# Patient Record
Sex: FEMALE | Race: WHITE | NOT HISPANIC OR LATINO | Employment: OTHER | URBAN - METROPOLITAN AREA
[De-identification: names, ages, dates, MRNs, and addresses within clinical notes are randomized per-mention and may not be internally consistent; named-entity substitution may affect disease eponyms.]

---

## 2020-07-10 ENCOUNTER — HOSPITAL ENCOUNTER (INPATIENT)
Facility: HOSPITAL | Age: 70
LOS: 4 days | Discharge: HOME | End: 2020-07-14
Attending: EMERGENCY MEDICINE | Admitting: INTERNAL MEDICINE

## 2020-07-10 ENCOUNTER — APPOINTMENT (OUTPATIENT)
Dept: CT IMAGING | Facility: HOSPITAL | Age: 70
End: 2020-07-10

## 2020-07-10 DIAGNOSIS — J18.9 PNEUMONIA OF RIGHT UPPER LOBE DUE TO INFECTIOUS ORGANISM: Primary | ICD-10-CM

## 2020-07-10 DIAGNOSIS — I50.9 ACUTE CONGESTIVE HEART FAILURE, UNSPECIFIED HEART FAILURE TYPE (HCC): ICD-10-CM

## 2020-07-10 DIAGNOSIS — I50.21 ACUTE SYSTOLIC HEART FAILURE (HCC): ICD-10-CM

## 2020-07-10 PROBLEM — I51.7 CARDIOMEGALY: Status: ACTIVE | Noted: 2020-07-10

## 2020-07-10 PROBLEM — D64.9 ANEMIA, UNSPECIFIED: Status: ACTIVE | Noted: 2020-07-10

## 2020-07-10 PROBLEM — I44.7 LBBB (LEFT BUNDLE BRANCH BLOCK): Status: ACTIVE | Noted: 2020-07-10

## 2020-07-10 PROBLEM — R79.89 ABNORMAL TSH: Status: ACTIVE | Noted: 2020-07-10

## 2020-07-10 PROBLEM — J96.01 ACUTE RESPIRATORY FAILURE WITH HYPOXIA (HCC): Status: ACTIVE | Noted: 2020-07-10

## 2020-07-10 LAB
ALBUMIN SERPL-MCNC: 4.6 G/DL (ref 3.5–5.2)
ALBUMIN/GLOB SERPL: 1.5 G/DL
ALP SERPL-CCNC: 127 U/L (ref 39–117)
ALT SERPL W P-5'-P-CCNC: 20 U/L (ref 1–33)
ANION GAP SERPL CALCULATED.3IONS-SCNC: 14 MMOL/L (ref 5–15)
AST SERPL-CCNC: 29 U/L (ref 1–32)
BASOPHILS # BLD AUTO: 0.05 10*3/MM3 (ref 0–0.2)
BASOPHILS NFR BLD AUTO: 0.5 % (ref 0–1.5)
BILIRUB SERPL-MCNC: 0.7 MG/DL (ref 0–1.2)
BUN SERPL-MCNC: 17 MG/DL (ref 8–23)
BUN/CREAT SERPL: 14.9 (ref 7–25)
CALCIUM SPEC-SCNC: 9.8 MG/DL (ref 8.6–10.5)
CHLORIDE SERPL-SCNC: 101 MMOL/L (ref 98–107)
CO2 SERPL-SCNC: 25 MMOL/L (ref 22–29)
CREAT SERPL-MCNC: 1.14 MG/DL (ref 0.57–1)
CRP SERPL-MCNC: 1.43 MG/DL (ref 0–0.5)
D DIMER PPP FEU-MCNC: 1.72 MCGFEU/ML (ref 0–0.56)
D-LACTATE SERPL-SCNC: 1.2 MMOL/L (ref 0.5–2)
DEPRECATED RDW RBC AUTO: 45.9 FL (ref 37–54)
EOSINOPHIL # BLD AUTO: 0.05 10*3/MM3 (ref 0–0.4)
EOSINOPHIL NFR BLD AUTO: 0.5 % (ref 0.3–6.2)
ERYTHROCYTE [DISTWIDTH] IN BLOOD BY AUTOMATED COUNT: 14.9 % (ref 12.3–15.4)
GFR SERPL CREATININE-BSD FRML MDRD: 48 ML/MIN/1.73
GLOBULIN UR ELPH-MCNC: 3.1 GM/DL
GLUCOSE SERPL-MCNC: 145 MG/DL (ref 65–99)
HCT VFR BLD AUTO: 34.8 % (ref 34–46.6)
HGB BLD-MCNC: 10.9 G/DL (ref 12–15.9)
HOLD SPECIMEN: NORMAL
HOLD SPECIMEN: NORMAL
IMM GRANULOCYTES # BLD AUTO: 0.04 10*3/MM3 (ref 0–0.05)
IMM GRANULOCYTES NFR BLD AUTO: 0.4 % (ref 0–0.5)
LDH SERPL-CCNC: 251 U/L (ref 135–214)
LYMPHOCYTES # BLD AUTO: 1.54 10*3/MM3 (ref 0.7–3.1)
LYMPHOCYTES NFR BLD AUTO: 15.1 % (ref 19.6–45.3)
MCH RBC QN AUTO: 26.8 PG (ref 26.6–33)
MCHC RBC AUTO-ENTMCNC: 31.3 G/DL (ref 31.5–35.7)
MCV RBC AUTO: 85.5 FL (ref 79–97)
MONOCYTES # BLD AUTO: 0.79 10*3/MM3 (ref 0.1–0.9)
MONOCYTES NFR BLD AUTO: 7.7 % (ref 5–12)
NEUTROPHILS NFR BLD AUTO: 7.73 10*3/MM3 (ref 1.7–7)
NEUTROPHILS NFR BLD AUTO: 75.8 % (ref 42.7–76)
NRBC BLD AUTO-RTO: 0 /100 WBC (ref 0–0.2)
NT-PROBNP SERPL-MCNC: ABNORMAL PG/ML (ref 0–900)
PLATELET # BLD AUTO: 461 10*3/MM3 (ref 140–450)
PMV BLD AUTO: 10.4 FL (ref 6–12)
POTASSIUM SERPL-SCNC: 3.8 MMOL/L (ref 3.5–5.2)
PROCALCITONIN SERPL-MCNC: 0.1 NG/ML (ref 0–0.25)
PROT SERPL-MCNC: 7.7 G/DL (ref 6–8.5)
RBC # BLD AUTO: 4.07 10*6/MM3 (ref 3.77–5.28)
SODIUM SERPL-SCNC: 140 MMOL/L (ref 136–145)
TROPONIN T SERPL-MCNC: <0.01 NG/ML (ref 0–0.03)
TSH SERPL DL<=0.05 MIU/L-ACNC: 7.73 UIU/ML (ref 0.27–4.2)
WBC # BLD AUTO: 10.2 10*3/MM3 (ref 3.4–10.8)
WHOLE BLOOD HOLD SPECIMEN: NORMAL
WHOLE BLOOD HOLD SPECIMEN: NORMAL

## 2020-07-10 PROCEDURE — 80053 COMPREHEN METABOLIC PANEL: CPT | Performed by: EMERGENCY MEDICINE

## 2020-07-10 PROCEDURE — 85379 FIBRIN DEGRADATION QUANT: CPT | Performed by: INTERNAL MEDICINE

## 2020-07-10 PROCEDURE — 71275 CT ANGIOGRAPHY CHEST: CPT

## 2020-07-10 PROCEDURE — 25010000002 HEPARIN (PORCINE) PER 1000 UNITS: Performed by: INTERNAL MEDICINE

## 2020-07-10 PROCEDURE — U0002 COVID-19 LAB TEST NON-CDC: HCPCS | Performed by: INTERNAL MEDICINE

## 2020-07-10 PROCEDURE — 25010000002 FUROSEMIDE PER 20 MG: Performed by: EMERGENCY MEDICINE

## 2020-07-10 PROCEDURE — 99284 EMERGENCY DEPT VISIT MOD MDM: CPT

## 2020-07-10 PROCEDURE — C9803 HOPD COVID-19 SPEC COLLECT: HCPCS | Performed by: INTERNAL MEDICINE

## 2020-07-10 PROCEDURE — 87040 BLOOD CULTURE FOR BACTERIA: CPT | Performed by: EMERGENCY MEDICINE

## 2020-07-10 PROCEDURE — 84484 ASSAY OF TROPONIN QUANT: CPT | Performed by: EMERGENCY MEDICINE

## 2020-07-10 PROCEDURE — 86140 C-REACTIVE PROTEIN: CPT | Performed by: INTERNAL MEDICINE

## 2020-07-10 PROCEDURE — 93005 ELECTROCARDIOGRAM TRACING: CPT | Performed by: EMERGENCY MEDICINE

## 2020-07-10 PROCEDURE — 99223 1ST HOSP IP/OBS HIGH 75: CPT | Performed by: INTERNAL MEDICINE

## 2020-07-10 PROCEDURE — 84145 PROCALCITONIN (PCT): CPT | Performed by: INTERNAL MEDICINE

## 2020-07-10 PROCEDURE — 25010000002 CEFTRIAXONE PER 250 MG: Performed by: EMERGENCY MEDICINE

## 2020-07-10 PROCEDURE — 84443 ASSAY THYROID STIM HORMONE: CPT | Performed by: EMERGENCY MEDICINE

## 2020-07-10 PROCEDURE — 85025 COMPLETE CBC W/AUTO DIFF WBC: CPT | Performed by: EMERGENCY MEDICINE

## 2020-07-10 PROCEDURE — 0 IOPAMIDOL PER 1 ML: Performed by: EMERGENCY MEDICINE

## 2020-07-10 PROCEDURE — 83615 LACTATE (LD) (LDH) ENZYME: CPT | Performed by: INTERNAL MEDICINE

## 2020-07-10 PROCEDURE — 83605 ASSAY OF LACTIC ACID: CPT | Performed by: EMERGENCY MEDICINE

## 2020-07-10 PROCEDURE — 83880 ASSAY OF NATRIURETIC PEPTIDE: CPT | Performed by: EMERGENCY MEDICINE

## 2020-07-10 RX ORDER — MONTELUKAST SODIUM 10 MG/1
10 TABLET ORAL NIGHTLY
COMMUNITY
End: 2021-04-07

## 2020-07-10 RX ORDER — ZOLPIDEM TARTRATE 10 MG/1
10 TABLET ORAL NIGHTLY PRN
COMMUNITY
End: 2020-09-30

## 2020-07-10 RX ORDER — AZITHROMYCIN 250 MG/1
500 TABLET, FILM COATED ORAL ONCE
Status: DISCONTINUED | OUTPATIENT
Start: 2020-07-10 | End: 2020-07-10

## 2020-07-10 RX ORDER — DILTIAZEM HYDROCHLORIDE 120 MG/1
120 CAPSULE, COATED, EXTENDED RELEASE ORAL DAILY
COMMUNITY
End: 2020-07-14 | Stop reason: HOSPADM

## 2020-07-10 RX ORDER — CETIRIZINE HYDROCHLORIDE 10 MG/1
10 TABLET ORAL DAILY PRN
COMMUNITY

## 2020-07-10 RX ORDER — FUROSEMIDE 10 MG/ML
40 INJECTION INTRAMUSCULAR; INTRAVENOUS ONCE
Status: COMPLETED | OUTPATIENT
Start: 2020-07-10 | End: 2020-07-10

## 2020-07-10 RX ORDER — HEPARIN SODIUM 5000 [USP'U]/ML
5000 INJECTION, SOLUTION INTRAVENOUS; SUBCUTANEOUS EVERY 12 HOURS SCHEDULED
Status: DISCONTINUED | OUTPATIENT
Start: 2020-07-10 | End: 2020-07-14 | Stop reason: HOSPADM

## 2020-07-10 RX ORDER — MELOXICAM 15 MG/1
15 TABLET ORAL DAILY
COMMUNITY
End: 2020-07-14 | Stop reason: HOSPADM

## 2020-07-10 RX ORDER — VENLAFAXINE 75 MG/1
75 TABLET ORAL DAILY
COMMUNITY
End: 2020-10-21

## 2020-07-10 RX ORDER — DOXYCYCLINE 100 MG/1
100 CAPSULE ORAL EVERY 12 HOURS SCHEDULED
Status: DISCONTINUED | OUTPATIENT
Start: 2020-07-10 | End: 2020-07-12

## 2020-07-10 RX ORDER — SODIUM CHLORIDE 0.9 % (FLUSH) 0.9 %
10 SYRINGE (ML) INJECTION AS NEEDED
Status: DISCONTINUED | OUTPATIENT
Start: 2020-07-10 | End: 2020-07-14 | Stop reason: HOSPADM

## 2020-07-10 RX ORDER — DILTIAZEM HYDROCHLORIDE 120 MG/1
120 CAPSULE, EXTENDED RELEASE ORAL DAILY
COMMUNITY
End: 2020-07-10

## 2020-07-10 RX ADMIN — SODIUM CHLORIDE, PRESERVATIVE FREE 10 ML: 5 INJECTION INTRAVENOUS at 21:28

## 2020-07-10 RX ADMIN — HEPARIN SODIUM 5000 UNITS: 5000 INJECTION, SOLUTION INTRAVENOUS; SUBCUTANEOUS at 21:28

## 2020-07-10 RX ADMIN — DOXYCYCLINE 100 MG: 100 CAPSULE ORAL at 17:13

## 2020-07-10 RX ADMIN — CEFTRIAXONE SODIUM 1 G: 1 INJECTION, POWDER, FOR SOLUTION INTRAMUSCULAR; INTRAVENOUS at 17:13

## 2020-07-10 RX ADMIN — FUROSEMIDE 40 MG: 10 INJECTION, SOLUTION INTRAMUSCULAR; INTRAVENOUS at 17:13

## 2020-07-10 RX ADMIN — IOPAMIDOL 60 ML: 755 INJECTION, SOLUTION INTRAVENOUS at 15:28

## 2020-07-10 NOTE — ED PROVIDER NOTES
Subjective   Mrs. Bobby presents with a complaint of shortness of breath.  She tells me she is noticed that over the last several months but the last 2 nights in particular is been particularly bad.  She wakes up short of breath at night.  She denies swelling anywhere.  She denies fevers or chills.  She does have a chronic cough which she attributes to allergies.  She tells me her cough seems a little different over the last few days however.  She does note a pleuritic pain in her upper back bilaterally beginning this morning.  She tells me that gets worse when she breathes hard.  She saw her primary care provider in her hometown and had a chest x-ray done which was read as congestive heart failure.  She has history of hypertension and has had SVT in the past which was treated with ablation.  She takes Cardizem daily.      History provided by:  Patient  Shortness of Breath   Severity:  Severe  Onset quality:  Gradual  Timing:  Constant  Progression:  Worsening  Chronicity:  New  Relieved by:  Nothing  Exacerbated by: Supine position.  Ineffective treatments:  None tried  Associated symptoms: chest pain and cough    Associated symptoms: no abdominal pain, no fever, no sputum production and no vomiting        Review of Systems   Constitutional: Negative for chills and fever.   HENT: Positive for congestion and postnasal drip.    Respiratory: Positive for cough and shortness of breath. Negative for sputum production.    Cardiovascular: Positive for chest pain.   Gastrointestinal: Negative for abdominal pain, nausea and vomiting.   Musculoskeletal: Positive for back pain.   Neurological: Positive for weakness.   Psychiatric/Behavioral: The patient is nervous/anxious.        Past Medical History:   Diagnosis Date   • Arthritis    • CHF (congestive heart failure) (CMS/[facility])    • Insomnia    • Seasonal allergies    • Tachyarrhythmia        Allergies   Allergen Reactions   • Codeine Nausea And Vomiting       Past Surgical  History:   Procedure Laterality Date   • BREAST BIOPSY Left    • CARDIAC ABLATION     • HYSTERECTOMY  2013   • OOPHORECTOMY  2013       Family History   Problem Relation Age of Onset   • Ovarian cancer Neg Hx    • Breast cancer Neg Hx        Social History     Socioeconomic History   • Marital status:      Spouse name: Not on file   • Number of children: Not on file   • Years of education: Not on file   • Highest education level: Not on file   Tobacco Use   • Smoking status: Never Smoker   • Smokeless tobacco: Never Used   Substance and Sexual Activity   • Alcohol use: Never     Frequency: Never   • Drug use: Never   • Sexual activity: Defer           Objective   Physical Exam   Constitutional: She is oriented to person, place, and time. She appears well-developed and well-nourished.   She appears anxious   HENT:   Head: Normocephalic and atraumatic.   Neck: Normal range of motion. Neck supple. No JVD present.   Cardiovascular: Regular rhythm. Exam reveals no friction rub.   No murmur heard.  She is tachycardic   Pulmonary/Chest: She has no decreased breath sounds. She has no wheezes. She has no rales.   She is tachypneic.  Breath sounds are equal and lungs are clear.   Abdominal: Soft. Bowel sounds are normal. There is no tenderness. There is no guarding.   Musculoskeletal:        Right lower leg: Normal. She exhibits no tenderness and no edema.        Left lower leg: Normal. She exhibits no tenderness and no edema.   Neurological: She is alert and oriented to person, place, and time.   Skin: Skin is warm and dry. Capillary refill takes less than 2 seconds. She is not diaphoretic. No cyanosis. No pallor.   Psychiatric: Her behavior is normal. Her mood appears anxious.   Nursing note and vitals reviewed.      Procedures           ED Course  ED Course as of Jul 11 0612   Fri Jul 10, 2020   1341 EKG shows tachycardia with a left bundle branch block pattern which is new from 2014.  Wells score is 4.5.  Suspicion  is high for pulmonary embolism, will obtain CT angiogram of her chest.  Oxygen saturations are 100% on room air during my exam    [DT]   1614 CTA shows no pulmonary embolism but does show pulmonary edema versus pneumonia versus COVID infection.  Will give IV antibiotics, IV Lasix, and admit to the hospital for further evaluation.    [DT]   1621 I discussed with Dr. David who will admit.  I will order echocardiogram.  I will also discussed with Dr. Peralta who is on-call for cardiology    [DT]   1634 I spoke with Mrs. Bobby about findings and plan for admission.    [DT]   1643 I spoke with Dr. Peralta about her case.    [DT]      ED Course User Index  [DT] Orlando Quiroz MD                                           MDM  Number of Diagnoses or Management Options  Acute congestive heart failure, unspecified heart failure type (CMS/HCC): new and requires workup  Pneumonia of right upper lobe due to infectious organism: new and requires workup     Amount and/or Complexity of Data Reviewed  Clinical lab tests: reviewed and ordered  Tests in the radiology section of CPT®: ordered and reviewed  Discuss the patient with other providers: yes  Independent visualization of images, tracings, or specimens: yes    Patient Progress  Patient progress: improved      Final diagnoses:   Pneumonia of right upper lobe due to infectious organism   Acute congestive heart failure, unspecified heart failure type (CMS/HCC)            Orlando Quiroz MD  07/11/20 0612

## 2020-07-10 NOTE — H&P
[facility] Medicine Services  HISTORY AND PHYSICAL    Patient Name: Valeria Bobby  : 1950  MRN: [id number]  Primary Care Physician: Lor Barreto MD    Subjective   Subjective     Chief Complaint: orthopnea      HPI:  Valeria Bobby is a 69 y.o. female with HO of OA and tachycardia with an ablation by Dr. Biswas many years ago.  She has been stable with her cardiac issues and not been seen for several years.  Who was sent to the ED today by her PCP for a CXR that showed that she had heart failure.  Patient reports that she has not felt well since her   of cancer 5 months ago.  Immediately she was anorexic and depressed.  Since then she has had significant fatigue and shortness of breath.  The symptoms have worsened over the last little bit.  She denies any chest pain or tightness.  She does report a fullness in her subxiphoid region.  She has had worsening orthopnea over the last several nights.  She has no history of heart failure or coronary artery disease.  Her son-in-law did have COVID-19, but her daughter never contracted it.  Patient has not been exposed.  She was tested and negative several weeks ago.  She has no current symptoms consistent with COVID-19    Review of Systems   Patient denies weakness numbness tingling, change in appetite.  Otherwise complete 10-system ROS is negative except as mentioned in the HPI.    Personal History     Past Medical History:   Diagnosis Date   • Arthritis    • CHF (congestive heart failure) (CMS/HCC)    • Insomnia    • Seasonal allergies    • Tachyarrhythmia        Past Surgical History:   Procedure Laterality Date   • BREAST BIOPSY Left    • CARDIAC ABLATION     • HYSTERECTOMY     • OOPHORECTOMY         Family History: She has no heart disease in her family.  Her mother  of complications from rheumatoid arthritis and lung disease.      Social History:  reports that she has never smoked. She has never  used smokeless tobacco. She reports that she does not drink alcohol or use drugs. She is recently , only lives alone    Medications:  Prior to Admission medications    Medication Sig Start Date End Date Taking? Authorizing Provider   cetirizine (zyrTEC) 10 MG tablet Take 10 mg by mouth Daily.   Yes Morgan Nielsen MD   dilTIAZem SR (CARDIZEM SR) 120 MG 12 hr capsule Take 120 mg by mouth Daily.   Yes Morgan Nielsen MD   meloxicam (MOBIC) 15 MG tablet Take 15 mg by mouth Daily.   Yes Morgan Nielsen MD   montelukast (SINGULAIR) 10 MG tablet Take 10 mg by mouth Every Night.   Yes Morgan Nielsen MD   zolpidem (AMBIEN) 10 MG tablet Take 10 mg by mouth At Night As Needed for Sleep.   Yes ProviderMorgan MD       Allergies   Allergen Reactions   • Codeine Nausea And Vomiting       Objective   Objective     Vital Signs:   Temp:  [97.6 °F (36.4 °C)] 97.6 °F (36.4 °C)  Heart Rate:  [] 88  Resp:  [18] 18  BP: (119-142)/(49-82) 123/71   Physical Exam   Patient is alert and talkative in no distress at rest, her feet are in constant movement.  She is slightly restless/anxious  Neck is without mass or JVD  Heart is Reg wo murmur  Lungs are decreased in the bases, tachypneic she is short of breath when talking  Abd is soft without HSM or mass, not tender or distended  MAEW, no edema  Skin is without rash  Neurologic exam is nonfocal   Mood is appropriate    Results Reviewed:  I have personally reviewed current lab, radiology, and data and agree.    Results from last 7 days   Lab Units 07/10/20  1353   WBC 10*3/mm3 10.20   HEMOGLOBIN g/dL 10.9*   HEMATOCRIT % 34.8   PLATELETS 10*3/mm3 461*     Results from last 7 days   Lab Units 07/10/20  1353   SODIUM mmol/L 140   POTASSIUM mmol/L 3.8   CHLORIDE mmol/L 101   CO2 mmol/L 25.0   BUN mg/dL 17   CREATININE mg/dL 1.14*   GLUCOSE mg/dL 145*   CALCIUM mg/dL 9.8   ALT (SGPT) U/L 20   AST (SGOT) U/L 29       Imaging Results (Last 24 Hours)      Procedure Component Value Units Date/Time    CT Angiogram Chest [[id number]] Collected:  07/10/20 1600     Updated:  07/10/20 1604    Narrative:       EXAMINATION: CT ANGIOGRAM CHEST-      INDICATION: Pleuritic chest pain tachycardia and shortness of breath      TECHNIQUE: CT angiogram chest with intravenous contrast following PE  protocol. 2-D reconstructions performed.     The radiation dose reduction device was turned on for each scan per the  ALARA (As Low as Reasonably Achievable) protocol.     COMPARISON: NONE     FINDINGS:      Thyroid is homogeneous in attenuation. Enlarged mediastinal and  subcarinal lymph nodes as well as right hilar adenopathy with a right  paratracheal 1.7 cm node in a subcarinal 3 similar node with central  airways remain grossly patent. Esophagus in normal course and caliber.  Atherosclerotic nonaneurysmal thoracic aorta. Satisfactory opacification  of the pulmonary arterial tree on contrast bolus timing without filling  defect to suggest pulmonary embolus. Cardiac size enlarged with  four-chamber cardiomegaly however no pericardial effusion. Scattered  opacifications within the right upper lobe posteriorly and bilateral  lower lungs somewhat indeterminate but likely represent atypical  airspace disease without dominant nodule or mass. Small bilateral  pleural effusions. Degenerative changes of the thoracic spine without  aggressive osseous lesion. No soft tissue body wall findings of concern  specifically no bulky axillary adenopathy. Visualized portions of the  upper abdomen have noted reflux of contrast into the hepatic veins and  intrahepatic IVC.             Impression:       1. No PE.  2. Four-chamber cardiomegaly with smooth intralobular septal thickening  of interstitial edema pattern with small bilateral pleural effusions.  3. Scattered opacifications greatest in the right upper lobe posteriorly  and right lower lungs somewhat ill-defined may represent areas of mild  asymmetric  edema pattern versus airspace disease with atypical etiology  not excluded. Indeterminate association for covid-19.                    COVID-19 RISK SCREEN     1. Has the patient had close contact without PPE with a lab confirmed COVID-19 (+) person or a person under investigation (PUI) for COVID-19 infection?  -- Yes not recent and subsequent test negative    2. Has the patient had respiratory symptoms, worsened/new cough and/or SOA, unexplained fever, or sudden loss of smell and/or taste in the past 7 days? --  No    3. Does the patient have baseline higher exposure risk such as working in healthcare field, currently residing in healthcare facility, or ongoing hemodialysis?  --  No         Assessment/Plan   Assessment / Plan     Active Hospital Problems    Diagnosis   • Pneumonia of right upper lobe due to infectious organism   • Acute heart failure (CMS/HCC)   • LBBB (left bundle branch block)   • Abnormal TSH   • Anemia, unspecified   • Acute respiratory failure with hypoxia (CMS/HCC)     70 yo with history of tachycardia status post AV ablation now presents with acute heart failure.  Which could be possible Takotsubo-or cardiomyopathy related to tachyarrhythmia.      New Heart failure of unknown etiology  She reports she is not had any previous trouble with tachycardia, is compliant with taking her beta-blocker.  -Continue IV diuretics-use oxygen for comfort  -Echocardiogram as soon as possible  -I do not think she is at risk for having COVID 19 therefore does not need to be ruled out.  Was recently tested negative and has not had any further exposure.  --I reviewed her CT scan and do not think that this is infectious pneumonia or consistent with COVID-19.  Also discussed with Dr. Peralta who will see her tomorrow in consultation.  -EKG shows left bundle branch block without acute ischemia      Anxiety, depression, grief  - all seems appropriate    Abnormal TSH  - in light of her heart failure will not  automatically treat an elevated TSH, but consider outpatient follow-up when she is more stable.      DVT prophylaxis: Lovenox    CODE STATUS: Full full code    Admission Status: INPATIENT status due to hypoxia, new heart failure and respiratory distress.  I feel patient’s risk for adverse outcomes and need for care warrant INPATIENT evaluation and I predict the patient’s care encounter to likely last beyond 2 midnights.    Electronically signed by Daisy David MD 07/10/20 16:29

## 2020-07-11 ENCOUNTER — APPOINTMENT (OUTPATIENT)
Dept: CARDIOLOGY | Facility: HOSPITAL | Age: 70
End: 2020-07-11

## 2020-07-11 LAB
ANION GAP SERPL CALCULATED.3IONS-SCNC: 15 MMOL/L (ref 5–15)
BUN SERPL-MCNC: 19 MG/DL (ref 8–23)
BUN/CREAT SERPL: 16 (ref 7–25)
CALCIUM SPEC-SCNC: 9.9 MG/DL (ref 8.6–10.5)
CHLORIDE SERPL-SCNC: 100 MMOL/L (ref 98–107)
CO2 SERPL-SCNC: 23 MMOL/L (ref 22–29)
CREAT SERPL-MCNC: 1.19 MG/DL (ref 0.57–1)
FERRITIN SERPL-MCNC: 53.81 NG/ML (ref 13–150)
FOLATE SERPL-MCNC: 15.3 NG/ML (ref 4.78–24.2)
GFR SERPL CREATININE-BSD FRML MDRD: 45 ML/MIN/1.73
GLUCOSE SERPL-MCNC: 108 MG/DL (ref 65–99)
IRON 24H UR-MRATE: 38 MCG/DL (ref 37–145)
LDH SERPL-CCNC: 217 U/L (ref 135–214)
POTASSIUM SERPL-SCNC: 3.8 MMOL/L (ref 3.5–5.2)
REF LAB TEST METHOD: NORMAL
RETICS # AUTO: 0.08 10*6/MM3 (ref 0.02–0.13)
RETICS/RBC NFR AUTO: 1.95 % (ref 0.7–1.9)
SARS-COV-2 RNA RESP QL NAA+PROBE: NOT DETECTED
SODIUM SERPL-SCNC: 138 MMOL/L (ref 136–145)
T4 FREE SERPL-MCNC: 1.11 NG/DL (ref 0.93–1.7)
VIT B12 BLD-MCNC: 286 PG/ML (ref 211–946)

## 2020-07-11 PROCEDURE — 25010000002 HEPARIN (PORCINE) PER 1000 UNITS: Performed by: INTERNAL MEDICINE

## 2020-07-11 PROCEDURE — 82746 ASSAY OF FOLIC ACID SERUM: CPT | Performed by: INTERNAL MEDICINE

## 2020-07-11 PROCEDURE — 93308 TTE F-UP OR LMTD: CPT | Performed by: INTERNAL MEDICINE

## 2020-07-11 PROCEDURE — 93308 TTE F-UP OR LMTD: CPT

## 2020-07-11 PROCEDURE — 93321 DOPPLER ECHO F-UP/LMTD STD: CPT

## 2020-07-11 PROCEDURE — 93325 DOPPLER ECHO COLOR FLOW MAPG: CPT | Performed by: INTERNAL MEDICINE

## 2020-07-11 PROCEDURE — 93321 DOPPLER ECHO F-UP/LMTD STD: CPT | Performed by: INTERNAL MEDICINE

## 2020-07-11 PROCEDURE — 82607 VITAMIN B-12: CPT | Performed by: INTERNAL MEDICINE

## 2020-07-11 PROCEDURE — 99222 1ST HOSP IP/OBS MODERATE 55: CPT | Performed by: INTERNAL MEDICINE

## 2020-07-11 PROCEDURE — 99232 SBSQ HOSP IP/OBS MODERATE 35: CPT | Performed by: INTERNAL MEDICINE

## 2020-07-11 PROCEDURE — 83540 ASSAY OF IRON: CPT | Performed by: INTERNAL MEDICINE

## 2020-07-11 PROCEDURE — 83615 LACTATE (LD) (LDH) ENZYME: CPT | Performed by: INTERNAL MEDICINE

## 2020-07-11 PROCEDURE — 93325 DOPPLER ECHO COLOR FLOW MAPG: CPT

## 2020-07-11 PROCEDURE — 85045 AUTOMATED RETICULOCYTE COUNT: CPT | Performed by: INTERNAL MEDICINE

## 2020-07-11 PROCEDURE — 82728 ASSAY OF FERRITIN: CPT | Performed by: INTERNAL MEDICINE

## 2020-07-11 PROCEDURE — 84439 ASSAY OF FREE THYROXINE: CPT | Performed by: INTERNAL MEDICINE

## 2020-07-11 PROCEDURE — 80048 BASIC METABOLIC PNL TOTAL CA: CPT | Performed by: INTERNAL MEDICINE

## 2020-07-11 RX ORDER — CETIRIZINE HYDROCHLORIDE 10 MG/1
5 TABLET ORAL DAILY
Status: DISCONTINUED | OUTPATIENT
Start: 2020-07-11 | End: 2020-07-14 | Stop reason: HOSPADM

## 2020-07-11 RX ORDER — FUROSEMIDE 40 MG/1
40 TABLET ORAL DAILY
Status: DISCONTINUED | OUTPATIENT
Start: 2020-07-11 | End: 2020-07-12

## 2020-07-11 RX ORDER — VENLAFAXINE 37.5 MG/1
75 TABLET ORAL DAILY
Status: DISCONTINUED | OUTPATIENT
Start: 2020-07-11 | End: 2020-07-14 | Stop reason: HOSPADM

## 2020-07-11 RX ORDER — DILTIAZEM HYDROCHLORIDE 120 MG/1
120 CAPSULE, COATED, EXTENDED RELEASE ORAL DAILY
Status: DISCONTINUED | OUTPATIENT
Start: 2020-07-11 | End: 2020-07-11

## 2020-07-11 RX ORDER — MONTELUKAST SODIUM 10 MG/1
10 TABLET ORAL NIGHTLY
Status: DISCONTINUED | OUTPATIENT
Start: 2020-07-11 | End: 2020-07-14 | Stop reason: HOSPADM

## 2020-07-11 RX ORDER — CARVEDILOL 6.25 MG/1
6.25 TABLET ORAL 2 TIMES DAILY WITH MEALS
Status: DISCONTINUED | OUTPATIENT
Start: 2020-07-11 | End: 2020-07-14 | Stop reason: HOSPADM

## 2020-07-11 RX ADMIN — DOXYCYCLINE 100 MG: 100 CAPSULE ORAL at 21:38

## 2020-07-11 RX ADMIN — VENLAFAXINE 75 MG: 37.5 TABLET ORAL at 08:37

## 2020-07-11 RX ADMIN — CARVEDILOL 6.25 MG: 6.25 TABLET, FILM COATED ORAL at 21:38

## 2020-07-11 RX ADMIN — MONTELUKAST SODIUM 10 MG: 10 TABLET, COATED ORAL at 21:38

## 2020-07-11 RX ADMIN — SODIUM CHLORIDE, PRESERVATIVE FREE 10 ML: 5 INJECTION INTRAVENOUS at 21:38

## 2020-07-11 RX ADMIN — DILTIAZEM HYDROCHLORIDE 120 MG: 120 CAPSULE, COATED, EXTENDED RELEASE ORAL at 08:37

## 2020-07-11 RX ADMIN — CETIRIZINE HYDROCHLORIDE 5 MG: 10 TABLET, FILM COATED ORAL at 08:37

## 2020-07-11 RX ADMIN — HEPARIN SODIUM 5000 UNITS: 5000 INJECTION, SOLUTION INTRAVENOUS; SUBCUTANEOUS at 08:38

## 2020-07-11 RX ADMIN — FUROSEMIDE 40 MG: 40 TABLET ORAL at 12:59

## 2020-07-11 RX ADMIN — DOXYCYCLINE 100 MG: 100 CAPSULE ORAL at 08:37

## 2020-07-11 RX ADMIN — HEPARIN SODIUM 5000 UNITS: 5000 INJECTION, SOLUTION INTRAVENOUS; SUBCUTANEOUS at 21:38

## 2020-07-11 NOTE — CONSULTS
[facility] at [facility]  Consultation H&P    Patient: Valeria Bobby  1950    There is no work phone number on file.      PCP:  Lor Barreto MD   Treatment Team:   Attending Provider: Danay Scott DO  Consulting Physician: Bobby Peralta III, MD  Admitting Provider: Danay Sctot DO   7/10/2020      DATE OF CONSULTATION: 7/11/2020 10:37     REASON FOR CONSULTATION: Dyspnea on exertion      ASSESSMENT/:  Acute congestive heart failure, suspect systolic, EF assessment pending  Left bundle branch block, new  Anemia, normocytic    PLAN:  Gradual diuresis, switching to daily p.o. Lasix while trending renal function and electrolytes  Discontinuing diltiazem, starting carvedilol and titrating as blood pressure tolerates  Echo pending -further recommendations to follow depending on assessment of underlying myocardial structure and function    History of Present Illness   69-year-old female presents with 4 months of gradually progressive dyspnea on exertion.  Her  passed away in February of this year.  She had spent much of the previous year going to and from his doctors visits.  She began to notice shortness of breath with exertion and fatigue starting in March of this year.  It gradually progressed to the point where she was getting noticeably shortness of breath walking more than 50 feet on level ground.  Denied associated chest discomfort or palpitations.  Symptoms progressed significantly over the previous 4 to 5 days, she was having progressive orthopnea for the past 2 nights.  Also reporting abdominal bloating and distention.  Evaluation in the emergency room revealed new onset left bundle branch block compared to her most recent EKG obtained in March 2014.  Her most recent cardiac evaluation was in March 2014 in which an echo revealed preserved LV systolic function and no significant valvular heart disease and a normal exercise myocardial perfusion imaging stress test.   Laboratory evaluation was remarkable for a proBNP of approximately 12,000, negative troponin, creatinine of 1.1 which is up from her previous baseline of 0.6 in 2014, mildly elevated TSH, hemoglobin of 10.9, normocytic.  Slightly elevated d-dimer with follow-up CTA which is negative for PE but did reveal small bilateral pleural effusions and diffuse groundglass opacities consistent with underlying congestive heart failure.  She reports a significant improvement in her breathing after diuresis overnight.  Still appears to have some conversational dyspnea.    -Remote history of PSVT status post ablation with Dr. Biswas    OBJECTIVE:  Vitals:    07/10/20 2330 07/10/20 2346 07/11/20 0743 07/11/20 0837   BP:   117/62 117/62   BP Location:   Right arm    Patient Position:   Lying    Pulse: 92 87 85 83   Resp:   20    Temp:   98.1 °F (36.7 °C)    TempSrc:   Oral    SpO2:   94%    Weight:       Height:         No intake/output data recorded.  No intake/output data recorded.  Intake & Output (last 3 days)       07/08 0701 - 07/09 0700 07/09 0701 - 07/10 0700 07/10 0701 - 07/11 0700 07/11 0701 - 07/12 0700            Urine Unmeasured Occurrence   7 x            PHYSICAL EXAMINATION:    General Appearance:    Alert, cooperative, no distress, appears stated age   Head:    Normocephalic, without obvious abnormality, atraumatic   Eyes:    PERRL, conjunctivae/corneas clear, EOMs intact, fundi     benign, both eyes   Ears:    Normal TMs and external ear canals, both ears   Nose:   Nares normal, septum midline, mucosa normal, no drainage    or sinus tenderness   Throat:   Lips, mucosa, and tongue normal; teeth and gums normal   Neck:   Supple, symmetrical, trachea midline, no adenopathy;     thyroid:  no enlargement/tenderness/nodules; no carotid    bruit or JVD   Back:     Symmetric, no curvature, ROM normal, no CVA tenderness   Lungs:     Clear to auscultation bilaterally, respirations unlabored   Chest Wall:    No tenderness  or deformity    Heart:    Regular rate and rhythm, S1 and S2 normal, no murmur, rub   or gallop, normal carotid impulse bilaterally without bruit.   Abdomen:     Soft, non-tender, bowel sounds active all four quadrants,     no masses, no organomegaly   Extremities:   Extremities normal, atraumatic, no cyanosis or edema   Pulses:   2+ and symmetric all extremities   Skin:   Skin color, texture, turgor normal, no rashes or lesions   Lymph nodes:   Cervical, supraclavicular, and axillary nodes normal   Neurologic:   CNII-XII intact, normal strength, sensation and reflexes     throughout     PROBLEM LIST:    Pneumonia of right upper lobe due to infectious organism    Acute heart failure (CMS/HCC)    LBBB (left bundle branch block)    Abnormal TSH    Anemia, unspecified    Acute respiratory failure with hypoxia (CMS/HCC)      Past Medical History:   Diagnosis Date   • Arthritis    • CHF (congestive heart failure) (CMS/HCC)    • Insomnia    • Seasonal allergies    • Tachyarrhythmia      Past Surgical History:   Procedure Laterality Date   • BREAST BIOPSY Left    • CARDIAC ABLATION     • HYSTERECTOMY  2013   • OOPHORECTOMY  2013       Allergies  Allergies   Allergen Reactions   • Codeine Nausea And Vomiting       Current Medications    Current Facility-Administered Medications:   •  carvedilol (COREG) tablet 6.25 mg, 6.25 mg, Oral, BID With Meals, Bobby Peralta III, MD  •  cetirizine (zyrTEC) tablet 5 mg, 5 mg, Oral, Daily, Freida Breen PA-C, 5 mg at 07/11/20 0837  •  doxycycline (MONODOX) capsule 100 mg, 100 mg, Oral, Q12H, Daisy David MD, 100 mg at 07/11/20 0837  •  furosemide (LASIX) tablet 40 mg, 40 mg, Oral, Daily, Bobby Peralta III, MD  •  heparin (porcine) 5000 UNIT/ML injection 5,000 Units, 5,000 Units, Subcutaneous, Q12H, Daisy David MD, 5,000 Units at 07/11/20 0838  •  montelukast (SINGULAIR) tablet 10 mg, 10 mg, Oral, Nightly, Freida Breen PA-C  •  sodium chloride 0.9 % flush 10 mL, 10  mL, Intravenous, PRN, Orlando Quiroz MD, 10 mL at 07/10/20 2128  •  venlafaxine (EFFEXOR) tablet 75 mg, 75 mg, Oral, Daily, Freida Breen PA-C, 75 mg at 07/11/20 0837           ROS  All systems were reviewed and negative except for:  Constitution:  positive for fatigue  Respiratory: positive for  shortness of air  Cardiovascular: positive for  orthopnea  Gastrointestinal: positive for  bloating / distention      SOCIAL HX  Social History     Socioeconomic History   • Marital status:      Spouse name: Not on file   • Number of children: Not on file   • Years of education: Not on file   • Highest education level: Not on file   Tobacco Use   • Smoking status: Never Smoker   • Smokeless tobacco: Never Used   Substance and Sexual Activity   • Alcohol use: Never     Frequency: Never   • Drug use: Never   • Sexual activity: Defer       FAMILY HX  Family History   Problem Relation Age of Onset   • Ovarian cancer Neg Hx    • Breast cancer Neg Hx          Diagnostic Data:  Lab Results (last 24 hours)     Procedure Component Value Units Date/Time    T4, Free [[id number]] Collected:  07/11/20 0443    Specimen:  Blood Updated:  07/11/20 1037    Iron [[id number]]  (Normal) Collected:  07/11/20 0443    Specimen:  Blood Updated:  07/11/20 0707     Iron 38 mcg/dL     Lactate Dehydrogenase [[id number]]  (Abnormal) Collected:  07/11/20 0443    Specimen:  Blood Updated:  07/11/20 0707      U/L     Ferritin [[id number]]  (Normal) Collected:  07/11/20 0443    Specimen:  Blood Updated:  07/11/20 0645     Ferritin 53.81 ng/mL     Narrative:       Results may be falsely decreased if patient taking Biotin.      Vitamin B12 [[id number]] Collected:  07/11/20 0443    Specimen:  Blood Updated:  07/11/20 0602    Folate [[id number]] Collected:  07/11/20 0443    Specimen:  Blood Updated:  07/11/20 0602    Reticulocytes [[id number]]  (Abnormal) Collected:  07/11/20 0443    Specimen:  Blood Updated:  07/11/20 0544     Reticulocyte %  1.95 %      Reticulocyte Absolute 0.0782 10*6/mm3     COVID PRE-OP / PRE-PROCEDURE SCREENING ORDER (NO ISOLATION) - Swab, Nasopharynx [[id number]] Collected:  07/10/20 1819    Specimen:  Swab from Nasopharynx Updated:  07/10/20 1901    Narrative:       The following orders were created for panel order COVID PRE-OP / PRE-PROCEDURE SCREENING ORDER (NO ISOLATION) - Swab, Nasopharynx.  Procedure                               Abnormality         Status                     ---------                               -----------         ------                     COVID-19,[facility], NP ...[[id number]]                      In process                   Please view results for these tests on the individual orders.    COVID-19,[facility], NP SWAB IN LEXAR SALINE MEDIA 24-30 HR TAT - Swab, Nasopharynx [[id number]] Collected:  07/10/20 1819    Specimen:  Swab from Nasopharynx Updated:  07/10/20 1901    D-dimer, Quantitative [[id number]]  (Abnormal) Collected:  07/10/20 1353    Specimen:  Blood Updated:  07/10/20 1717     D-Dimer, Quantitative 1.72 MCGFEU/mL     Narrative:       The D-Dimer assay test is to be used in conjunction with a clinical pretest probability (PTP) assessment model, and has been approved by the FDA to exclude pulmonary embolism (PE) and deep venous thrombosis (DVT) in outpatients suspected of PE or DVT, with a cutoff of 0.5 MCGFEU/mL.    Lactic Acid, Plasma [[id number]]  (Normal) Collected:  07/10/20 1635    Specimen:  Blood Updated:  07/10/20 1704     Lactate 1.2 mmol/L      Comment: Falsely depressed results may occur on samples drawn from patients receiving N-Acetylcysteine (NAC) or Metamizole.       Procalcitonin [[id number]]  (Normal) Collected:  07/10/20 1353    Specimen:  Blood Updated:  07/10/20 1656     Procalcitonin 0.10 ng/mL     Narrative:       As a Marker for Sepsis (Non-Neonates):   1. <0.5 ng/mL represents a low risk of severe sepsis and/or septic shock.  1. >2 ng/mL represents a high risk of severe  "sepsis and/or septic shock.    As a Marker for Lower Respiratory Tract Infections that require antibiotic therapy:  PCT on Admission     Antibiotic Therapy             6-12 Hrs later  > 0.5                Strongly Recommended            >0.25 - <0.5         Recommended  0.1 - 0.25           Discouraged                   Remeasure/reassess PCT  <0.1                 Strongly Discouraged          Remeasure/reassess PCT      As 28 day mortality risk marker: \"Change in Procalcitonin Result\" (> 80 % or <=80 %) if Day 0 (or Day 1) and Day 4 values are available. Refer to http://[email or web address]-pct-[email or web address]/   Change in PCT <=80 %   A decrease of PCT levels below or equal to 80 % defines a positive change in PCT test result representing a higher risk for 28-day all-cause mortality of patients diagnosed with severe sepsis or septic shock.  Change in PCT > 80 %   A decrease of PCT levels of more than 80 % defines a negative change in PCT result representing a lower risk for 28-day all-cause mortality of patients diagnosed with severe sepsis or septic shock.                Results may be falsely decreased if patient taking Biotin.     Lactate Dehydrogenase [[id number]]  (Abnormal) Collected:  07/10/20 1353    Specimen:  Blood Updated:  07/10/20 1647      U/L     C-reactive Protein [[id number]]  (Abnormal) Collected:  07/10/20 1353    Specimen:  Blood Updated:  07/10/20 1646     C-Reactive Protein 1.43 mg/dL     Blood Culture - Blood, Arm, Left [[id number]] Collected:  07/10/20 1636    Specimen:  Blood from Arm, Left Updated:  07/10/20 1643    Blood Culture - Blood, Arm, Right [[id number]] Collected:  07/10/20 1636    Specimen:  Blood from Arm, Right Updated:  07/10/20 1643    BNP [[id number]]  (Abnormal) Collected:  07/10/20 1353    Specimen:  Blood Updated:  07/10/20 1500     proBNP 11,999.0 pg/mL     Narrative:       Among patients with dyspnea, NT-proBNP is highly sensitive for the detection of acute congestive heart " failure. In addition NT-proBNP of <300 pg/ml effectively rules out acute congestive heart failure with 99% negative predictive value.    Results may be falsely decreased if patient taking Biotin.      Troponin [[id number]]  (Normal) Collected:  07/10/20 1353    Specimen:  Blood Updated:  07/10/20 1500     Troponin T <0.010 ng/mL     Narrative:       Troponin T Reference Range:  <= 0.03 ng/mL-   Negative for AMI  >0.03 ng/mL-     Abnormal for myocardial necrosis.  Clinicians would have to utilize clinical acumen, EKG, Troponin and serial changes to determine if it is an Acute Myocardial Infarction or myocardial injury due to an underlying chronic condition.       Results may be falsely decreased if patient taking Biotin.      TSH [[id number]]  (Abnormal) Collected:  07/10/20 1353    Specimen:  Blood Updated:  07/10/20 1500     TSH 7.730 uIU/mL     Narrative:       Due to abnormal TSH results, suggest ordering Free T4.    [place] Draw [[id number]] Collected:  07/10/20 1353    Specimen:  Blood Updated:  07/10/20 1500    Narrative:       The following orders were created for panel order [place] Draw.  Procedure                               Abnormality         Status                     ---------                               -----------         ------                     Light Blue Top[[id number]]                                    Final result               Green Top (Gel)[[id number]]                                   Final result               Lavender Top[[id number]]                                      Final result               Gold Top - SST[[id number]]                                    Final result                 Please view results for these tests on the individual orders.    Light Blue Top [[id number]] Collected:  07/10/20 1353    Specimen:  Blood Updated:  07/10/20 1500     Extra Tube hold for add-on     Comment: Auto resulted       Green Top (Gel) [[id number]] Collected:  07/10/20 1353    Specimen:  Blood Updated:  07/10/20  1500     Extra Tube Hold for add-ons.     Comment: Auto resulted.       [facility] Top - SST [[id number]] Collected:  07/10/20 1353    Specimen:  Blood Updated:  07/10/20 1500     Extra Tube Hold for add-ons.     Comment: Auto resulted.       Comprehensive Metabolic Panel [[id number]]  (Abnormal) Collected:  07/10/20 1353    Specimen:  Blood Updated:  07/10/20 1456     Glucose 145 mg/dL      BUN 17 mg/dL      Creatinine 1.14 mg/dL      Sodium 140 mmol/L      Potassium 3.8 mmol/L      Comment: Specimen hemolyzed.  Results may be affected.        Chloride 101 mmol/L      CO2 25.0 mmol/L      Calcium 9.8 mg/dL      Total Protein 7.7 g/dL      Albumin 4.60 g/dL      ALT (SGPT) 20 U/L      AST (SGOT) 29 U/L      Alkaline Phosphatase 127 U/L      Total Bilirubin 0.7 mg/dL      eGFR Non African Amer 48 mL/min/1.73      Globulin 3.1 gm/dL      A/G Ratio 1.5 g/dL      BUN/Creatinine Ratio 14.9     Anion Gap 14.0 mmol/L     Narrative:       GFR Normal >60  Chronic Kidney Disease <60  Kidney Failure <15      [facility][id number]] Collected:  07/10/20 1353    Specimen:  Blood Updated:  07/10/20 1403     Extra Tube --    CBC & Differential [[id number]] Collected:  07/10/20 1353    Specimen:  Blood Updated:  07/10/20 1402    Narrative:       The following orders were created for panel order CBC & Differential.  Procedure                               Abnormality         Status                     ---------                               -----------         ------                     CBC Auto Differential[[id number]]         Abnormal            Final result                 Please view results for these tests on the individual orders.    CBC Auto Differential [[id number]]  (Abnormal) Collected:  07/10/20 1353    Specimen:  Blood Updated:  07/10/20 1402     WBC 10.20 10*3/mm3      RBC 4.07 10*6/mm3      Hemoglobin 10.9 g/dL      Hematocrit 34.8 %      MCV 85.5 fL      MCH 26.8 pg      MCHC 31.3 g/dL      RDW 14.9 %      RDW-SD 45.9 fl      MPV  10.4 fL      Platelets 461 10*3/mm3      Neutrophil % 75.8 %      Lymphocyte % 15.1 %      Monocyte % 7.7 %      Eosinophil % 0.5 %      Basophil % 0.5 %      Immature Grans % 0.4 %      Neutrophils, Absolute 7.73 10*3/mm3      Lymphocytes, Absolute 1.54 10*3/mm3      Monocytes, Absolute 0.79 10*3/mm3      Eosinophils, Absolute 0.05 10*3/mm3      Basophils, Absolute 0.05 10*3/mm3      Immature Grans, Absolute 0.04 10*3/mm3      nRBC 0.0 /100 WBC         ECG/EMG Results (last 24 hours)     Procedure Component Value Units Date/Time    ECG 12 Lead [[id number]] Collected:  07/10/20 1322     Updated:  07/10/20 1452    Narrative:       Test Reason : SOA Protocol  Blood Pressure : **/** mmHG  Vent. Rate : 103 BPM     Atrial Rate : 103 BPM     P-R Int : 142 ms          QRS Dur : 148 ms      QT Int : 386 ms       P-R-T Axes : 017 -04 171 degrees     QTc Int : 505 ms    Sinus tachycardia with fusion complexes  Left bundle branch block  Abnormal ECG  When compared with ECG of 05-MAR-2014 02:18,  fusion complexes are now present  premature ventricular complexes are no longer present  Left bundle branch block is now present  Confirmed by LETTY SALGADO MD ([id number]) on 7/10/2020 2:51:48 PM    Referred By:  EDMD           Confirmed By:LETTY SALGADO MD               Pneumonia of right upper lobe due to infectious organism    Acute heart failure (CMS/HCC)    LBBB (left bundle branch block)    Abnormal TSH    Anemia, unspecified    Acute respiratory failure with hypoxia (CMS/HCC)          Bobby Peralta III, MD   10:37 7/11/2020

## 2020-07-11 NOTE — PROGRESS NOTES
[facility] Medicine Services  PROGRESS NOTE    Patient Name: Valeria Bobby  : 1950  MRN: [id number]    Date of Admission: 7/10/2020  Primary Care Physician: Lor Barreto MD    Subjective   Subjective     CC:  SOA     HPI:  No acute events overnight.  States that she overall feels much better.  Breathing is improved and is anxious to find out what is going on.    Review of Systems  Gen- No fevers, chills  CV- No chest pain, palpitations  Resp- No cough, dyspnea  GI- No N/V/D, abd pain     Objective   Objective     Vital Signs:   Temp:  [97.4 °F (36.3 °C)-98.8 °F (37.1 °C)] 97.4 °F (36.3 °C)  Heart Rate:  [] 83  Resp:  [18-20] 18  BP: (107-126)/(49-92) 122/68        Physical Exam:  Constitutional: No acute distress, awake, alert  HENT: NCAT, mucous membranes moist  Respiratory: Clear to auscultation bilaterally, respiratory effort normal   Cardiovascular: RRR, no murmurs, rubs, or gallops, palpable pedal pulses bilaterally  Gastrointestinal: Positive bowel sounds, soft, nontender, nondistended  Musculoskeletal: No bilateral ankle edema  Psychiatric: Appropriate affect, cooperative  Neurologic: Oriented x 3, strength symmetric in all extremities, Cranial Nerves grossly intact to confrontation, speech clear  Skin: No rashes    Results Reviewed:  Results from last 7 days   Lab Units 07/10/20  1353   WBC 10*3/mm3 10.20   HEMOGLOBIN g/dL 10.9*   HEMATOCRIT % 34.8   PLATELETS 10*3/mm3 461*   PROCALCITONIN ng/mL 0.10     Results from last 7 days   Lab Units 20  1151 07/10/20  1353   SODIUM mmol/L 138 140   POTASSIUM mmol/L 3.8 3.8   CHLORIDE mmol/L 100 101   CO2 mmol/L 23.0 25.0   BUN mg/dL 19 17   CREATININE mg/dL 1.19* 1.14*   GLUCOSE mg/dL 108* 145*   CALCIUM mg/dL 9.9 9.8   ALT (SGPT) U/L  --  20   AST (SGOT) U/L  --  29   TROPONIN T ng/mL  --  <0.010   PROBNP pg/mL  --  11,999.0*     Estimated Creatinine Clearance: 39 mL/min (A) (by C-G formula based on SCr of  1.19 mg/dL (H)).    Microbiology Results Abnormal     None          Imaging Results (Last 24 Hours)     Procedure Component Value Units Date/Time    CT Angiogram Chest [[id number]] Collected:  07/10/20 1600     Updated:  07/10/20 2030    Narrative:       EXAMINATION: CT ANGIOGRAM CHEST - 07/10/2020     INDICATION: Pleuritic chest pain, tachycardia and shortness of breath.      TECHNIQUE: CT angiogram chest with intravenous contrast following PE  protocol. 2D reconstructions performed.     The radiation dose reduction device was turned on for each scan per the  ALARA (As Low as Reasonably Achievable) protocol.     COMPARISON: NONE     FINDINGS: Thyroid is homogeneous in attenuation. Enlarged mediastinal  and subcarinal lymph nodes as well as right hilar adenopathy with a  right paratracheal 1.7 cm node in a subcarinal 3 cm node with central  airways remaining grossly patent. Esophagus in normal course and  caliber. Atherosclerotic nonaneurysmal thoracic aorta. Satisfactory  opacification of the pulmonary arterial tree on contrast bolus timing  without filling defect to suggest pulmonary embolus. Cardiac size  enlarged with four-chamber cardiomegaly however no pericardial effusion.  Scattered opacifications within the right upper lobe posteriorly and  bilateral lower lungs somewhat indeterminate but likely represent  atypical airspace disease without dominant nodule or mass. Small  bilateral pleural effusions. Degenerative changes of the thoracic spine  without aggressive osseous lesion. No soft tissue body wall findings of  concern, specifically no bulky axillary adenopathy. Visualized portions  of the upper abdomen have noted reflux of contrast into the hepatic  veins and intrahepatic IVC.       Impression:       1. No PE.  2. Four-chamber cardiomegaly with smooth intralobular septal thickening  of interstitial edema pattern with small bilateral pleural effusions.  3. Scattered opacifications greatest in the right  upper lobe posteriorly  and right lower lungs somewhat ill-defined may represent areas of mild  asymmetric edema pattern versus airspace disease with atypical etiology  not excluded. Indeterminate association for Covid 19.      DICTATED:   07/10/2020  EDITED/ls :   07/10/2020                      I have reviewed the medications:  Scheduled Meds:  carvedilol 6.25 mg Oral BID With Meals   cetirizine 5 mg Oral Daily   doxycycline 100 mg Oral Q12H   furosemide 40 mg Oral Daily   heparin (porcine) 5,000 Units Subcutaneous Q12H   montelukast 10 mg Oral Nightly   venlafaxine 75 mg Oral Daily     Continuous Infusions:   PRN Meds:.sodium chloride    Assessment/Plan   Assessment & Plan     Active Hospital Problems    Diagnosis  POA   • Pneumonia of right upper lobe due to infectious organism [J18.9]  Yes   • Acute heart failure (CMS/HCC) [I50.9]  Yes   • LBBB (left bundle branch block) [I44.7]  Yes   • Abnormal TSH [R79.89]  Yes   • Anemia, unspecified [D64.9]  Yes   • Acute respiratory failure with hypoxia (CMS/HCC) [J96.01]  Yes      Resolved Hospital Problems   No resolved problems to display.        Brief Hospital Course to date:  68 yo with history of tachycardia status post AV ablation now presents with acute heart failure.  Which could be possible Takotsubo-or cardiomyopathy related to tachyarrhythmia.        New Heart failure of unknown etiology  New LBBB   - Cardiology following  - trop negative; EKG reviewed  - ECHO pending   - Received IV diuresis on admission; change to lasix PO 40 mg daily   - Coreg 6.25 mg BID   - pre-procedure COVID pending     Possible PNA   - Noted on CT chest   - no fever, no leukocytosis   - BCx pending   - Continue rocephin/doxy; consider de-escalating quickly      Anxiety, depression, grief  - all seems appropriate     Abnormal TSH  - in light of her heart failure will not automatically treat an elevated TSH, but consider outpatient follow-up when she is more stable.   -T4 within limits;  needs repeat with PCP         DVT prophylaxis: Lovenox     CODE STATUS: Full full code      CODE STATUS:   Code Status and Medical Interventions:   Ordered at: 07/10/20 1758     Code Status:    CPR     Medical Interventions (Level of Support Prior to Arrest):    Full         Electronically signed by Danay Soctt DO, 07/11/20, 13:51.

## 2020-07-12 LAB
ANION GAP SERPL CALCULATED.3IONS-SCNC: 14 MMOL/L (ref 5–15)
BH CV ECHO MEAS - AO ROOT AREA (BSA CORRECTED): 1.8
BH CV ECHO MEAS - AO ROOT AREA: 6.8 CM^2
BH CV ECHO MEAS - AO ROOT DIAM: 2.9 CM
BH CV ECHO MEAS - ASC AORTA: 2.9 CM
BH CV ECHO MEAS - BSA(HAYCOCK): 1.7 M^2
BH CV ECHO MEAS - BSA: 1.6 M^2
BH CV ECHO MEAS - BZI_BMI: 26.3 KILOGRAMS/M^2
BH CV ECHO MEAS - BZI_METRIC_HEIGHT: 154.9 CM
BH CV ECHO MEAS - BZI_METRIC_WEIGHT: 63.1 KG
BH CV ECHO MEAS - EDV(CUBED): 246 ML
BH CV ECHO MEAS - EDV(MOD-SP2): 123 ML
BH CV ECHO MEAS - EDV(MOD-SP4): 145 ML
BH CV ECHO MEAS - EDV(TEICH): 198.7 ML
BH CV ECHO MEAS - EF(CUBED): 24.8 %
BH CV ECHO MEAS - EF(MOD-BP): 20 %
BH CV ECHO MEAS - EF(MOD-SP2): 13 %
BH CV ECHO MEAS - EF(MOD-SP4): 20.7 %
BH CV ECHO MEAS - EF(TEICH): 19.5 %
BH CV ECHO MEAS - EF{MOD-BP}: 18 %
BH CV ECHO MEAS - ESV(CUBED): 185 ML
BH CV ECHO MEAS - ESV(MOD-SP2): 107 ML
BH CV ECHO MEAS - ESV(MOD-SP4): 115 ML
BH CV ECHO MEAS - ESV(TEICH): 159.9 ML
BH CV ECHO MEAS - FS: 9.1 %
BH CV ECHO MEAS - IVS/LVPW: 0.9
BH CV ECHO MEAS - IVSD: 0.72 CM
BH CV ECHO MEAS - LA DIMENSION: 4.5 CM
BH CV ECHO MEAS - LA/AO: 1.5
BH CV ECHO MEAS - LAD MAJOR: 5.8 CM
BH CV ECHO MEAS - LV DIASTOLIC VOL/BSA (35-75): 89.6 ML/M^2
BH CV ECHO MEAS - LV MASS(C)D: 188.3 GRAMS
BH CV ECHO MEAS - LV MASS(C)DI: 116.4 GRAMS/M^2
BH CV ECHO MEAS - LV SYSTOLIC VOL/BSA (12-30): 71.1 ML/M^2
BH CV ECHO MEAS - LVIDD: 6.3 CM
BH CV ECHO MEAS - LVIDS: 5.7 CM
BH CV ECHO MEAS - LVLD AP2: 7.2 CM
BH CV ECHO MEAS - LVLD AP4: 7 CM
BH CV ECHO MEAS - LVLS AP2: 7 CM
BH CV ECHO MEAS - LVLS AP4: 6.8 CM
BH CV ECHO MEAS - LVOT AREA (M): 2.5 CM^2
BH CV ECHO MEAS - LVOT AREA: 2.5 CM^2
BH CV ECHO MEAS - LVOT DIAM: 1.8 CM
BH CV ECHO MEAS - LVPWD: 0.8 CM
BH CV ECHO MEAS - SI(CUBED): 37.7 ML/M^2
BH CV ECHO MEAS - SI(MOD-SP2): 9.9 ML/M^2
BH CV ECHO MEAS - SI(MOD-SP4): 18.5 ML/M^2
BH CV ECHO MEAS - SI(TEICH): 24 ML/M^2
BH CV ECHO MEAS - SV(CUBED): 61 ML
BH CV ECHO MEAS - SV(MOD-SP2): 16 ML
BH CV ECHO MEAS - SV(MOD-SP4): 30 ML
BH CV ECHO MEAS - SV(TEICH): 38.8 ML
BUN SERPL-MCNC: 35 MG/DL (ref 8–23)
BUN/CREAT SERPL: 25 (ref 7–25)
CALCIUM SPEC-SCNC: 9.4 MG/DL (ref 8.6–10.5)
CHLORIDE SERPL-SCNC: 105 MMOL/L (ref 98–107)
CO2 SERPL-SCNC: 23 MMOL/L (ref 22–29)
CREAT SERPL-MCNC: 1.4 MG/DL (ref 0.57–1)
GFR SERPL CREATININE-BSD FRML MDRD: 38 ML/MIN/1.73
GLUCOSE SERPL-MCNC: 109 MG/DL (ref 65–99)
POTASSIUM SERPL-SCNC: 3.8 MMOL/L (ref 3.5–5.2)
SODIUM SERPL-SCNC: 142 MMOL/L (ref 136–145)

## 2020-07-12 PROCEDURE — 25010000002 HEPARIN (PORCINE) PER 1000 UNITS: Performed by: INTERNAL MEDICINE

## 2020-07-12 PROCEDURE — 80048 BASIC METABOLIC PNL TOTAL CA: CPT | Performed by: INTERNAL MEDICINE

## 2020-07-12 PROCEDURE — 99232 SBSQ HOSP IP/OBS MODERATE 35: CPT | Performed by: INTERNAL MEDICINE

## 2020-07-12 RX ORDER — ACETAMINOPHEN 325 MG/1
650 TABLET ORAL EVERY 6 HOURS PRN
Status: DISCONTINUED | OUTPATIENT
Start: 2020-07-12 | End: 2020-07-13 | Stop reason: SDUPTHER

## 2020-07-12 RX ADMIN — CARVEDILOL 6.25 MG: 6.25 TABLET, FILM COATED ORAL at 08:34

## 2020-07-12 RX ADMIN — VENLAFAXINE 75 MG: 37.5 TABLET ORAL at 08:34

## 2020-07-12 RX ADMIN — HEPARIN SODIUM 5000 UNITS: 5000 INJECTION, SOLUTION INTRAVENOUS; SUBCUTANEOUS at 08:33

## 2020-07-12 RX ADMIN — CETIRIZINE HYDROCHLORIDE 5 MG: 10 TABLET, FILM COATED ORAL at 08:34

## 2020-07-12 RX ADMIN — DOXYCYCLINE 100 MG: 100 CAPSULE ORAL at 08:34

## 2020-07-12 RX ADMIN — MONTELUKAST SODIUM 10 MG: 10 TABLET, COATED ORAL at 21:40

## 2020-07-12 RX ADMIN — SODIUM CHLORIDE, PRESERVATIVE FREE 10 ML: 5 INJECTION INTRAVENOUS at 21:40

## 2020-07-12 RX ADMIN — HEPARIN SODIUM 5000 UNITS: 5000 INJECTION, SOLUTION INTRAVENOUS; SUBCUTANEOUS at 21:40

## 2020-07-12 RX ADMIN — CARVEDILOL 6.25 MG: 6.25 TABLET, FILM COATED ORAL at 17:58

## 2020-07-12 NOTE — PROGRESS NOTES
"[facility] Progress Note     LOS: 2 days   Patient Care Team:  Lor Barreto MD as PCP - General (Obstetrics and Gynecology)  PCP:  Lor Barreto MD    Chief Complaint: Dyspnea    SUBJECTIVE: Shortness of breath has improved.  No chest discomfort overnight, slept well.  Telemetry reveals sinus rhythm with occasional PVCs.      Review of Systems:   All systems have been reviewed and are negative with the exception of those mentioned above.      OBJECTIVE:    Vital Sign Min/Max for last 24 hours  Temp  Min: 97.4 °F (36.3 °C)  Max: 98.4 °F (36.9 °C)   BP  Min: 106/58  Max: 123/72   Pulse  Min: 76  Max: 108   Resp  Min: 16  Max: 18   SpO2  Min: 91 %  Max: 98 %   No data recorded   Weight  Min: 63 kg (139 lb)  Max: 63.5 kg (140 lb 1.6 oz)     Flowsheet Rows      First Filed Value   Admission Height  154.9 cm (61\") Documented at 07/10/2020 1314   Admission Weight  63.5 kg (140 lb) Documented at 07/10/2020 1314              Intake/Output Summary (Last 24 hours) at 7/12/2020 0853  Last data filed at 7/12/2020 0800  Gross per 24 hour   Intake --   Output 550 ml   Net -550 ml     Intake & Output (last 3 days)       07/09 0701 - 07/10 0700 07/10 0701 - 07/11 0700 07/11 0701 - 07/12 0700 07/12 0701 - 07/13 0700    Urine (mL/kg/hr)   350 (0.2) 200 (1.7)    Total Output   350 200    Net   -350 -200            Urine Unmeasured Occurrence  7 x             Physical Exam:    General Appearance:    Alert, cooperative, no distress, appears stated age   Neck:   Supple, symmetrical, trachea midline.   Lungs:     Clear to auscultation bilaterally, respirations unlabored   Chest Wall:    No tenderness or deformity    Heart:    Regular rate and rhythm, S1 and S2 normal, no murmur, rub   or gallop, normal carotid impulse bilaterally without bruit.   Extremities:   Extremities normal, atraumatic, no cyanosis or edema   Pulses:   2+ and symmetric all extremities   Skin:   Skin color, texture, " turgor normal, no rashes or lesions      LABS/DIAGNOSTIC DATA:  Results from last 7 days   Lab Units 07/10/20  1353   WBC 10*3/mm3 10.20   HEMOGLOBIN g/dL 10.9*   HEMATOCRIT % 34.8   PLATELETS 10*3/mm3 461*     Lab Results   Lab Value Date/Time    TROPONINT <0.010 07/10/2020 1353         Results from last 7 days   Lab Units 07/12/20  0337 07/11/20  1151 07/10/20  1353   SODIUM mmol/L 142 138 140   POTASSIUM mmol/L 3.8 3.8 3.8   CHLORIDE mmol/L 105 100 101   CO2 mmol/L 23.0 23.0 25.0   BUN mg/dL 35* 19 17   CREATININE mg/dL 1.40* 1.19* 1.14*   CALCIUM mg/dL 9.4 9.9 9.8   BILIRUBIN mg/dL  --   --  0.7   ALK PHOS U/L  --   --  127*   ALT (SGPT) U/L  --   --  20   AST (SGOT) U/L  --   --  29   GLUCOSE mg/dL 109* 108* 145*             Results from last 7 days   Lab Units 07/11/20  0443 07/10/20  1353   TSH uIU/mL  --  7.730*   FREE T4 ng/dL 1.11  --            Medication Review:     carvedilol 6.25 mg Oral BID With Meals   cetirizine 5 mg Oral Daily   doxycycline 100 mg Oral Q12H   heparin (porcine) 5,000 Units Subcutaneous Q12H   montelukast 10 mg Oral Nightly   venlafaxine 75 mg Oral Daily            ASSESSMENT/PLAN:    Pneumonia of right upper lobe due to infectious organism    Acute heart failure (CMS/HCC)    LBBB (left bundle branch block)    Abnormal TSH    Anemia, unspecified    Acute respiratory failure with hypoxia (CMS/HCC)        Dilated cardiomyopathy with acute heart failure with reduced ejection fraction: Developing ANA with ongoing diuresis, holding Lasix today.  Tolerating carvedilol.  Recommending definitive evaluation of coronary anatomy with cardiac catheterization tomorrow.  We will add ACE/arb/Arni and Aldactone as blood pressure and renal function tolerates.  LifeVest at discharge.          Bobby Peralta III, MD   07/12/20  08:53

## 2020-07-12 NOTE — PROGRESS NOTES
[facility] Medicine Services  PROGRESS NOTE    Patient Name: Valeria Bobby  : 1950  MRN: [id number]    Date of Admission: 7/10/2020  Primary Care Physician: Lor Barreto MD    Subjective   Subjective     CC:  SOA     HPI:  No acute events.  States that she is overall feeling fine today.  She is able to sleep laying flat with improved shortness of breath.  Having left heart cath tomorrow and is anxious to know what her limitations will be and the plan moving forward.    Review of Systems  Gen- No fevers, chills  CV- No chest pain, palpitations  Resp- No cough, dyspnea  GI- No N/V/D, abd pain     Objective   Objective     Vital Signs:   Temp:  [97.9 °F (36.6 °C)-98.4 °F (36.9 °C)] 97.9 °F (36.6 °C)  Heart Rate:  [] 71  Resp:  [16-18] 18  BP: (105-123)/(58-75) 105/63        Physical Exam:  Constitutional: No acute distress, awake, alert  HENT: NCAT, mucous membranes moist  Respiratory: Clear to auscultation bilaterally, respiratory effort normal   Cardiovascular: RRR, no murmurs, rubs, or gallops, palpable pedal pulses bilaterally  Gastrointestinal: Positive bowel sounds, soft, nontender, nondistended  Musculoskeletal: No bilateral ankle edema  Psychiatric: Appropriate affect, cooperative  Neurologic: Oriented x 3, strength symmetric in all extremities, Cranial Nerves grossly intact to confrontation, speech clear  Skin: No rashes    Results Reviewed:  Results from last 7 days   Lab Units 07/10/20  1353   WBC 10*3/mm3 10.20   HEMOGLOBIN g/dL 10.9*   HEMATOCRIT % 34.8   PLATELETS 10*3/mm3 461*   PROCALCITONIN ng/mL 0.10     Results from last 7 days   Lab Units 20  0337 20  1151 07/10/20  1353   SODIUM mmol/L 142 138 140   POTASSIUM mmol/L 3.8 3.8 3.8   CHLORIDE mmol/L 105 100 101   CO2 mmol/L 23.0 23.0 25.0   BUN mg/dL 35* 19 17   CREATININE mg/dL 1.40* 1.19* 1.14*   GLUCOSE mg/dL 109* 108* 145*   CALCIUM mg/dL 9.4 9.9 9.8   ALT (SGPT) U/L  --   --  20   AST  (SGOT) U/L  --   --  29   TROPONIN T ng/mL  --   --  <0.010   PROBNP pg/mL  --   --  11,999.0*     Estimated Creatinine Clearance: 33.3 mL/min (A) (by C-G formula based on SCr of 1.4 mg/dL (H)).    Microbiology Results Abnormal     Procedure Component Value - Date/Time    COVID PRE-OP / PRE-PROCEDURE SCREENING ORDER (NO ISOLATION) - Swab, Nasopharynx [[id number]] Collected:  07/10/20 1819    Lab Status:  Final result Specimen:  Swab from Nasopharynx Updated:  07/11/20 1647    Narrative:       The following orders were created for panel order COVID PRE-OP / PRE-PROCEDURE SCREENING ORDER (NO ISOLATION) - Swab, Nasopharynx.  Procedure                               Abnormality         Status                     ---------                               -----------         ------                     COVID-19,[facility], NP ...[[id number]]                      Final result                 Please view results for these tests on the individual orders.    COVID-19,[facility], NP SWAB IN LEXAR SALINE MEDIA 24-30 HR TAT - Swab, Nasopharynx [[id number]] Collected:  07/10/20 1819    Lab Status:  Final result Specimen:  Swab from Nasopharynx Updated:  07/11/20 1647     Reference Lab Report --     Comment: See scanned report          COVID19 Not Detected    Blood Culture - Blood, Arm, Right [[id number]] Collected:  07/10/20 1636    Lab Status:  Preliminary result Specimen:  Blood from Arm, Right Updated:  07/11/20 1645     Blood Culture No growth at 24 hours    Blood Culture - Blood, Arm, Left [[id number]] Collected:  07/10/20 1636    Lab Status:  Preliminary result Specimen:  Blood from Arm, Left Updated:  07/11/20 1645     Blood Culture No growth at 24 hours          Imaging Results (Last 24 Hours)     Procedure Component Value Units Date/Time    CT Angiogram Chest [[id number]] Collected:  07/10/20 1600     Updated:  07/11/20 1636    Narrative:       EXAMINATION: CT ANGIOGRAM CHEST - 07/10/2020     INDICATION: Pleuritic chest pain,  tachycardia and shortness of breath.      TECHNIQUE: CT angiogram chest with intravenous contrast following PE  protocol. 2D reconstructions performed.     The radiation dose reduction device was turned on for each scan per the  ALARA (As Low as Reasonably Achievable) protocol.     COMPARISON: NONE     FINDINGS: Thyroid is homogeneous in attenuation. Enlarged mediastinal  and subcarinal lymph nodes as well as right hilar adenopathy with a  right paratracheal 1.7 cm node in a subcarinal 3 cm node with central  airways remaining grossly patent. Esophagus in normal course and  caliber. Atherosclerotic nonaneurysmal thoracic aorta. Satisfactory  opacification of the pulmonary arterial tree on contrast bolus timing  without filling defect to suggest pulmonary embolus. Cardiac size  enlarged with four-chamber cardiomegaly however no pericardial effusion.  Scattered opacifications within the right upper lobe posteriorly and  bilateral lower lungs somewhat indeterminate but likely represent  atypical airspace disease without dominant nodule or mass. Small  bilateral pleural effusions. Degenerative changes of the thoracic spine  without aggressive osseous lesion. No soft tissue body wall findings of  concern, specifically no bulky axillary adenopathy. Visualized portions  of the upper abdomen have noted reflux of contrast into the hepatic  veins and intrahepatic IVC.       Impression:       1. No PE.  2. Four-chamber cardiomegaly with smooth intralobular septal thickening  of interstitial edema pattern with small bilateral pleural effusions.  3. Scattered opacifications greatest in the right upper lobe posteriorly  and right lower lungs somewhat ill-defined may represent areas of mild  asymmetric edema pattern versus airspace disease with atypical etiology  not excluded. Indeterminate association for Covid 19.      DICTATED:   07/10/2020  EDITED/ls :   07/10/2020         This report was finalized on 7/11/2020 4:33 PM by   Maxi Jack.             Results for orders placed during the hospital encounter of 07/10/20   Adult Transthoracic Echo Limited W/ Cont if Necessary Per Protocol    Narrative · Left ventricular systolic function is severely decreased.  · Calculated EF = 20%. Estimated EF was in agreement with the calculated   EF.  · The left ventricular cavity is moderately dilated.  · Left atrial cavity size is moderately dilated.  · Mild mitral valve regurgitation is present          I have reviewed the medications:  Scheduled Meds:  carvedilol 6.25 mg Oral BID With Meals   cetirizine 5 mg Oral Daily   doxycycline 100 mg Oral Q12H   heparin (porcine) 5,000 Units Subcutaneous Q12H   montelukast 10 mg Oral Nightly   venlafaxine 75 mg Oral Daily     Continuous Infusions:   PRN Meds:.sodium chloride    Assessment/Plan   Assessment & Plan     Active Hospital Problems    Diagnosis  POA   • Pneumonia of right upper lobe due to infectious organism [J18.9]  Yes   • Acute heart failure (CMS/HCC) [I50.9]  Yes   • LBBB (left bundle branch block) [I44.7]  Yes   • Abnormal TSH [R79.89]  Yes   • Anemia, unspecified [D64.9]  Yes   • Acute respiratory failure with hypoxia (CMS/HCC) [J96.01]  Yes      Resolved Hospital Problems   No resolved problems to display.        Brief Hospital Course to date:  68 yo with history of tachycardia status post AV ablation now presents with acute heart failure.  Which could be possible Takotsubo-or cardiomyopathy related to tachyarrhythmia.        New Heart failure of unknown etiology  New LBBB   - Cardiology following  - trop negative; EKG reviewed  - ECHO EF 20%  - Received IV diuresis but currently holding due to ANA   - Coreg 6.25 mg BID   - [facility] 7/13; NPO at midnight   - heart failure navigator   - Will need lifevest     ANA   - Related to diuresis; holding lasix; monitor      Possible PNA   - Noted on CT chest -- likely fluid   - no fever, no leukocytosis   - BCx NGTD  - was on doxy and rocephin (7/10) --  will DC and monitor off antibiotics      Anxiety, depression, grief  - all seems appropriate     Abnormal TSH  - in light of her heart failure will not automatically treat an elevated TSH, but consider outpatient follow-up when she is more stable.   -T4 within limits; needs repeat with PCP         DVT prophylaxis: Lovenox     CODE STATUS: Full full code    Dispo: likely home in 1-2 days pending results of [facility]     CODE STATUS:   Code Status and Medical Interventions:   Ordered at: 07/10/20 1758     Code Status:    CPR     Medical Interventions (Level of Support Prior to Arrest):    Full         Electronically signed by Danay Scott DO, 07/12/20, 12:27.

## 2020-07-13 PROBLEM — I50.21 ACUTE SYSTOLIC HEART FAILURE (HCC): Status: ACTIVE | Noted: 2020-07-10

## 2020-07-13 LAB
ANION GAP SERPL CALCULATED.3IONS-SCNC: 11 MMOL/L (ref 5–15)
BUN SERPL-MCNC: 43 MG/DL (ref 8–23)
BUN/CREAT SERPL: 36.4 (ref 7–25)
CALCIUM SPEC-SCNC: 9.5 MG/DL (ref 8.6–10.5)
CHLORIDE SERPL-SCNC: 105 MMOL/L (ref 98–107)
CO2 SERPL-SCNC: 22 MMOL/L (ref 22–29)
CREAT SERPL-MCNC: 1.18 MG/DL (ref 0.57–1)
GFR SERPL CREATININE-BSD FRML MDRD: 46 ML/MIN/1.73
GLUCOSE SERPL-MCNC: 99 MG/DL (ref 65–99)
POTASSIUM SERPL-SCNC: 4.1 MMOL/L (ref 3.5–5.2)
SODIUM SERPL-SCNC: 138 MMOL/L (ref 136–145)

## 2020-07-13 PROCEDURE — 25010000003 LIDOCAINE 1 % SOLUTION: Performed by: INTERNAL MEDICINE

## 2020-07-13 PROCEDURE — 25010000002 FENTANYL CITRATE (PF) 100 MCG/2ML SOLUTION: Performed by: INTERNAL MEDICINE

## 2020-07-13 PROCEDURE — B2111ZZ FLUOROSCOPY OF MULTIPLE CORONARY ARTERIES USING LOW OSMOLAR CONTRAST: ICD-10-PCS | Performed by: INTERNAL MEDICINE

## 2020-07-13 PROCEDURE — 25010000002 MIDAZOLAM PER 1 MG: Performed by: INTERNAL MEDICINE

## 2020-07-13 PROCEDURE — C1894 INTRO/SHEATH, NON-LASER: HCPCS | Performed by: INTERNAL MEDICINE

## 2020-07-13 PROCEDURE — 4A023N7 MEASUREMENT OF CARDIAC SAMPLING AND PRESSURE, LEFT HEART, PERCUTANEOUS APPROACH: ICD-10-PCS | Performed by: INTERNAL MEDICINE

## 2020-07-13 PROCEDURE — 80048 BASIC METABOLIC PNL TOTAL CA: CPT | Performed by: INTERNAL MEDICINE

## 2020-07-13 PROCEDURE — 93458 L HRT ARTERY/VENTRICLE ANGIO: CPT | Performed by: INTERNAL MEDICINE

## 2020-07-13 PROCEDURE — C1760 CLOSURE DEV, VASC: HCPCS | Performed by: INTERNAL MEDICINE

## 2020-07-13 PROCEDURE — 99232 SBSQ HOSP IP/OBS MODERATE 35: CPT | Performed by: INTERNAL MEDICINE

## 2020-07-13 PROCEDURE — 25010000002 HEPARIN (PORCINE) PER 1000 UNITS: Performed by: INTERNAL MEDICINE

## 2020-07-13 PROCEDURE — 0 IOPAMIDOL PER 1 ML: Performed by: INTERNAL MEDICINE

## 2020-07-13 RX ORDER — LIDOCAINE HYDROCHLORIDE 10 MG/ML
INJECTION, SOLUTION INFILTRATION; PERINEURAL AS NEEDED
Status: DISCONTINUED | OUTPATIENT
Start: 2020-07-13 | End: 2020-07-13 | Stop reason: HOSPADM

## 2020-07-13 RX ORDER — FENTANYL CITRATE 50 UG/ML
INJECTION, SOLUTION INTRAMUSCULAR; INTRAVENOUS AS NEEDED
Status: DISCONTINUED | OUTPATIENT
Start: 2020-07-13 | End: 2020-07-13 | Stop reason: HOSPADM

## 2020-07-13 RX ORDER — ACETAMINOPHEN 325 MG/1
650 TABLET ORAL EVERY 4 HOURS PRN
Status: DISCONTINUED | OUTPATIENT
Start: 2020-07-13 | End: 2020-07-14 | Stop reason: HOSPADM

## 2020-07-13 RX ORDER — MIDAZOLAM HYDROCHLORIDE 1 MG/ML
INJECTION INTRAMUSCULAR; INTRAVENOUS AS NEEDED
Status: DISCONTINUED | OUTPATIENT
Start: 2020-07-13 | End: 2020-07-13 | Stop reason: HOSPADM

## 2020-07-13 RX ADMIN — CETIRIZINE HYDROCHLORIDE 5 MG: 10 TABLET, FILM COATED ORAL at 16:06

## 2020-07-13 RX ADMIN — HEPARIN SODIUM 5000 UNITS: 5000 INJECTION, SOLUTION INTRAVENOUS; SUBCUTANEOUS at 20:21

## 2020-07-13 RX ADMIN — MONTELUKAST SODIUM 10 MG: 10 TABLET, COATED ORAL at 20:21

## 2020-07-13 RX ADMIN — VENLAFAXINE 75 MG: 37.5 TABLET ORAL at 16:05

## 2020-07-13 RX ADMIN — CARVEDILOL 6.25 MG: 6.25 TABLET, FILM COATED ORAL at 09:06

## 2020-07-13 RX ADMIN — CARVEDILOL 6.25 MG: 6.25 TABLET, FILM COATED ORAL at 18:39

## 2020-07-13 RX ADMIN — SODIUM CHLORIDE, PRESERVATIVE FREE 10 ML: 5 INJECTION INTRAVENOUS at 20:21

## 2020-07-13 NOTE — PROGRESS NOTES
[facility] Medicine Services  PROGRESS NOTE    Patient Name: Valeria Bobby  : 1950  MRN: [id number]    Date of Admission: 7/10/2020  Primary Care Physician: Lor Pulliam APRN    Subjective   Subjective     CC:  SOA     HPI:  No acute events this morning or overnight. Sitting up in bedside chair. Breathing is much improved from admission and now on room air. No chest pain. Anticipating heart cath today.    Review of Systems  Gen- No fevers, chills  CV- No chest pain, palpitations  Resp- No cough, dyspnea  GI- No N/V/D, abd pain    Objective   Objective     Vital Signs:   Temp:  [97.5 °F (36.4 °C)-98.2 °F (36.8 °C)] 97.5 °F (36.4 °C)  Heart Rate:  [75-80] 79  Resp:  [18] 18  BP: (101-119)/(64-80) 108/64        Physical Exam:  Constitutional: No acute distress, awake, alert  HENT: NCAT, mucous membranes moist  Respiratory: Clear to auscultation bilaterally, respiratory effort normal   Cardiovascular: RRR, no murmurs, rubs, or gallops, palpable pedal pulses bilaterally  Gastrointestinal: Positive bowel sounds, soft, nontender, nondistended  Musculoskeletal: No bilateral ankle edema  Psychiatric: Appropriate affect, cooperative  Neurologic: Oriented x 3, strength symmetric in all extremities, Cranial Nerves grossly intact to confrontation, speech clear  Skin: No rashes        Results Reviewed:  Results from last 7 days   Lab Units 07/10/20  1353   WBC 10*3/mm3 10.20   HEMOGLOBIN g/dL 10.9*   HEMATOCRIT % 34.8   PLATELETS 10*3/mm3 461*   PROCALCITONIN ng/mL 0.10     Results from last 7 days   Lab Units 20  0544 20  0337 20  1151 07/10/20  1353   SODIUM mmol/L 138 142 138 140   POTASSIUM mmol/L 4.1 3.8 3.8 3.8   CHLORIDE mmol/L 105 105 100 101   CO2 mmol/L 22.0 23.0 23.0 25.0   BUN mg/dL 43* 35* 19 17   CREATININE mg/dL 1.18* 1.40* 1.19* 1.14*   GLUCOSE mg/dL 99 109* 108* 145*   CALCIUM mg/dL 9.5 9.4 9.9 9.8   ALT (SGPT) U/L  --   --   --  20   AST (SGOT) U/L   --   --   --  29   TROPONIN T ng/mL  --   --   --  <0.010   PROBNP pg/mL  --   --   --  11,999.0*     Estimated Creatinine Clearance: 39.7 mL/min (A) (by C-G formula based on SCr of 1.18 mg/dL (H)).    Microbiology Results Abnormal     Procedure Component Value - Date/Time    Blood Culture - Blood, Arm, Right [[id number]] Collected:  07/10/20 1636    Lab Status:  Preliminary result Specimen:  Blood from Arm, Right Updated:  07/12/20 1645     Blood Culture No growth at 2 days    Blood Culture - Blood, Arm, Left [[id number]] Collected:  07/10/20 1636    Lab Status:  Preliminary result Specimen:  Blood from Arm, Left Updated:  07/12/20 1645     Blood Culture No growth at 2 days    COVID PRE-OP / PRE-PROCEDURE SCREENING ORDER (NO ISOLATION) - Swab, Nasopharynx [[id number]] Collected:  07/10/20 1819    Lab Status:  Final result Specimen:  Swab from Nasopharynx Updated:  07/11/20 1647    Narrative:       The following orders were created for panel order COVID PRE-OP / PRE-PROCEDURE SCREENING ORDER (NO ISOLATION) - Swab, Nasopharynx.  Procedure                               Abnormality         Status                     ---------                               -----------         ------                     COVID-19,[facility], NP ...[[id number]]                      Final result                 Please view results for these tests on the individual orders.    COVID-19,[facility], NP SWAB IN LEXAR SALINE MEDIA 24-30 HR TAT - Swab, Nasopharynx [[id number]] Collected:  07/10/20 1819    Lab Status:  Final result Specimen:  Swab from Nasopharynx Updated:  07/11/20 1647     Reference Lab Report --     Comment: See scanned report          COVID19 Not Detected          Imaging Results (Last 24 Hours)     ** No results found for the last 24 hours. **          Results for orders placed during the hospital encounter of 07/10/20   Adult Transthoracic Echo Limited W/ Cont if Necessary Per Protocol    Narrative · Left ventricular systolic  function is severely decreased.  · Calculated EF = 20%. Estimated EF was in agreement with the calculated   EF.  · The left ventricular cavity is moderately dilated.  · Left atrial cavity size is moderately dilated.  · Mild mitral valve regurgitation is present          I have reviewed the medications:  Scheduled Meds:    carvedilol 6.25 mg Oral BID With Meals   cetirizine 5 mg Oral Daily   heparin (porcine) 5,000 Units Subcutaneous Q12H   montelukast 10 mg Oral Nightly   venlafaxine 75 mg Oral Daily     Continuous Infusions:   PRN Meds:.•  acetaminophen  •  sodium chloride    Assessment/Plan   Assessment & Plan     Active Hospital Problems    Diagnosis  POA   • **Acute systolic heart failure (CMS/HCC) [I50.21]  Yes   • Pneumonia of right upper lobe due to infectious organism [J18.9]  Yes   • Acute heart failure (CMS/HCC) [I50.9]  Yes   • LBBB (left bundle branch block) [I44.7]  Yes   • Abnormal TSH [R79.89]  Yes   • Anemia, unspecified [D64.9]  Yes   • Acute respiratory failure with hypoxia (CMS/HCC) [J96.01]  Yes      Resolved Hospital Problems   No resolved problems to display.        Brief Hospital Course to date:  70 yo with history of tachycardia status post AV ablation now presents with acute heart failure.  Which could be possible Takotsubo-or cardiomyopathy related to tachyarrhythmia.      This patient's hospital course, problems, and plans entered by my partner were reviewed and updated as appropriate by me on 7/13/2020       New Systolic Heart Failure (EF 20%) - unknown etiology  New LBBB   - Cardiology following, [facility] today  - possible Takosubo CM  - trop negative  - ECHO EF 20%  - improved s/p IV diuresis but currently holding due to ANA   - Coreg 6.25 mg BID, add aldactone and ACE as BP and renal function tolerated  - heart failure navigator   - Will need lifevest at discharge    ANA   - Related to diuresis; holding lasix; improved some today     Possible PNA   - Noted on CT chest -- likely fluid from  acute CHF  - was on doxy and rocephin (7/10) -- DC and monitored, no fevers, no leukocytosis     Anxiety, depression, grief  - all seems appropriate after death of her      Abnormal TSH  - in light of her heart failure will not automatically treat an elevated TSH, but consider outpatient follow-up when she is more stable.   -T4 within limits; needs repeat with PCP      DVT prophylaxis: Lovenox     Dispo: home 1-2 days pending improvement in renal fxn and results of [facility]    CODE STATUS:   Code Status and Medical Interventions:   Ordered at: 07/13/20 1239     Level Of Support Discussed With:    Patient     Code Status:    CPR     Medical Interventions (Level of Support Prior to Arrest):    Full         Electronically signed by Soraya Vallejo DO, 07/13/20, 12:52.

## 2020-07-13 NOTE — PROGRESS NOTES
Continued Stay Note  [facility]     Patient Name: Vlaeria Bobby  MRN: [id number]  Today's Date: 7/13/2020    Admit Date: 7/10/2020    Discharge Plan     Row Name 07/13/20 1133       Plan    Plan  Home    Patient/Family in Agreement with Plan  yes    Plan Comments  Patient off the unit for procedure.  Previously, patient's plan was to return home at discharge.  CM will continue to follow.  Bell Bonilla RN [phone]    Final Discharge Disposition Code  01 - home or self-care        Discharge Codes    No documentation.       Expected Discharge Date and Time     Expected Discharge Date Expected Discharge Time    Jul 16, 2020             Bell Bonilla RN

## 2020-07-14 VITALS
HEART RATE: 73 BPM | HEIGHT: 61 IN | SYSTOLIC BLOOD PRESSURE: 108 MMHG | RESPIRATION RATE: 18 BRPM | OXYGEN SATURATION: 99 % | DIASTOLIC BLOOD PRESSURE: 77 MMHG | BODY MASS INDEX: 26.66 KG/M2 | WEIGHT: 141.2 LBS | TEMPERATURE: 98 F

## 2020-07-14 PROBLEM — J96.01 ACUTE RESPIRATORY FAILURE WITH HYPOXIA: Status: RESOLVED | Noted: 2020-07-10 | Resolved: 2020-07-14

## 2020-07-14 PROBLEM — N17.9 AKI (ACUTE KIDNEY INJURY) (HCC): Status: ACTIVE | Noted: 2020-07-14

## 2020-07-14 LAB
ANION GAP SERPL CALCULATED.3IONS-SCNC: 9 MMOL/L (ref 5–15)
BUN SERPL-MCNC: 27 MG/DL (ref 8–23)
BUN/CREAT SERPL: 30 (ref 7–25)
CALCIUM SPEC-SCNC: 9.7 MG/DL (ref 8.6–10.5)
CHLORIDE SERPL-SCNC: 103 MMOL/L (ref 98–107)
CO2 SERPL-SCNC: 23 MMOL/L (ref 22–29)
CREAT SERPL-MCNC: 0.9 MG/DL (ref 0.57–1)
GFR SERPL CREATININE-BSD FRML MDRD: 62 ML/MIN/1.73
GLUCOSE SERPL-MCNC: 92 MG/DL (ref 65–99)
POTASSIUM SERPL-SCNC: 4.1 MMOL/L (ref 3.5–5.2)
SODIUM SERPL-SCNC: 135 MMOL/L (ref 136–145)

## 2020-07-14 PROCEDURE — 80048 BASIC METABOLIC PNL TOTAL CA: CPT | Performed by: INTERNAL MEDICINE

## 2020-07-14 PROCEDURE — 99239 HOSP IP/OBS DSCHRG MGMT >30: CPT | Performed by: NURSE PRACTITIONER

## 2020-07-14 PROCEDURE — 25010000002 HEPARIN (PORCINE) PER 1000 UNITS: Performed by: INTERNAL MEDICINE

## 2020-07-14 RX ORDER — FUROSEMIDE 20 MG/1
20 TABLET ORAL AS NEEDED
Qty: 30 TABLET | Refills: 11 | Status: SHIPPED | OUTPATIENT
Start: 2020-07-14

## 2020-07-14 RX ORDER — CARVEDILOL 6.25 MG/1
6.25 TABLET ORAL 2 TIMES DAILY WITH MEALS
Qty: 60 TABLET | Refills: 5 | Status: SHIPPED | OUTPATIENT
Start: 2020-07-14 | End: 2020-12-02 | Stop reason: SDUPTHER

## 2020-07-14 RX ORDER — SPIRONOLACTONE 25 MG/1
25 TABLET ORAL DAILY
Qty: 30 TABLET | Refills: 11 | Status: SHIPPED | OUTPATIENT
Start: 2020-07-14 | End: 2021-01-20 | Stop reason: SDUPTHER

## 2020-07-14 RX ORDER — POTASSIUM CHLORIDE 750 MG/1
10 TABLET, FILM COATED, EXTENDED RELEASE ORAL AS NEEDED
Qty: 30 TABLET | Refills: 11 | Status: SHIPPED | OUTPATIENT
Start: 2020-07-14

## 2020-07-14 RX ADMIN — HEPARIN SODIUM 5000 UNITS: 5000 INJECTION, SOLUTION INTRAVENOUS; SUBCUTANEOUS at 08:33

## 2020-07-14 RX ADMIN — VENLAFAXINE 75 MG: 37.5 TABLET ORAL at 08:32

## 2020-07-14 RX ADMIN — CARVEDILOL 6.25 MG: 6.25 TABLET, FILM COATED ORAL at 08:32

## 2020-07-14 RX ADMIN — CETIRIZINE HYDROCHLORIDE 5 MG: 10 TABLET, FILM COATED ORAL at 08:32

## 2020-07-14 NOTE — NURSING NOTE
Patient Name:  Valeria Bobby  :  1950  DOS:  20    Active Hospital Problems    Diagnosis   • **Acute systolic heart failure (CMS/HCC)     Added automatically from request for surgery [id number]     • Pneumonia of right upper lobe due to infectious organism   • Acute heart failure (CMS/HCC)   • LBBB (left bundle branch block)   • Abnormal TSH   • Anemia, unspecified   • Acute respiratory failure with hypoxia (CMS/HCC)       Consult has been received.  Medical records have been reviewed.  Patient is a good candidate for the [facility].  Patient to be scheduled follow up 3-5 days post discharge.    Echo Results:  · Left ventricular systolic function is severely decreased.  · Calculated EF = 20%. Estimated EF was in agreement with the calculated EF.  · The left ventricular cavity is moderately dilated.  · Left atrial cavity size is moderately dilated.  · Mild mitral valve regurgitation is present

## 2020-07-14 NOTE — PROGRESS NOTES
---------------------------------------------------------------------------------------------------------------------    Referring Provider: Bobby Peralta III, MD    Procedures Performed: Left heart catheterization.  Bilateral selective coronary angiography.  Closure the right common femoral artery with a 6 Croatian Angio-Seal device.    Indications: The patient is a 69-year-old woman with noninvasive evidence of HFrEF.  I am asked to carry out cardiac catheterization studies today to assess her coronary anatomy.    Procedure Narrative: Access was gained via the right common femoral artery using radiographic guidance.  A 6 Croatian arterial sheath was then placed in the right common femoral artery.  This sheath was then used to advance, sequentially, 6 Croatian left and right Reji coronary catheters into the aorta.  Bilateral selective multiplane coronary angiography was then carried out.  The right coronary artery was used to cross the aortic valve to measure left ventricular pressures.  It should be noted that, because of the patient's chronic renal insufficiency, left ventriculography was deferred and a DyeVert system utilized for coronary angiography.  Following the completion of the procedure and the review and acceptable vascular access site angiogram, the right common femoral artery was closed with a 6 Croatian Angio-Seal device.  There were no complications of this procedure.    Complications:  There were no signs of early complications.    Hemodynamic Findings:  · AO pressure: 120/60 mmHg  · LVpressure: 120/20-22 mmHg    Angiographic Findings:  Left Ventriculography:  The left ventricle was filmed in a single plane, 30' HAYS projection.        Selective Coronary Angiography:  · LM: This is a short angiographically normal vessel bifurcates into left anterior descending and circumflex divisions.  · LAD: This vessel is angiographically normal.  It gives rise to a single moderate size diagonal branch from its mid  "segment.  · LCX: This is a large dominant versus codominant vessel that is considered angiographically normal except for the possible presence of a short eccentric 10-15% plaque in the proximal left PDA.  · RCA: This is a small codominant versus nondominant vessel that contains minor nonobstructive plaque only.  · Antegrade flow is JUDIE-3 in all vessels and their branches.    Final Impression: #1: The coronary arteries are remarkable only for \"trivial\" nonobstructive plaque in the distal circumflex artery and a small codominant versus nondominant RCA.             "

## 2020-07-14 NOTE — DISCHARGE SUMMARY
"    [facility] Medicine Services  DISCHARGE SUMMARY    Patient Name: Valeria Bobby  : 1950  MRN: [id number]    Date of Admission: 7/10/2020  1:15 PM  Date of Discharge: 20  Primary Care Physician: Lor Pulliam APRN    Consults     Date and Time Order Name Status Description    7/10/2020 1627 Inpatient Cardiology Consult Completed           Hospital Course     Presenting Problem:   Pneumonia of right upper lobe due to infectious organism [J18.9]    Active Hospital Problems    Diagnosis  POA   • **Acute systolic heart failure (CMS/HCC) [I50.21]  Yes   • ANA (acute kidney injury) (CMS/HCC) [N17.9]  Yes   • Pneumonia of right upper lobe due to infectious organism [J18.9]  Yes   • LBBB (left bundle branch block) [I44.7]  Yes   • Abnormal TSH [R79.89]  Yes   • Anemia, unspecified [D64.9]  Yes      Resolved Hospital Problems    Diagnosis Date Resolved POA   • Acute respiratory failure with hypoxia (CMS/HCC) [J96.01] 2020 Yes          Hospital Course:  Valeria Bobby is a 69 y.o. female with history of tachycardia status post AV ablation now presents with acute heart failure.  Which could be possible Takotsubo-or cardiomyopathy related to tachyarrhythmia.     New Systolic Heart Failure (EF 20%) - unknown etiology  New LBBB   - Cardiology following, s/p [facility]  - [facility] findings:  coronary arteries are remarkable only for \"trivial\" nonobstructive plaque in the distal circumflex artery and a small codominant versus nondominant RCA  - trop negative  - improved s/p IV diuresis but currently holding due to ANA   - Coreg 6.25 mg BID, add aldactone and ACE as BP and renal function tolerated.  Dr. Peralta will consider added at follow up  - heart failure navigator following  - lifevest at discharge     ANA, resolved  - Related to diuresis; holding lasix     Possible PNA   - Noted on CT chest -- likely fluid from acute CHF  - was on doxy and rocephin (7/10) -- DC and monitored, no " fevers, no leukocytosis     Anxiety, depression, grief  - all seems appropriate after death of her      Abnormal TSH  - in light of her heart failure will not automatically treat an elevated TSH, but consider outpatient follow-up when she is more stable.  -T4 within limits; needs repeat with PCP          Discharge Follow Up Recommendations for outpatient labs/diagnostics:  PCP 1 week  Dr. Peralta, 2 weeks  Heart and valve clinic as scheduled    Day of Discharge     HPI:   No issues overnight  Anxious for lifevest placement    Review of Systems  Gen- No fevers, chills  CV- No chest pain, palpitations  Resp- No cough, dyspnea  GI- No N/V/D, abd pain    Vital Signs:   Temp:  [96.3 °F (35.7 °C)-98.1 °F (36.7 °C)] 98 °F (36.7 °C)  Heart Rate:  [70-82] 73  Resp:  [14-18] 18  BP: (104-137)/(49-89) 108/77     Physical Exam:  Constitutional: No acute distress, awake, alert, up in chair  HENT: NCAT, mucous membranes moist  Respiratory: Clear to auscultation bilaterally, respiratory effort normal   Cardiovascular: RRR, no murmurs, rubs, or gallops, palpable pedal pulses bilaterally  Gastrointestinal: Positive bowel sounds, soft, nontender, nondistended  Musculoskeletal: Trace bilateral ankle edema  Psychiatric: Appropriate affect, cooperative  Neurologic: Oriented x 3, PONCE, speech clear  Skin: No rashes      Pertinent  and/or Most Recent Results     Results from last 7 days   Lab Units 07/14/20  0539 07/13/20  0544 07/12/20  0337 07/11/20  1151 07/10/20  1353   WBC 10*3/mm3  --   --   --   --  10.20   HEMOGLOBIN g/dL  --   --   --   --  10.9*   HEMATOCRIT %  --   --   --   --  34.8   PLATELETS 10*3/mm3  --   --   --   --  461*   SODIUM mmol/L 135* 138 142 138 140   POTASSIUM mmol/L 4.1 4.1 3.8 3.8 3.8   CHLORIDE mmol/L 103 105 105 100 101   CO2 mmol/L 23.0 22.0 23.0 23.0 25.0   BUN mg/dL 27* 43* 35* 19 17   CREATININE mg/dL 0.90 1.18* 1.40* 1.19* 1.14*   GLUCOSE mg/dL 92 99 109* 108* 145*   CALCIUM mg/dL 9.7 9.5 9.4 9.9  9.8     Results from last 7 days   Lab Units 07/10/20  1353   BILIRUBIN mg/dL 0.7   ALK PHOS U/L 127*   ALT (SGPT) U/L 20   AST (SGOT) U/L 29       Results from last 7 days   Lab Units 07/10/20  1635 07/10/20  1353   TSH uIU/mL  --  7.730*   PROBNP pg/mL  --  11,999.0*   TROPONIN T ng/mL  --  <0.010   PROCALCITONIN ng/mL  --  0.10   LACTATE mmol/L 1.2  --        Brief Urine Lab Results     None          Microbiology Results Abnormal     Procedure Component Value - Date/Time    Blood Culture - Blood, Arm, Right [[id number]] Collected:  07/10/20 1636    Lab Status:  Preliminary result Specimen:  Blood from Arm, Right Updated:  07/13/20 1645     Blood Culture No growth at 3 days    Blood Culture - Blood, Arm, Left [[id number]] Collected:  07/10/20 1636    Lab Status:  Preliminary result Specimen:  Blood from Arm, Left Updated:  07/13/20 1645     Blood Culture No growth at 3 days    COVID PRE-OP / PRE-PROCEDURE SCREENING ORDER (NO ISOLATION) - Swab, Nasopharynx [[id number]] Collected:  07/10/20 1819    Lab Status:  Final result Specimen:  Swab from Nasopharynx Updated:  07/11/20 1647    Narrative:       The following orders were created for panel order COVID PRE-OP / PRE-PROCEDURE SCREENING ORDER (NO ISOLATION) - Swab, Nasopharynx.  Procedure                               Abnormality         Status                     ---------                               -----------         ------                     COVID-19,[facility], NP ...[[id number]]                      Final result                 Please view results for these tests on the individual orders.    COVID-19,[facility], NP SWAB IN LEXAR SALINE MEDIA 24-30 HR TAT - Swab, Nasopharynx [[id number]] Collected:  07/10/20 1819    Lab Status:  Final result Specimen:  Swab from Nasopharynx Updated:  07/11/20 1647     Reference Lab Report --     Comment: See scanned report          COVID19 Not Detected          Imaging Results (All)     Procedure Component Value Units Date/Time      CT Angiogram Chest [[id number]] Collected:  07/10/20 1600     Updated:  07/11/20 1636    Narrative:       EXAMINATION: CT ANGIOGRAM CHEST - 07/10/2020     INDICATION: Pleuritic chest pain, tachycardia and shortness of breath.      TECHNIQUE: CT angiogram chest with intravenous contrast following PE  protocol. 2D reconstructions performed.     The radiation dose reduction device was turned on for each scan per the  ALARA (As Low as Reasonably Achievable) protocol.     COMPARISON: NONE     FINDINGS: Thyroid is homogeneous in attenuation. Enlarged mediastinal  and subcarinal lymph nodes as well as right hilar adenopathy with a  right paratracheal 1.7 cm node in a subcarinal 3 cm node with central  airways remaining grossly patent. Esophagus in normal course and  caliber. Atherosclerotic nonaneurysmal thoracic aorta. Satisfactory  opacification of the pulmonary arterial tree on contrast bolus timing  without filling defect to suggest pulmonary embolus. Cardiac size  enlarged with four-chamber cardiomegaly however no pericardial effusion.  Scattered opacifications within the right upper lobe posteriorly and  bilateral lower lungs somewhat indeterminate but likely represent  atypical airspace disease without dominant nodule or mass. Small  bilateral pleural effusions. Degenerative changes of the thoracic spine  without aggressive osseous lesion. No soft tissue body wall findings of  concern, specifically no bulky axillary adenopathy. Visualized portions  of the upper abdomen have noted reflux of contrast into the hepatic  veins and intrahepatic IVC.       Impression:       1. No PE.  2. Four-chamber cardiomegaly with smooth intralobular septal thickening  of interstitial edema pattern with small bilateral pleural effusions.  3. Scattered opacifications greatest in the right upper lobe posteriorly  and right lower lungs somewhat ill-defined may represent areas of mild  asymmetric edema pattern versus airspace disease with  atypical etiology  not excluded. Indeterminate association for Covid 19.      DICTATED:   07/10/2020  EDITED/ls :   07/10/2020         This report was finalized on 7/11/2020 4:33 PM by Dr. Maxi Jack.               Results for orders placed during the hospital encounter of 07/10/20   Adult Transthoracic Echo Limited W/ Cont if Necessary Per Protocol    Narrative · Left ventricular systolic function is severely decreased.  · Calculated EF = 20%. Estimated EF was in agreement with the calculated   EF.  · The left ventricular cavity is moderately dilated.  · Left atrial cavity size is moderately dilated.  · Mild mitral valve regurgitation is present             Order Current Status    Blood Culture - Blood, Arm, Left Preliminary result    Blood Culture - Blood, Arm, Right Preliminary result        Discharge Details        Discharge Medications      New Medications      Instructions Start Date   carvedilol 6.25 MG tablet  Commonly known as:  COREG   6.25 mg, Oral, 2 Times Daily With Meals      furosemide 20 MG tablet  Commonly known as:  LASIX   20 mg, Oral, As Needed      potassium chloride 10 MEQ CR tablet  Commonly known as:  K-DUR   10 mEq, Oral, As Needed      spironolactone 25 MG tablet  Commonly known as:  ALDACTONE   25 mg, Oral, Daily         Continue These Medications      Instructions Start Date   cetirizine 10 MG tablet  Commonly known as:  zyrTEC   10 mg, Oral, Daily      montelukast 10 MG tablet  Commonly known as:  SINGULAIR   10 mg, Oral, Nightly      venlafaxine 75 MG tablet  Commonly known as:  EFFEXOR   75 mg, Oral, Daily      zolpidem 10 MG tablet  Commonly known as:  AMBIEN   10 mg, Oral, Nightly PRN         Stop These Medications    dilTIAZem  MG 24 hr capsule  Commonly known as:  CARDIZEM CD     meloxicam 15 MG tablet  Commonly known as:  MOBIC            Allergies   Allergen Reactions   • Codeine Nausea And Vomiting         Discharge Disposition:  Home or Self  Care    Diet:  Hospital:  Diet Order   Procedures   • Diet Regular; Cardiac       Activity:  Activity Instructions     Activity as Tolerated               CODE STATUS:    Code Status and Medical Interventions:   Ordered at: 07/13/20 1239     Level Of Support Discussed With:    Patient     Code Status:    CPR     Medical Interventions (Level of Support Prior to Arrest):    Full       Future Appointments   Date Time Provider Department Center   7/17/2020  8:15 AM Xenia Lowe APRN [facility]       Additional Instructions for the Follow-ups that You Need to Schedule     Discharge Follow-up with PCP   As directed       Currently Documented PCP:    Lor Pulliam APRN    PCP Phone Number:    [phone]     Follow Up Details:  1 week         Discharge Follow-up with Specified Provider: Heart/Valve clinic as scheduled   As directed      To:  Heart/Valve clinic as scheduled         Discharge Follow-up with Specified Provider: Kathryn; 2 Weeks   As directed      To:  Kathryn    Follow Up:  2 Weeks               Electronically signed by GINGER Berger, 07/14/20, 1:35 PM.      Time Spent on Discharge:  I spent  30  minutes on this discharge activity which included: face-to-face encounter with the patient, reviewing the data in the system, coordination of the care with the nursing staff as well as consultants, documentation, and entering orders.

## 2020-07-14 NOTE — PLAN OF CARE
Problem: Patient Care Overview  Goal: Plan of Care Review  Outcome: Ongoing (interventions implemented as appropriate)  Flowsheets  Taken 7/14/2020 0514  Progress: improving  Taken 7/13/2020 2006  Plan of Care Reviewed With: patient  Note:   SR on monitor, RA. Right groin site - soft, clean, intact. No complaints this shift. Patient awaiting life vest.

## 2020-07-14 NOTE — PROGRESS NOTES
Case Management Discharge Note      Final Note: Spoke with patient in room.  her plan is to return home.  She denies any discharge needs.  Bell Bonilla, JORGE [phone]         Destination      No service has been selected for the patient.      Durable Medical Equipment      No service has been selected for the patient.      Dialysis/Infusion      No service has been selected for the patient.      Home Medical Care      No service has been selected for the patient.      Therapy      No service has been selected for the patient.      Community Resources      No service has been selected for the patient.             Final Discharge Disposition Code: 01 - home or self-care

## 2020-07-14 NOTE — PROGRESS NOTES
Stable for discharge from cardiac standpoint once fitted with LifeVest.  Med rec completed.  Has early follow-up with heart valve clinic scheduled.  Will need to follow-up with me within 2 weeks.  ACE inhibitor/ARB/Arni will be added at follow-up, recent acute kidney injury and marginal blood pressures limiting further medication titration at this time.

## 2020-07-14 NOTE — NURSING NOTE
Referral received for Phase II Cardiac Rehab.  Staff has reviewed chart and patient does not have a qualifying diagnosis for Phase II Cardiac Rehab at this time. Heart failure must be documented as chronic systolic to qualify for Phase II CR program. Staff available if further consultation is needed.

## 2020-07-15 ENCOUNTER — READMISSION MANAGEMENT (OUTPATIENT)
Dept: CALL CENTER | Facility: HOSPITAL | Age: 70
End: 2020-07-15

## 2020-07-15 ENCOUNTER — TELEPHONE (OUTPATIENT)
Dept: CARDIOLOGY | Facility: HOSPITAL | Age: 70
End: 2020-07-15

## 2020-07-15 LAB
BACTERIA SPEC AEROBE CULT: NORMAL
BACTERIA SPEC AEROBE CULT: NORMAL

## 2020-07-15 NOTE — OUTREACH NOTE
Prep Survey      Responses   [facility] facility patient discharged from?  [facility]   Is LACE score < 7 ?  No   Eligibility  Readm Mgmt   Discharge diagnosis  a/c CHF   COVID-19 Test Status  Negative   Does the patient have one of the following disease processes/diagnoses(primary or secondary)?  CHF   Does the patient have Home health ordered?  No   Is there a DME ordered?  No   Prep survey completed?  Yes          Milagro Warner RN

## 2020-07-15 NOTE — TELEPHONE ENCOUNTER
Patient Name:  Valeria Bobby  :  1950  DOS:  07/15/20    Patient Active Problem List   Diagnosis   • Pneumonia of right upper lobe due to infectious organism   • LBBB (left bundle branch block)   • Abnormal TSH   • Anemia, unspecified   • Acute systolic heart failure (CMS/HCC)   • ANA (acute kidney injury) (CMS/[facility])         Consult received while patient was inpatient. Patient called to evaluate symptoms post discharge. Patient denies any symptoms    Education provided.   Low Na diet, Signs / symptoms, Daily weight monitoring, Role of Heart and Valve Center and when to call and EF teaching    Education time 10 min.  Patient scheduled to follow up in clinic 20

## 2020-07-16 NOTE — PROGRESS NOTES
[facility]  [facility]ve Center      07/17/2020         Valeria Bobby  [address] [place] [address]  [unfilled]    1950    Lor Pulliam APRN    Valeria Bobby is a 69 y.o. female.      Subjective:     Chief Complaint:  Congestive Heart Failure and Establish Care      HPI 69-year-old female presents the office today for ongoing evaluation of her systolic heart failure. Patient presented to [facility] 7/10/2020 and was discharged on 7/14/2020.  During hospital stay echo was performed that showed an EF of 20% with unknown etiology and a new left bundle branch block. Patient was taken to the heart cath on 7/13/2020 which showed trivial proximal ectasia and nonobstructive plaque in the small right coronary artery. Patient did undergo IV diuresis in the hospital but did suffer from acute kidney injury.  She was started on carvedilol 6.25 mg twice daily.  Aldactone, ACE, Arb, Arni to be initiated as BP and renal function allow.  Patient discharged with a LifeVest. She denies any inappropriate shocks or abnormal alarms. She reports that her weight has been stable since discharge from the hospital. Notes that she tires easily but does deny chest pain, dyspnea, dizziness, abdominal fullness or pedal edema. Notes that she was just  5 months.       Patient Active Problem List   Diagnosis   • Pneumonia of right upper lobe due to infectious organism   • LBBB (left bundle branch block)   • Abnormal TSH   • Anemia, unspecified   • Acute systolic heart failure (CMS/HCC)   • ANA (acute kidney injury) (CMS/HCC)       Past Medical History:   Diagnosis Date   • Arthritis    • CHF (congestive heart failure) (CMS/HCC)    • Insomnia    • Seasonal allergies    • Tachyarrhythmia        Past Surgical History:   Procedure Laterality Date   • BREAST BIOPSY Left    • CARDIAC ABLATION     • CARDIAC CATHETERIZATION N/A 7/13/2020    Procedure: Left Heart Cath;  Surgeon: Ace Ortega,  MD;  Location: [facility] INVASIVE LOCATION;  Service: Cardiovascular;  Laterality: N/A;   • HYSTERECTOMY  2013   • OOPHORECTOMY  2013       Family History   Problem Relation Age of Onset   • Heart attack Father    • Ovarian cancer Neg Hx    • Breast cancer Neg Hx        Social History     Socioeconomic History   • Marital status:      Spouse name: Not on file   • Number of children: Not on file   • Years of education: Not on file   • Highest education level: Not on file   Tobacco Use   • Smoking status: Never Smoker   • Smokeless tobacco: Never Used   Substance and Sexual Activity   • Alcohol use: Never     Frequency: Never   • Drug use: Never   • Sexual activity: Defer   Social History Narrative    caffeine use: drinks decaf, occassionally drinks coke cola       Allergies   Allergen Reactions   • Codeine Nausea And Vomiting         Current Outpatient Medications:   •  carvedilol (COREG) 6.25 MG tablet, Take 1 tablet by mouth 2 (Two) Times a Day With Meals., Disp: 60 tablet, Rfl: 5  •  cetirizine (zyrTEC) 10 MG tablet, Take 10 mg by mouth Daily., Disp: , Rfl:   •  furosemide (LASIX) 20 MG tablet, Take 1 tablet by mouth As Needed (edema, >3lb weight gain)., Disp: 30 tablet, Rfl: 11  •  montelukast (SINGULAIR) 10 MG tablet, Take 10 mg by mouth Every Night., Disp: , Rfl:   •  potassium chloride (K-DUR) 10 MEQ CR tablet, Take 1 tablet by mouth As Needed (take with lasix)., Disp: 30 tablet, Rfl: 11  •  spironolactone (ALDACTONE) 25 MG tablet, Take 1 tablet by mouth Daily., Disp: 30 tablet, Rfl: 11  •  venlafaxine (EFFEXOR) 75 MG tablet, Take 75 mg by mouth Daily., Disp: , Rfl:   •  zolpidem (AMBIEN) 10 MG tablet, Take 10 mg by mouth At Night As Needed for Sleep., Disp: , Rfl:     The following portions of the patient's history were reviewed today and updated as appropriate: allergies, current medications, past family history, past medical history, past social history, past surgical history and problem list  "    Review of Systems   Constitution: Positive for malaise/fatigue. Negative for chills, decreased appetite, diaphoresis, fever, night sweats, weight gain and weight loss.   HENT: Negative for congestion, hearing loss, hoarse voice and nosebleeds.    Eyes: Negative for blurred vision, visual disturbance and visual halos.   Cardiovascular: Negative for chest pain, claudication, cyanosis, dyspnea on exertion, irregular heartbeat, leg swelling, near-syncope, orthopnea, palpitations, paroxysmal nocturnal dyspnea and syncope.   Respiratory: Negative for cough, hemoptysis, shortness of breath, sleep disturbances due to breathing, snoring, sputum production and wheezing.    Hematologic/Lymphatic: Negative for bleeding problem. Does not bruise/bleed easily.   Skin: Negative for dry skin, itching and rash.   Musculoskeletal: Negative for arthritis, falls, joint pain, joint swelling and myalgias.   Gastrointestinal: Negative for bloating, abdominal pain, constipation, diarrhea, flatus, heartburn, hematemesis, hematochezia, melena, nausea and vomiting.   Genitourinary: Negative for dysuria, frequency, hematuria, nocturia and urgency.   Neurological: Negative for excessive daytime sleepiness, dizziness, headaches, light-headedness, loss of balance and weakness.   Psychiatric/Behavioral: Negative for depression. The patient does not have insomnia and is not nervous/anxious.        Objective:     Vitals:    07/17/20 0814 07/17/20 0815 07/17/20 0816   BP: 135/82 131/81 122/79   BP Location: Right arm Right arm Left arm   Patient Position: Standing Sitting Sitting   Cuff Size: Adult Adult Adult   Pulse: 82 85 83   Resp:   18   Temp:   97.7 °F (36.5 °C)   TempSrc:   Temporal   SpO2: 98% 98% 95%   Weight:   63.5 kg (140 lb)   Height:   154.9 cm (61\")       Body mass index is 26.45 kg/m².    Physical Exam   Constitutional: She is oriented to person, place, and time. She appears well-developed and well-nourished. She is active and " "cooperative. No distress.   HENT:   Head: Normocephalic and atraumatic.   Mouth/Throat: Oropharynx is clear and moist.   Eyes: Pupils are equal, round, and reactive to light. Conjunctivae and EOM are normal.   Neck: Normal range of motion. Neck supple. No JVD present. No tracheal deviation present. No thyromegaly present.   Cardiovascular: Normal rate, regular rhythm, normal heart sounds and intact distal pulses.   Pulmonary/Chest: Effort normal and breath sounds normal.   Abdominal: Soft. Bowel sounds are normal. She exhibits no distension. There is no tenderness.   Musculoskeletal: Normal range of motion.   Neurological: She is alert and oriented to person, place, and time.   Skin: Skin is warm, dry and intact.   Life vest in place    Psychiatric: She has a normal mood and affect. Her behavior is normal.   Nursing note and vitals reviewed.      Lab and Diagnostic Review:  Lab Results   Component Value Date    GLUCOSE 92 07/14/2020    CALCIUM 9.7 07/14/2020     (L) 07/14/2020    K 4.1 07/14/2020    CO2 23.0 07/14/2020     07/14/2020    BUN 27 (H) 07/14/2020    CREATININE 0.90 07/14/2020    EGFRIFNONA 62 07/14/2020    BCR 30.0 (H) 07/14/2020    ANIONGAP 9.0 07/14/2020     Lab Results   Component Value Date    WBC 10.20 07/10/2020    HGB 10.9 (L) 07/10/2020    HCT 34.8 07/10/2020    MCV 85.5 07/10/2020     (H) 07/10/2020     · Echo: Left ventricular systolic function is severely decreased.  · Calculated EF = 20%. Estimated EF was in agreement with the calculated EF.  · The left ventricular cavity is moderately dilated.  · Left atrial cavity size is moderately dilated.  · Mild mitral valve regurgitation is present      LHC:    Obstructive coronary artery disease is not present.  \"Trivial\" proximal ectasia and nonobstructive plaque is noted in the small right coronary artery.         Assessment and Plan:   1. Chronic systolic heart failure (CMS/HCC)  Euvolemic  Currently wearing LifeVest and has not " had any abnormal shocks or inappropriate alarms  Continue carvedilol and Aldactone  Will consider ACE, Arb,or  Arni initiation with visit with Dr. Peralta in 2 weeks  Labs just completed 3 days ago so will wait to draw them until follow up with Dr Peralta   Heart failure education today including signs and symptoms, the role of the heart failure center, daily weights, low sodium diet (less than 1500 mg per day), and daily physical activity. Reviewed HF Zones with patient and family.  Patient to continue current medications as previously ordered.   NYHA 2  2. ANA (acute kidney injury) (CMS/HCC)  Resolved         It has been a pleasure to participate in the care of this patient.  Patient was instructed to call the Heart and Valve Center with any questions, concerns, or worsening symptoms.    *Please note that portions of this note were completed with a voice recognition program. Efforts were made to edit the dictations, but occasionally words are mistranscribed.

## 2020-07-17 ENCOUNTER — OFFICE VISIT (OUTPATIENT)
Dept: CARDIOLOGY | Facility: HOSPITAL | Age: 70
End: 2020-07-17

## 2020-07-17 VITALS
OXYGEN SATURATION: 95 % | TEMPERATURE: 97.7 F | HEIGHT: 61 IN | WEIGHT: 140 LBS | SYSTOLIC BLOOD PRESSURE: 122 MMHG | BODY MASS INDEX: 26.43 KG/M2 | RESPIRATION RATE: 18 BRPM | DIASTOLIC BLOOD PRESSURE: 79 MMHG | HEART RATE: 83 BPM

## 2020-07-17 DIAGNOSIS — I50.22 CHRONIC SYSTOLIC HEART FAILURE (HCC): Primary | ICD-10-CM

## 2020-07-17 DIAGNOSIS — N17.9 AKI (ACUTE KIDNEY INJURY) (HCC): ICD-10-CM

## 2020-07-17 PROCEDURE — 99214 OFFICE O/P EST MOD 30 MIN: CPT | Performed by: NURSE PRACTITIONER

## 2020-07-20 ENCOUNTER — READMISSION MANAGEMENT (OUTPATIENT)
Dept: CALL CENTER | Facility: HOSPITAL | Age: 70
End: 2020-07-20

## 2020-07-20 NOTE — OUTREACH NOTE
CHF Week 1 Survey      Responses   [facility] patient discharged from?  [place]   Does the patient have one of the following disease processes/diagnoses(primary or secondary)?  CHF   CHF Week 1 attempt successful?  Yes   Call start time  0942   Call end time  0958   General alerts for this patient  LIfeVest   Discharge diagnosis  a/c CHF   Meds reviewed with patient/caregiver?  Yes   Is the patient having any side effects they believe may be caused by any medication additions or changes?  No   Does the patient have all medications ordered at discharge?  Yes   Is the patient taking all medications as directed (includes completed medication regime)?  Yes   Does the patient have a primary care provider?   Yes   Does the patient have an appointment with their PCP within 7 days of discharge?  Yes   Has the patient kept scheduled appointments due by today?  Yes   Has home health visited the patient within 72 hours of discharge?  N/A   Pulse Ox monitoring  None   Psychosocial issues?  Yes   Psychosocial comments  pt states CHF, Takotsubo, (broken heart syndrome),  result of stress from grieving for  passing away a few months ago from [place]   Comments  free of edema   Did the patient receive a copy of their discharge instructions?  Yes   Nursing interventions  Reviewed instructions with patient   What is the patient's perception of their health status since discharge?  Improving   Nursing interventions  Nurse provided patient education   Is the patient weighing daily?  Yes   Does the patient have scales?  Yes   Is the patient able to teach back Heart Failure diet management?  Yes   Is the patient able to teach back Heart Failure Zones?  Yes   Is the patient able to teach back signs and symptoms of worsening condition? (i.e. weight gain, shortness of air, etc.)  Yes   Is the patient/caregiver able to teach back the hierarchy of who to call/visit for symptoms/problems? PCP, Specialist, Home health nurse, Urgent  [facility], ED, 911  Yes   Additional teach back comments  discussed ways to reduce sodium in diet, use of pepper, herbs, salting water while cooking instead of food at table, reports compliance with wearing LifeVest    CHF Week 1 call completed?  Yes          Jenny Stringer RN

## 2020-07-28 ENCOUNTER — READMISSION MANAGEMENT (OUTPATIENT)
Dept: CALL CENTER | Facility: HOSPITAL | Age: 70
End: 2020-07-28

## 2020-07-28 NOTE — OUTREACH NOTE
CHF Week 2 Survey      Responses   [facility] patient discharged from?  [place]   Does the patient have one of the following disease processes/diagnoses(primary or secondary)?  CHF   Week 2 attempt successful?  No   Unsuccessful attempts  Attempt 1          Jenny Stringer RN

## 2020-07-30 ENCOUNTER — READMISSION MANAGEMENT (OUTPATIENT)
Dept: CALL CENTER | Facility: HOSPITAL | Age: 70
End: 2020-07-30

## 2020-07-30 NOTE — OUTREACH NOTE
"CHF Week 2 Survey      Responses   [facility] patient discharged from?  [place]   Does the patient have one of the following disease processes/diagnoses(primary or secondary)?  CHF   Week 2 attempt successful?  Yes   Call start time  1336   Call end time  1347   Discharge diagnosis  a/c CHF   Meds reviewed with patient/caregiver?  Yes   Is the patient taking all medications as directed (includes completed medication regime)?  Yes   Has the patient kept scheduled appointments due by today?  Yes   Psychosocial comments  Pt lost her  in Feb. 2020. 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    What is the patient's perception of their health status since discharge?  Improving [Pt reports, \"I'm wonderful!\" BP readings have been arount 109-111/67 something. Pt returned to work part time. ]   Is the patient weighing daily?  Yes   Is the patient able to teach back Heart Failure Zones?  Yes   If the patient is a current smoker, are they able to teach back resources for cessation?  -- [Nonsmoker]   CHF Week 2 call completed?  Yes          Pushpa Carroll RN  "

## 2020-08-10 ENCOUNTER — READMISSION MANAGEMENT (OUTPATIENT)
Dept: CALL CENTER | Facility: HOSPITAL | Age: 70
End: 2020-08-10

## 2020-08-10 NOTE — OUTREACH NOTE
CHF Week 3 Survey      Responses   [facility] patient discharged from?  [place]   Does the patient have one of the following disease processes/diagnoses(primary or secondary)?  CHF   Week 3 attempt successful?  No   Unsuccessful attempts  Attempt 1          Zarina Salvador RN

## 2020-08-12 ENCOUNTER — LAB (OUTPATIENT)
Dept: LAB | Facility: HOSPITAL | Age: 70
End: 2020-08-12

## 2020-08-12 ENCOUNTER — HOSPITAL ENCOUNTER (OUTPATIENT)
Dept: CARDIOLOGY | Facility: HOSPITAL | Age: 70
Discharge: HOME | End: 2020-08-12
Admitting: INTERNAL MEDICINE

## 2020-08-12 ENCOUNTER — OFFICE VISIT (OUTPATIENT)
Dept: CARDIOLOGY | Facility: CLINIC | Age: 70
End: 2020-08-12

## 2020-08-12 ENCOUNTER — TELEPHONE (OUTPATIENT)
Dept: CARDIOLOGY | Facility: CLINIC | Age: 70
End: 2020-08-12

## 2020-08-12 VITALS
SYSTOLIC BLOOD PRESSURE: 112 MMHG | BODY MASS INDEX: 27.49 KG/M2 | HEART RATE: 86 BPM | OXYGEN SATURATION: 94 % | DIASTOLIC BLOOD PRESSURE: 74 MMHG | WEIGHT: 145.6 LBS | TEMPERATURE: 97.8 F | HEIGHT: 61 IN

## 2020-08-12 DIAGNOSIS — I50.22 CHRONIC SYSTOLIC CONGESTIVE HEART FAILURE (HCC): ICD-10-CM

## 2020-08-12 DIAGNOSIS — I44.7 LBBB (LEFT BUNDLE BRANCH BLOCK): ICD-10-CM

## 2020-08-12 DIAGNOSIS — I50.22 CHRONIC SYSTOLIC CONGESTIVE HEART FAILURE (HCC): Primary | ICD-10-CM

## 2020-08-12 DIAGNOSIS — I50.22 CHRONIC SYSTOLIC HEART FAILURE (HCC): ICD-10-CM

## 2020-08-12 LAB
ANION GAP SERPL CALCULATED.3IONS-SCNC: 11.2 MMOL/L (ref 5–15)
BH CV ECHO MEAS - AO ROOT AREA (BSA CORRECTED): 1.6
BH CV ECHO MEAS - AO ROOT AREA: 5.4 CM^2
BH CV ECHO MEAS - AO ROOT DIAM: 2.6 CM
BH CV ECHO MEAS - BSA(HAYCOCK): 1.7 M^2
BH CV ECHO MEAS - BSA: 1.6 M^2
BH CV ECHO MEAS - BZI_BMI: 27.4 KILOGRAMS/M^2
BH CV ECHO MEAS - BZI_METRIC_HEIGHT: 154.9 CM
BH CV ECHO MEAS - BZI_METRIC_WEIGHT: 65.8 KG
BH CV ECHO MEAS - EDV(CUBED): 172.2 ML
BH CV ECHO MEAS - EDV(MOD-SP2): 128 ML
BH CV ECHO MEAS - EDV(MOD-SP4): 142 ML
BH CV ECHO MEAS - EDV(TEICH): 151.4 ML
BH CV ECHO MEAS - EF(CUBED): 32.8 %
BH CV ECHO MEAS - EF(MOD-BP): 35 %
BH CV ECHO MEAS - EF(MOD-SP2): 39.1 %
BH CV ECHO MEAS - EF(MOD-SP4): 34.5 %
BH CV ECHO MEAS - EF(TEICH): 26.4 %
BH CV ECHO MEAS - ESV(CUBED): 115.7 ML
BH CV ECHO MEAS - ESV(MOD-SP2): 78 ML
BH CV ECHO MEAS - ESV(MOD-SP4): 93 ML
BH CV ECHO MEAS - ESV(TEICH): 111.3 ML
BH CV ECHO MEAS - FS: 12.4 %
BH CV ECHO MEAS - IVS/LVPW: 0.95
BH CV ECHO MEAS - IVSD: 0.85 CM
BH CV ECHO MEAS - LA DIMENSION: 3.8 CM
BH CV ECHO MEAS - LA/AO: 1.5
BH CV ECHO MEAS - LV DIASTOLIC VOL/BSA (35-75): 86.2 ML/M^2
BH CV ECHO MEAS - LV MASS(C)D: 183.2 GRAMS
BH CV ECHO MEAS - LV MASS(C)DI: 111.2 GRAMS/M^2
BH CV ECHO MEAS - LV SYSTOLIC VOL/BSA (12-30): 56.4 ML/M^2
BH CV ECHO MEAS - LVIDD: 5.6 CM
BH CV ECHO MEAS - LVIDS: 4.9 CM
BH CV ECHO MEAS - LVLD AP2: 6.9 CM
BH CV ECHO MEAS - LVLD AP4: 7 CM
BH CV ECHO MEAS - LVLS AP2: 6.1 CM
BH CV ECHO MEAS - LVLS AP4: 6.7 CM
BH CV ECHO MEAS - LVPWD: 0.9 CM
BH CV ECHO MEAS - SI(CUBED): 34.3 ML/M^2
BH CV ECHO MEAS - SI(MOD-SP2): 30.3 ML/M^2
BH CV ECHO MEAS - SI(MOD-SP4): 29.7 ML/M^2
BH CV ECHO MEAS - SI(TEICH): 24.3 ML/M^2
BH CV ECHO MEAS - SV(CUBED): 56.5 ML
BH CV ECHO MEAS - SV(MOD-SP2): 50 ML
BH CV ECHO MEAS - SV(MOD-SP4): 49 ML
BH CV ECHO MEAS - SV(TEICH): 40 ML
BUN SERPL-MCNC: 20 MG/DL (ref 8–23)
BUN/CREAT SERPL: 17.1 (ref 7–25)
CALCIUM SPEC-SCNC: 10.1 MG/DL (ref 8.6–10.5)
CHLORIDE SERPL-SCNC: 105 MMOL/L (ref 98–107)
CO2 SERPL-SCNC: 20.8 MMOL/L (ref 22–29)
CREAT SERPL-MCNC: 1.17 MG/DL (ref 0.57–1)
GFR SERPL CREATININE-BSD FRML MDRD: 46 ML/MIN/1.73
GLUCOSE SERPL-MCNC: 92 MG/DL (ref 65–99)
MAXIMAL PREDICTED HEART RATE: 153 BPM
POTASSIUM SERPL-SCNC: 4.7 MMOL/L (ref 3.5–5.2)
SODIUM SERPL-SCNC: 137 MMOL/L (ref 136–145)
STRESS TARGET HR: 130 BPM

## 2020-08-12 PROCEDURE — 80048 BASIC METABOLIC PNL TOTAL CA: CPT

## 2020-08-12 PROCEDURE — 93308 TTE F-UP OR LMTD: CPT | Performed by: INTERNAL MEDICINE

## 2020-08-12 PROCEDURE — 99213 OFFICE O/P EST LOW 20 MIN: CPT | Performed by: INTERNAL MEDICINE

## 2020-08-12 PROCEDURE — 36415 COLL VENOUS BLD VENIPUNCTURE: CPT

## 2020-08-12 PROCEDURE — 93308 TTE F-UP OR LMTD: CPT

## 2020-08-12 RX ORDER — VALSARTAN 40 MG/1
40 TABLET ORAL NIGHTLY
Qty: 90 TABLET | Refills: 3 | Status: SHIPPED | OUTPATIENT
Start: 2020-08-12 | End: 2021-07-29

## 2020-08-12 NOTE — TELEPHONE ENCOUNTER
----- Message from Bobby Peralta III, MD sent at 8/12/2020  4:14 PM EDT -----  Your heart function is getting better but is still too low, you still need to wear your LifeVest.

## 2020-08-12 NOTE — PROGRESS NOTES
"[facility] at [facility]  Office visit  Valeria Bobby  1950    There is no work phone number on file.    VISIT DATE:  8/12/2020    PCP: Lor Pulliam, APRN  [address]  [facility] [address]    CC:  Chief Complaint   Patient presents with   • Chronic systolic heart failure     f/u       Previous cardiac studies and procedures:  July 2020  Echo  · Left ventricular systolic function is severely decreased.  · Calculated EF = 20%. Estimated EF was in agreement with the calculated EF.  · The left ventricular cavity is moderately dilated.  · Left atrial cavity size is moderately dilated.  · Mild mitral valve regurgitation is present  Cardiac catheterization: No angiographically significant coronary disease.    ASSESSMENT:   Diagnosis Plan   1. Chronic systolic congestive heart failure (CMS/HCC)  Adult Transthoracic Echo Limited W/ Cont if Necessary Per Protocol   2. Chronic systolic heart failure (CMS/HCC)     3. LBBB (left bundle branch block)         PLAN:  Continue carvedilol and spironolactone.  BMP pending today.  Adding valsartan 40 mg p.o. at bedtime, given current blood pressure I felt that she was unlikely to tolerate Entresto.  Attempting to preauthorize for limited echo today to reassess EF to see if we can discontinue her LifeVest.    Subjective  Significant improvement in baseline functional capacity.  Has gone back to working 5 hours as a .  Denies dyspnea, chest pain or palpitations.  Had mild fatigue for 2 weeks after initiating medical therapy but that is resolved.  Denies orthostasis.  Blood pressures running less than 120/80 mmHg.    PHYSICAL EXAMINATION:  Vitals:    08/12/20 1051   BP: 112/74   BP Location: Left arm   Patient Position: Sitting   Pulse: 86   Temp: 97.8 °F (36.6 °C)   SpO2: 94%   Weight: 66 kg (145 lb 9.6 oz)   Height: 154.9 cm (61\")     General Appearance:    Alert, cooperative, no distress, appears stated age   Head:    Normocephalic, without obvious " abnormality, atraumatic   Eyes:    conjunctiva/corneas clear   Nose:   Nares normal, septum midline, mucosa normal, no drainage   Throat:   Lips, teeth and gums normal   Neck:   Supple, symmetrical, trachea midline, no carotid    bruit or JVD   Lungs:     Clear to auscultation bilaterally, respirations unlabored   Chest Wall:    No tenderness or deformity    Heart:    Regular rate and rhythm, S1 and S2 normal, no murmur, rub   or gallop, normal carotid impulse bilaterally without bruit.   Abdomen:     Soft, non-tender   Extremities:   Extremities normal, atraumatic, no cyanosis or edema   Pulses:   2+ and symmetric all extremities   Skin:   Skin color, texture, turgor normal, no rashes or lesions       Diagnostic Data:  Procedures  No results found for: CHLPL, TRIG, HDL, LDLDIRECT  Lab Results   Component Value Date    GLUCOSE 92 07/14/2020    BUN 27 (H) 07/14/2020    CREATININE 0.90 07/14/2020     (L) 07/14/2020    K 4.1 07/14/2020     07/14/2020    CO2 23.0 07/14/2020     No results found for: HGBA1C  Lab Results   Component Value Date    WBC 10.20 07/10/2020    HGB 10.9 (L) 07/10/2020    HCT 34.8 07/10/2020     (H) 07/10/2020       Allergies  Allergies   Allergen Reactions   • Codeine Nausea And Vomiting       Current Medications    Current Outpatient Medications:   •  carvedilol (COREG) 6.25 MG tablet, Take 1 tablet by mouth 2 (Two) Times a Day With Meals., Disp: 60 tablet, Rfl: 5  •  cetirizine (zyrTEC) 10 MG tablet, Take 10 mg by mouth Daily., Disp: , Rfl:   •  furosemide (LASIX) 20 MG tablet, Take 1 tablet by mouth As Needed (edema, >3lb weight gain)., Disp: 30 tablet, Rfl: 11  •  montelukast (SINGULAIR) 10 MG tablet, Take 10 mg by mouth Every Night., Disp: , Rfl:   •  potassium chloride (K-DUR) 10 MEQ CR tablet, Take 1 tablet by mouth As Needed (take with lasix)., Disp: 30 tablet, Rfl: 11  •  spironolactone (ALDACTONE) 25 MG tablet, Take 1 tablet by mouth Daily., Disp: 30 tablet, Rfl:  11  •  venlafaxine (EFFEXOR) 75 MG tablet, Take 75 mg by mouth Daily., Disp: , Rfl:   •  zolpidem (AMBIEN) 10 MG tablet, Take 10 mg by mouth At Night As Needed for Sleep., Disp: , Rfl:   •  valsartan (DIOVAN) 40 MG tablet, Take 1 tablet by mouth Every Night., Disp: 90 tablet, Rfl: 3          ROS  Review of Systems   Cardiovascular: Negative for chest pain, dyspnea on exertion, irregular heartbeat, leg swelling and palpitations.   Respiratory: Negative for shortness of breath and wheezing.          SOCIAL HX  Social History     Socioeconomic History   • Marital status:      Spouse name: Not on file   • Number of children: Not on file   • Years of education: Not on file   • Highest education level: Not on file   Tobacco Use   • Smoking status: Never Smoker   • Smokeless tobacco: Never Used   Substance and Sexual Activity   • Alcohol use: Never     Frequency: Never   • Drug use: Never   • Sexual activity: Defer   Social History Narrative    caffeine use: drinks decaf, occassionally drinks coke cola       FAMILY HX  Family History   Problem Relation Age of Onset   • Heart attack Father    • Ovarian cancer Neg Hx    • Breast cancer Neg Hx              Bobby Peralta III, MD, FACC

## 2020-09-02 ENCOUNTER — OFFICE VISIT (OUTPATIENT)
Dept: CARDIOLOGY | Facility: HOSPITAL | Age: 70
End: 2020-09-02

## 2020-09-02 VITALS
HEIGHT: 61 IN | HEART RATE: 70 BPM | OXYGEN SATURATION: 98 % | TEMPERATURE: 97.9 F | DIASTOLIC BLOOD PRESSURE: 60 MMHG | SYSTOLIC BLOOD PRESSURE: 110 MMHG | RESPIRATION RATE: 18 BRPM | WEIGHT: 141.38 LBS | BODY MASS INDEX: 26.69 KG/M2

## 2020-09-02 DIAGNOSIS — N17.9 AKI (ACUTE KIDNEY INJURY) (HCC): ICD-10-CM

## 2020-09-02 DIAGNOSIS — I50.22 CHRONIC SYSTOLIC CONGESTIVE HEART FAILURE (HCC): Primary | ICD-10-CM

## 2020-09-02 PROCEDURE — 99214 OFFICE O/P EST MOD 30 MIN: CPT | Performed by: NURSE PRACTITIONER

## 2020-09-02 NOTE — PROGRESS NOTES
[facility]  [facility]      09/02/2020         Valeria Bobby  [address] [place] [address]  [unfilled]    1950    Lor Pulliam APRN    Valeria Bobby is a 69 y.o. female.      Subjective:     Chief Complaint:  Congestive Heart Failure (shf) and Follow-up       HPI 69 year old female presents to the office today for ongoing evaluation of her chronic systolic heart failure.  She reports she is doing well from a cardiac standpoint.  She has not wearing her LifeVest today as she notes she forgot it in her haste of getting ready for today's appointment.  She does report she has been wearing it regularly.  At her last office visit with Dr. Peralta she was started on valsartan.  She reports she felt poorly for the first few days after taking the valsartan which included fatigue, hypotension and dyspnea.  She reports that she cut her valsartan in half and is now only taking 20 mg daily at bedtime.  She reports she feels significantly better and is back to her normal energy level.  Reports blood pressure is now running 1 teens over 60s and it previously had been.  Denies chest pain, dyspnea, palpitations, presyncope, syncope, orthopnea, PND or pedal edema.      Patient Active Problem List   Diagnosis   • Pneumonia of right upper lobe due to infectious organism   • LBBB (left bundle branch block)   • Abnormal TSH   • Anemia, unspecified   • Chronic systolic heart failure (CMS/HCC)   • ANA (acute kidney injury) (CMS/HCC)       Past Medical History:   Diagnosis Date   • Arthritis    • CHF (congestive heart failure) (CMS/HCC)    • Insomnia    • Seasonal allergies    • Tachyarrhythmia        Past Surgical History:   Procedure Laterality Date   • BREAST BIOPSY Left    • CARDIAC ABLATION     • CARDIAC CATHETERIZATION N/A 7/13/2020    Procedure: Left Heart Cath;  Surgeon: Ace Ortega MD;  Location: [facility] CATH INVASIVE LOCATION;  Service: Cardiovascular;  Laterality: N/A;   •  HYSTERECTOMY  2013   • OOPHORECTOMY  2013       Family History   Problem Relation Age of Onset   • Heart attack Father    • Ovarian cancer Neg Hx    • Breast cancer Neg Hx        Social History     Socioeconomic History   • Marital status:      Spouse name: Not on file   • Number of children: Not on file   • Years of education: Not on file   • Highest education level: Not on file   Tobacco Use   • Smoking status: Never Smoker   • Smokeless tobacco: Never Used   Substance and Sexual Activity   • Alcohol use: Never     Frequency: Never   • Drug use: Never   • Sexual activity: Defer   Social History Narrative    caffeine use: drinks decaf, occassionally drinks coke cola       Allergies   Allergen Reactions   • Codeine Nausea And Vomiting         Current Outpatient Medications:   •  carvedilol (COREG) 6.25 MG tablet, Take 1 tablet by mouth 2 (Two) Times a Day With Meals., Disp: 60 tablet, Rfl: 5  •  cetirizine (zyrTEC) 10 MG tablet, Take 10 mg by mouth Daily., Disp: , Rfl:   •  furosemide (LASIX) 20 MG tablet, Take 1 tablet by mouth As Needed (edema, >3lb weight gain)., Disp: 30 tablet, Rfl: 11  •  montelukast (SINGULAIR) 10 MG tablet, Take 10 mg by mouth Every Night., Disp: , Rfl:   •  potassium chloride (K-DUR) 10 MEQ CR tablet, Take 1 tablet by mouth As Needed (take with lasix)., Disp: 30 tablet, Rfl: 11  •  spironolactone (ALDACTONE) 25 MG tablet, Take 1 tablet by mouth Daily., Disp: 30 tablet, Rfl: 11  •  valsartan (DIOVAN) 40 MG tablet, Take 1 tablet by mouth Every Night. (Patient taking differently: Take 20 mg by mouth Every Night.), Disp: 90 tablet, Rfl: 3  •  venlafaxine (EFFEXOR) 75 MG tablet, Take 75 mg by mouth Daily., Disp: , Rfl:   •  zolpidem (AMBIEN) 10 MG tablet, Take 10 mg by mouth At Night As Needed for Sleep., Disp: , Rfl:     The following portions of the patient's history were reviewed today and updated as appropriate: allergies, current medications, past family history, past medical  "history, past social history, past surgical history and problem list     Review of Systems   Constitution: Negative for chills, decreased appetite, diaphoresis, fever, malaise/fatigue, night sweats, weight gain and weight loss.   HENT: Negative for congestion, hearing loss, hoarse voice and nosebleeds.    Eyes: Negative for blurred vision, visual disturbance and visual halos.   Cardiovascular: Negative for chest pain, claudication, cyanosis, dyspnea on exertion, irregular heartbeat, leg swelling, near-syncope, orthopnea, palpitations, paroxysmal nocturnal dyspnea and syncope.   Respiratory: Negative for cough, hemoptysis, shortness of breath, sleep disturbances due to breathing, snoring, sputum production and wheezing.    Hematologic/Lymphatic: Negative for bleeding problem. Does not bruise/bleed easily.   Skin: Negative for dry skin, itching and rash.   Musculoskeletal: Negative for arthritis, falls, joint pain, joint swelling and myalgias.   Gastrointestinal: Negative for bloating, abdominal pain, constipation, diarrhea, flatus, heartburn, hematemesis, hematochezia, melena, nausea and vomiting.   Genitourinary: Negative for dysuria, frequency, hematuria, nocturia and urgency.   Neurological: Negative for excessive daytime sleepiness, dizziness, headaches, light-headedness, loss of balance and weakness.   Psychiatric/Behavioral: Negative for depression. The patient does not have insomnia and is not nervous/anxious.        Objective:     Vitals:    09/02/20 0926   BP: 110/60   BP Location: Left arm   Patient Position: Sitting   Cuff Size: Adult   Pulse: 70   Resp: 18   Temp: 97.9 °F (36.6 °C)   TempSrc: Temporal   SpO2: 98%   Weight: 64.1 kg (141 lb 6 oz)   Height: 154.9 cm (61\")       Body mass index is 26.71 kg/m².    Physical Exam   Constitutional: She is oriented to person, place, and time. She appears well-developed and well-nourished. She is active and cooperative. No distress.   HENT:   Head: Normocephalic " and atraumatic.   Mouth/Throat: Oropharynx is clear and moist.   Eyes: Pupils are equal, round, and reactive to light. Conjunctivae and EOM are normal.   Neck: Normal range of motion. Neck supple. No JVD present. No tracheal deviation present. No thyromegaly present.   Cardiovascular: Normal rate, regular rhythm, normal heart sounds and intact distal pulses.   Pulmonary/Chest: Effort normal and breath sounds normal.   Abdominal: Soft. Bowel sounds are normal. She exhibits no distension. There is no tenderness.   Musculoskeletal: Normal range of motion.   Neurological: She is alert and oriented to person, place, and time.   Skin: Skin is warm, dry and intact.   Psychiatric: She has a normal mood and affect. Her behavior is normal.   Nursing note and vitals reviewed.      Lab and Diagnostic Review:  Echo: 8/12/2020:  · Left ventricular systolic function is severely decreased.  · Estimated EF appears to be in the range of 26 - 30%.           Assessment and Plan:   1. Chronic systolic congestive heart failure (CMS/HCC)  Euvolemic  ejection fraction still significantly depressed and encourage patient to resume wearing LifeVest regularly  Continue carvedilol, Lasix  Increase valsartan back to 40 mg nightly watch blood pressures closely  Heart failure education today including signs and symptoms, the role of the heart failure center, daily weights, low sodium diet (less than 1500 mg per day), and daily physical activity. Reviewed HF Zones with patient and family.  Patient to continue current medications as previously ordered.   2. ANA (acute kidney injury) (CMS/HCC)  Resolved    Encouraged daily physical activity    F/u 3 months       It has been a pleasure to participate in the care of this patient.  Patient was instructed to call the [facility] with any questions, concerns, or worsening symptoms.    *Please note that portions of this note were completed with a voice recognition program. Efforts were made to  edit the dictations, but occasionally words are mistranscribed.

## 2020-09-30 ENCOUNTER — OFFICE VISIT (OUTPATIENT)
Dept: CARDIOLOGY | Facility: CLINIC | Age: 70
End: 2020-09-30

## 2020-09-30 VITALS
HEIGHT: 61 IN | DIASTOLIC BLOOD PRESSURE: 64 MMHG | SYSTOLIC BLOOD PRESSURE: 120 MMHG | TEMPERATURE: 96.4 F | HEART RATE: 66 BPM | OXYGEN SATURATION: 99 % | BODY MASS INDEX: 26.43 KG/M2 | WEIGHT: 140 LBS

## 2020-09-30 DIAGNOSIS — I50.22 CHRONIC SYSTOLIC HEART FAILURE (HCC): Primary | ICD-10-CM

## 2020-09-30 DIAGNOSIS — I44.7 LBBB (LEFT BUNDLE BRANCH BLOCK): ICD-10-CM

## 2020-09-30 PROCEDURE — 99213 OFFICE O/P EST LOW 20 MIN: CPT | Performed by: INTERNAL MEDICINE

## 2020-09-30 NOTE — PROGRESS NOTES
"[facility] at [facility]  Office visit  Valeria Bobby  1950    There is no work phone number on file.    VISIT DATE:  9/30/2020    PCP: Lor Pulliam, APRN  [address]  [facility] [address]    CC:  Chief Complaint   Patient presents with   • LBBB       Previous cardiac studies and procedures:  July 2020  Echo  · Left ventricular systolic function is severely decreased.  · Calculated EF = 20%. Estimated EF was in agreement with the calculated EF.  · The left ventricular cavity is moderately dilated.  · Left atrial cavity size is moderately dilated.  · Mild mitral valve regurgitation is present  Cardiac catheterization: No angiographically significant coronary disease.    ASSESSMENT:   Diagnosis Plan   1. Chronic systolic heart failure (CMS/HCC)     2. LBBB (left bundle branch block)         PLAN:  Continue carvedilol and spironolactone, valsartan 40 mg p.o. at bedtime, given current blood pressure I felt that she was unlikely to tolerate Entresto.  Limited echo pending next month, 90 days in the medical therapy.  Episodes of fatigue and intermittent lightheadedness limiting further medication titration.    Subjective  Stable functional capacity.  Working part-time.  Denies dyspnea, chest pain or palpitations.  Still reports mild fatigue and falling asleep easily with such activities as driving a.  Today.  Intermittent mild orthostasis..  Denies orthostasis.  Blood pressures running less than 120/80 mmHg, systolic blood pressures often running less than 110 mmHg.  She is very compliant with medical therapy.    PHYSICAL EXAMINATION:  Vitals:    09/30/20 1013   BP: 120/64   BP Location: Right arm   Patient Position: Sitting   Pulse: 66   Temp: 96.4 °F (35.8 °C)   SpO2: 99%   Weight: 63.5 kg (140 lb)   Height: 154.9 cm (61\")     General Appearance:    Alert, cooperative, no distress, appears stated age   Head:    Normocephalic, without obvious abnormality, atraumatic   Eyes:    " conjunctiva/corneas clear   Nose:   Nares normal, septum midline, mucosa normal, no drainage   Throat:   Lips, teeth and gums normal   Neck:   Supple, symmetrical, trachea midline, no carotid    bruit or JVD   Lungs:     Clear to auscultation bilaterally, respirations unlabored   Chest Wall:    No tenderness or deformity    Heart:    Regular rate and rhythm, S1 and S2 normal, no murmur, rub   or gallop, normal carotid impulse bilaterally without bruit.   Abdomen:     Soft, non-tender   Extremities:   Extremities normal, atraumatic, no cyanosis or edema   Pulses:   2+ and symmetric all extremities   Skin:   Skin color, texture, turgor normal, no rashes or lesions       Diagnostic Data:  Procedures  No results found for: CHLPL, TRIG, HDL, LDLDIRECT  Lab Results   Component Value Date    GLUCOSE 92 08/12/2020    BUN 20 08/12/2020    CREATININE 1.17 (H) 08/12/2020     08/12/2020    K 4.7 08/12/2020     08/12/2020    CO2 20.8 (L) 08/12/2020     No results found for: HGBA1C  Lab Results   Component Value Date    WBC 10.20 07/10/2020    HGB 10.9 (L) 07/10/2020    HCT 34.8 07/10/2020     (H) 07/10/2020       Allergies  Allergies   Allergen Reactions   • Codeine Nausea And Vomiting       Current Medications    Current Outpatient Medications:   •  carvedilol (COREG) 6.25 MG tablet, Take 1 tablet by mouth 2 (Two) Times a Day With Meals., Disp: 60 tablet, Rfl: 5  •  cetirizine (zyrTEC) 10 MG tablet, Take 10 mg by mouth Daily., Disp: , Rfl:   •  furosemide (LASIX) 20 MG tablet, Take 1 tablet by mouth As Needed (edema, >3lb weight gain)., Disp: 30 tablet, Rfl: 11  •  montelukast (SINGULAIR) 10 MG tablet, Take 10 mg by mouth Every Night., Disp: , Rfl:   •  potassium chloride (K-DUR) 10 MEQ CR tablet, Take 1 tablet by mouth As Needed (take with lasix)., Disp: 30 tablet, Rfl: 11  •  spironolactone (ALDACTONE) 25 MG tablet, Take 1 tablet by mouth Daily., Disp: 30 tablet, Rfl: 11  •  valsartan (DIOVAN) 40 MG tablet,  Take 1 tablet by mouth Every Night. (Patient taking differently: Take 20 mg by mouth Every Night.), Disp: 90 tablet, Rfl: 3  •  venlafaxine (EFFEXOR) 75 MG tablet, Take 75 mg by mouth Daily., Disp: , Rfl:   •  zolpidem (AMBIEN) 10 MG tablet, Take 10 mg by mouth At Night As Needed for Sleep., Disp: , Rfl:           ROS  Review of Systems   Cardiovascular: Negative for chest pain, dyspnea on exertion, irregular heartbeat, leg swelling and palpitations.   Respiratory: Negative for shortness of breath and wheezing.          SOCIAL HX  Social History     Socioeconomic History   • Marital status:      Spouse name: Not on file   • Number of children: Not on file   • Years of education: Not on file   • Highest education level: Not on file   Tobacco Use   • Smoking status: Never Smoker   • Smokeless tobacco: Never Used   Substance and Sexual Activity   • Alcohol use: Never     Frequency: Never   • Drug use: Never   • Sexual activity: Defer   Social History Narrative    caffeine use: drinks decaf, occassionally drinks coke cola       FAMILY HX  Family History   Problem Relation Age of Onset   • Heart attack Father    • Ovarian cancer Neg Hx    • Breast cancer Neg Hx              Bobby Peralta III, MD, FACC

## 2020-10-15 ENCOUNTER — HOSPITAL ENCOUNTER (OUTPATIENT)
Dept: CARDIOLOGY | Facility: HOSPITAL | Age: 70
Discharge: HOME | End: 2020-10-15
Admitting: INTERNAL MEDICINE

## 2020-10-15 ENCOUNTER — TELEPHONE (OUTPATIENT)
Dept: CARDIOLOGY | Facility: CLINIC | Age: 70
End: 2020-10-15

## 2020-10-15 VITALS — WEIGHT: 140 LBS | BODY MASS INDEX: 26.43 KG/M2 | HEIGHT: 61 IN

## 2020-10-15 DIAGNOSIS — I50.22 CHRONIC SYSTOLIC HEART FAILURE (HCC): Primary | ICD-10-CM

## 2020-10-15 DIAGNOSIS — I50.22 CHRONIC SYSTOLIC HEART FAILURE (HCC): ICD-10-CM

## 2020-10-15 LAB
BH CV ECHO MEAS - BSA(HAYCOCK): 1.7 M^2
BH CV ECHO MEAS - BSA: 1.6 M^2
BH CV ECHO MEAS - BZI_BMI: 26.5 KILOGRAMS/M^2
BH CV ECHO MEAS - BZI_METRIC_HEIGHT: 154.9 CM
BH CV ECHO MEAS - BZI_METRIC_WEIGHT: 63.5 KG
BH CV ECHO MEAS - EDV(CUBED): 144.2 ML
BH CV ECHO MEAS - EDV(MOD-SP2): 204 ML
BH CV ECHO MEAS - EDV(MOD-SP4): 161 ML
BH CV ECHO MEAS - EDV(TEICH): 132 ML
BH CV ECHO MEAS - EF(CUBED): 13.5 %
BH CV ECHO MEAS - EF(MOD-BP): 29 %
BH CV ECHO MEAS - EF(MOD-SP2): 33.8 %
BH CV ECHO MEAS - EF(MOD-SP4): 28.6 %
BH CV ECHO MEAS - EF(TEICH): 10.6 %
BH CV ECHO MEAS - ESV(CUBED): 124.7 ML
BH CV ECHO MEAS - ESV(MOD-SP2): 135 ML
BH CV ECHO MEAS - ESV(MOD-SP4): 115 ML
BH CV ECHO MEAS - ESV(TEICH): 118 ML
BH CV ECHO MEAS - FS: 4.7 %
BH CV ECHO MEAS - IVS/LVPW: 0.98
BH CV ECHO MEAS - IVSD: 0.74 CM
BH CV ECHO MEAS - LA DIMENSION: 4.6 CM
BH CV ECHO MEAS - LAD MAJOR: 5.4 CM
BH CV ECHO MEAS - LV DIASTOLIC VOL/BSA (35-75): 99.2 ML/M^2
BH CV ECHO MEAS - LV MASS(C)D: 134.4 GRAMS
BH CV ECHO MEAS - LV MASS(C)DI: 82.8 GRAMS/M^2
BH CV ECHO MEAS - LV SYSTOLIC VOL/BSA (12-30): 70.8 ML/M^2
BH CV ECHO MEAS - LVIDD: 5.7 CM
BH CV ECHO MEAS - LVIDS: 5 CM
BH CV ECHO MEAS - LVLD AP2: 8 CM
BH CV ECHO MEAS - LVLD AP4: 7.8 CM
BH CV ECHO MEAS - LVLS AP2: 7.7 CM
BH CV ECHO MEAS - LVLS AP4: 7 CM
BH CV ECHO MEAS - LVPWD: 0.75 CM
BH CV ECHO MEAS - SI(CUBED): 12 ML/M^2
BH CV ECHO MEAS - SI(MOD-SP2): 42.5 ML/M^2
BH CV ECHO MEAS - SI(MOD-SP4): 28.3 ML/M^2
BH CV ECHO MEAS - SI(TEICH): 8.6 ML/M^2
BH CV ECHO MEAS - SV(CUBED): 19.5 ML
BH CV ECHO MEAS - SV(MOD-SP2): 69 ML
BH CV ECHO MEAS - SV(MOD-SP4): 46 ML
BH CV ECHO MEAS - SV(TEICH): 14 ML

## 2020-10-15 PROCEDURE — 93308 TTE F-UP OR LMTD: CPT

## 2020-10-15 PROCEDURE — 93308 TTE F-UP OR LMTD: CPT | Performed by: INTERNAL MEDICINE

## 2020-10-15 NOTE — TELEPHONE ENCOUNTER
----- Message from Bobby Peralta III, MD sent at 10/15/2020  2:05 PM EDT -----  Heart function remains low.  Needs follow-up with EP to consider ICD.

## 2020-10-21 ENCOUNTER — CONSULT (OUTPATIENT)
Dept: CARDIOLOGY | Facility: CLINIC | Age: 70
End: 2020-10-21

## 2020-10-21 VITALS
TEMPERATURE: 98 F | BODY MASS INDEX: 27.38 KG/M2 | SYSTOLIC BLOOD PRESSURE: 128 MMHG | HEART RATE: 61 BPM | DIASTOLIC BLOOD PRESSURE: 66 MMHG | OXYGEN SATURATION: 99 % | HEIGHT: 61 IN | WEIGHT: 145 LBS

## 2020-10-21 DIAGNOSIS — I42.0 DCM (DILATED CARDIOMYOPATHY) (HCC): ICD-10-CM

## 2020-10-21 DIAGNOSIS — I44.7 LBBB (LEFT BUNDLE BRANCH BLOCK): ICD-10-CM

## 2020-10-21 DIAGNOSIS — I50.22 CHRONIC SYSTOLIC HEART FAILURE (HCC): Primary | ICD-10-CM

## 2020-10-21 PROCEDURE — 99204 OFFICE O/P NEW MOD 45 MIN: CPT | Performed by: INTERNAL MEDICINE

## 2020-10-21 NOTE — PROGRESS NOTES
Electrophysiology Clinic Consult     Valeria Bobby  1950    10/21/20    DATE OF ADMISSION: (Not on file)  [facility] CARDIOLOGY    Lor Pulliam, APRN  [address] / [facility] [address]    Chief Complaint   Patient presents with   • Chronic systolic heart failure (CMS/HCC)     Problem List:   1)Chronic Systolic Heart Failure; NICM      A) Echo 7/11/2020 Left ventricular systolic function is severely decreased.EF 20%. moderately dilated LV .  moderately dilated LA Mild MR     B) Echo 10/15/2020: EF 29%, moderately decreased LV function.  mild LV dilation,      C)7/13/20 - LHC : LM normal, LAD normal, LCX no obstructive disease, RCA no obstructive disease   2) LBBB  3)Anemia   4)Chronic Kidney Disease     History of Present Illness:  Ms Bobby presents today for evaluation for chronic systolic heart failure and possible ICD placement.  In February she lost her  to cancer-  she was trying to do more yard work and activity around the house - she noted increasing shortness of breath and  weakness. Over the summer her symptoms became severe.  She had profound shortness of breath, increased swelling in her belly. No CP, presyncope or true syncope.  This prompted a CHF admission on July 11th 2020. She had an echo showing a depressed ejection fraction of 20% , Her heart cath was unremarkable she was discharged to home on appropriate medical therapy. She has been compliant with metoprolol, valsartan, aldactone. She had a follow up echo in October and her EF was still 29%. She continues with SOB, JARVIS, poor exertional tolerance. If she misses her lasix her legs swell.  She has been wearing a life vest at home and at work but does not have it on in the office today.     She has never smoked. She does not drink ETOH.     She saw Dr Piper about 15 years ago for an ablation of what sounds to be an SVT.       Allergies   Allergen Reactions   • Codeine Nausea And Vomiting          Current Outpatient Medications:   •  carvedilol (COREG) 6.25 MG tablet, Take 1 tablet by mouth 2 (Two) Times a Day With Meals., Disp: 60 tablet, Rfl: 5  •  cetirizine (zyrTEC) 10 MG tablet, Take 10 mg by mouth As Needed., Disp: , Rfl:   •  furosemide (LASIX) 20 MG tablet, Take 1 tablet by mouth As Needed (edema, >3lb weight gain)., Disp: 30 tablet, Rfl: 11  •  montelukast (SINGULAIR) 10 MG tablet, Take 10 mg by mouth Every Night., Disp: , Rfl:   •  potassium chloride (K-DUR) 10 MEQ CR tablet, Take 1 tablet by mouth As Needed (take with lasix)., Disp: 30 tablet, Rfl: 11  •  spironolactone (ALDACTONE) 25 MG tablet, Take 1 tablet by mouth Daily., Disp: 30 tablet, Rfl: 11  •  valsartan (DIOVAN) 40 MG tablet, Take 1 tablet by mouth Every Night., Disp: 90 tablet, Rfl: 3    Social History     Socioeconomic History   • Marital status:      Spouse name: Not on file   • Number of children: Not on file   • Years of education: Not on file   • Highest education level: Not on file   Tobacco Use   • Smoking status: Never Smoker   • Smokeless tobacco: Never Used   Substance and Sexual Activity   • Alcohol use: Never     Frequency: Never   • Drug use: Never   • Sexual activity: Defer   Social History Narrative    caffeine use: drinks decaf, occassionally drinks coke cola       Family History   Problem Relation Age of Onset   • Heart attack Father    • Ovarian cancer Neg Hx    • Breast cancer Neg Hx        REVIEW OF SYSTEMS:   CONST:  No weight loss, fever, chills, weakness or + fatigue.   HEENT:  No visual loss, blurred vision, double vision, yellow sclerae.                   No hearing loss, congestion, sore throat.   SKIN:      No rashes, urticaria, ulcers, sores.     RESP:     + shortness of breath, No hemoptysis, cough, sputum.   GI:           No anorexia, nausea, vomiting, diarrhea. No abdominal pain, melena.   :         No burning on urination, hematuria or increased frequency.  ENDO:    No diaphoresis, cold  "or heat intolerance. No polyuria or polydipsia.   NEURO:  No headache, dizziness, syncope, paralysis, ataxia, or parasthesias.                  No change in bowel or bladder control. No history of CVA/TIA  MUSC:    No muscle, back pain, joint pain or stiffness.   HEME:    No anemia, bleeding, bruising. No history of DVT/PE.  PSYCH:  No history of + depression    Vitals:    10/21/20 1051   BP: 128/66   BP Location: Left arm   Patient Position: Sitting   Cuff Size: Adult   Pulse: 61   Temp: 98 °F (36.7 °C)   SpO2: 99%   Weight: 65.8 kg (145 lb)   Height: 154.9 cm (61\")                 Physical Exam:  GEN: Well nourished, well-developed, no acute distress  HEENT: Normocephalic, atraumatic, PERRLA, moist mucous membranes  NECK: Supple, NO JVD, no thyromegaly, no lymphadenopathy  CARD: S1S2, RRR, S3 S4 no murmurs, PMI NL  LUNGS: Clear to auscultation, normal respiratory effort  ABDOMEN: Soft, nontender, normal bowel sounds  EXTREMITIES: No gross deformities, no clubbing, cyanosis, or edema  SKIN: Warm, dry, no lesions  NEURO: No focal deficits, alert and oriented x 3  PSYCHIATRIC: Normal affect and mood      I personally viewed and interpreted the patient's EKG/Telemetry/lab data    Lab Results   Component Value Date    GLUCOSE 92 08/12/2020    CALCIUM 10.1 08/12/2020     08/12/2020    K 4.7 08/12/2020    CO2 20.8 (L) 08/12/2020     08/12/2020    BUN 20 08/12/2020    CREATININE 1.17 (H) 08/12/2020    EGFRIFNONA 46 (L) 08/12/2020    BCR 17.1 08/12/2020    ANIONGAP 11.2 08/12/2020     Lab Results   Component Value Date    WBC 10.20 07/10/2020    HGB 10.9 (L) 07/10/2020    HCT 34.8 07/10/2020    MCV 85.5 07/10/2020     (H) 07/10/2020     No results found for: INR, PROTIME  Lab Results   Component Value Date    TSH 7.730 (H) 07/10/2020       EKG: From 7/10/20 NSR with LBBB Rate 103  QRS 150ms       ICD-10-CM ICD-9-CM   1. Chronic systolic heart failure (CMS/HCC)  I50.22 428.22   2. LBBB (left bundle branch " block)  I44.7 426.3   3. DCM (dilated cardiomyopathy) (CMS/HCC)  I42.0 425.4       Assessment and Plan:   1) Chronic Systolic Heart Failure: NICM. Class III CHF Pt has had a chronically suppressed EF over a period of >3 months despite guideline directed medical therapy. Meds limited by CRI.  Given her wide QRS she could benefit from a Biv -ICD. The procedure and is risks, benefits, and alternatives have been reviewed. She wishes to proceed.   2) LBBB- QRS 150ms   3)Chronic Kidney Disease- Cr 8/2020 1.17 - caution titrating medical therapy.       Scribed for Jeremiah Biswas MD by GINGER Godinez. 10/21/2020  11:34 EDT     I, Jeremiah Biswas MD, personally performed the services described in this documentation as scribed by the above named individual in my presence, and it is both accurate and complete.  10/21/2020  11:34 EDT

## 2020-10-27 PROBLEM — I42.0 DCM (DILATED CARDIOMYOPATHY) (HCC): Status: ACTIVE | Noted: 2020-10-27

## 2020-11-12 ENCOUNTER — TELEPHONE (OUTPATIENT)
Dept: OTHER | Facility: OTHER | Age: 70
End: 2020-11-12

## 2020-11-23 ENCOUNTER — TELEPHONE (OUTPATIENT)
Dept: CARDIOLOGY | Facility: CLINIC | Age: 70
End: 2020-11-23

## 2020-11-23 DIAGNOSIS — I50.22 CHRONIC SYSTOLIC HEART FAILURE (HCC): Primary | ICD-10-CM

## 2020-11-23 NOTE — TELEPHONE ENCOUNTER
PT states that she has had two events that she is very disoriented and even stumbles. She feels like she is going to pass out but doesn't. She says it feels like she has been hit by a shruthi truck but has no pain.Lasted about 10min. She wanted to know if this is cardiac related. I ask if she was wearing her lifevest and she said she returned it 1 month ago. Her ICD implant is scheduled for 12/11/20. Her EF is 29%. I advised for her to go to ER if this happens again since we can not do any strips from the lifevest.

## 2020-12-02 ENCOUNTER — PREP FOR SURGERY (OUTPATIENT)
Dept: OTHER | Facility: HOSPITAL | Age: 70
End: 2020-12-02

## 2020-12-02 DIAGNOSIS — I42.0 DCM (DILATED CARDIOMYOPATHY) (HCC): Primary | ICD-10-CM

## 2020-12-02 RX ORDER — SODIUM CHLORIDE 9 MG/ML
1 INJECTION, SOLUTION INTRAVENOUS CONTINUOUS
Status: CANCELLED | OUTPATIENT
Start: 2020-12-02 | End: 2020-12-02

## 2020-12-02 RX ORDER — CEFAZOLIN SODIUM 2 G/100ML
2 INJECTION, SOLUTION INTRAVENOUS ONCE
Status: CANCELLED | OUTPATIENT
Start: 2020-12-02 | End: 2020-12-02

## 2020-12-02 RX ORDER — SODIUM CHLORIDE 0.9 % (FLUSH) 0.9 %
10 SYRINGE (ML) INJECTION AS NEEDED
Status: CANCELLED | OUTPATIENT
Start: 2020-12-02

## 2020-12-02 RX ORDER — NITROGLYCERIN 0.4 MG/1
0.4 TABLET SUBLINGUAL
Status: CANCELLED | OUTPATIENT
Start: 2020-12-02

## 2020-12-02 RX ORDER — ACETAMINOPHEN 325 MG/1
650 TABLET ORAL EVERY 4 HOURS PRN
Status: CANCELLED | OUTPATIENT
Start: 2020-12-02

## 2020-12-02 RX ORDER — SODIUM CHLORIDE 0.9 % (FLUSH) 0.9 %
3 SYRINGE (ML) INJECTION EVERY 12 HOURS SCHEDULED
Status: CANCELLED | OUTPATIENT
Start: 2020-12-02

## 2020-12-02 RX ORDER — CARVEDILOL 6.25 MG/1
6.25 TABLET ORAL 2 TIMES DAILY WITH MEALS
Qty: 180 TABLET | Refills: 1 | Status: SHIPPED | OUTPATIENT
Start: 2020-12-02 | End: 2021-01-20

## 2020-12-07 ENCOUNTER — TELEPHONE (OUTPATIENT)
Dept: OTHER | Facility: OTHER | Age: 70
End: 2020-12-07

## 2020-12-07 NOTE — TELEPHONE ENCOUNTER
Pt having eye issues and declines Wing trial due to transportation issues with MRI/24 hour holter.

## 2020-12-11 ENCOUNTER — HOSPITAL ENCOUNTER (OUTPATIENT)
Facility: HOSPITAL | Age: 70
Discharge: HOME | End: 2020-12-12
Attending: INTERNAL MEDICINE | Admitting: INTERNAL MEDICINE

## 2020-12-11 DIAGNOSIS — I50.22 CHRONIC SYSTOLIC HEART FAILURE (HCC): ICD-10-CM

## 2020-12-11 DIAGNOSIS — I42.0 DCM (DILATED CARDIOMYOPATHY) (HCC): ICD-10-CM

## 2020-12-11 DIAGNOSIS — I44.7 LBBB (LEFT BUNDLE BRANCH BLOCK): ICD-10-CM

## 2020-12-11 LAB
ANION GAP SERPL CALCULATED.3IONS-SCNC: 10 MMOL/L (ref 5–15)
BUN SERPL-MCNC: 26 MG/DL (ref 8–23)
BUN/CREAT SERPL: 25.5 (ref 7–25)
CALCIUM SPEC-SCNC: 10.3 MG/DL (ref 8.6–10.5)
CHLORIDE SERPL-SCNC: 104 MMOL/L (ref 98–107)
CO2 SERPL-SCNC: 24 MMOL/L (ref 22–29)
CREAT SERPL-MCNC: 1.02 MG/DL (ref 0.57–1)
DEPRECATED RDW RBC AUTO: 61 FL (ref 37–54)
ERYTHROCYTE [DISTWIDTH] IN BLOOD BY AUTOMATED COUNT: 19.5 % (ref 12.3–15.4)
GFR SERPL CREATININE-BSD FRML MDRD: 54 ML/MIN/1.73
GLUCOSE SERPL-MCNC: 92 MG/DL (ref 65–99)
HBA1C MFR BLD: 5.4 % (ref 4.8–5.6)
HCT VFR BLD AUTO: 34.7 % (ref 34–46.6)
HGB BLD-MCNC: 10.8 G/DL (ref 12–15.9)
INR PPP: 1 (ref 0.85–1.16)
MCH RBC QN AUTO: 26.6 PG (ref 26.6–33)
MCHC RBC AUTO-ENTMCNC: 31.1 G/DL (ref 31.5–35.7)
MCV RBC AUTO: 85.5 FL (ref 79–97)
PLATELET # BLD AUTO: 441 10*3/MM3 (ref 140–450)
PMV BLD AUTO: 9.3 FL (ref 6–12)
POTASSIUM SERPL-SCNC: 4.4 MMOL/L (ref 3.5–5.2)
PROTHROMBIN TIME: 12.9 SECONDS (ref 11.5–14)
RBC # BLD AUTO: 4.06 10*6/MM3 (ref 3.77–5.28)
SODIUM SERPL-SCNC: 138 MMOL/L (ref 136–145)
WBC # BLD AUTO: 6.2 10*3/MM3 (ref 3.4–10.8)

## 2020-12-11 PROCEDURE — C1892 INTRO/SHEATH,FIXED,PEEL-AWAY: HCPCS | Performed by: INTERNAL MEDICINE

## 2020-12-11 PROCEDURE — 93641 EP EVL 1/2CHMB PAC CVDFB TST: CPT | Performed by: INTERNAL MEDICINE

## 2020-12-11 PROCEDURE — 33249 INSJ/RPLCMT DEFIB W/LEAD(S): CPT | Performed by: INTERNAL MEDICINE

## 2020-12-11 PROCEDURE — C1769 GUIDE WIRE: HCPCS | Performed by: INTERNAL MEDICINE

## 2020-12-11 PROCEDURE — C1777 LEAD, AICD, ENDO SINGLE COIL: HCPCS | Performed by: INTERNAL MEDICINE

## 2020-12-11 PROCEDURE — C1900 LEAD, CORONARY VENOUS: HCPCS | Performed by: INTERNAL MEDICINE

## 2020-12-11 PROCEDURE — 85027 COMPLETE CBC AUTOMATED: CPT

## 2020-12-11 PROCEDURE — C1882 AICD, OTHER THAN SING/DUAL: HCPCS | Performed by: INTERNAL MEDICINE

## 2020-12-11 PROCEDURE — 83036 HEMOGLOBIN GLYCOSYLATED A1C: CPT | Performed by: PHYSICIAN ASSISTANT

## 2020-12-11 PROCEDURE — 0 IOPAMIDOL PER 1 ML: Performed by: INTERNAL MEDICINE

## 2020-12-11 PROCEDURE — 99152 MOD SED SAME PHYS/QHP 5/>YRS: CPT | Performed by: INTERNAL MEDICINE

## 2020-12-11 PROCEDURE — 25010000003 CEFAZOLIN IN DEXTROSE 2-4 GM/100ML-% SOLUTION: Performed by: PHYSICIAN ASSISTANT

## 2020-12-11 PROCEDURE — 33225 L VENTRIC PACING LEAD ADD-ON: CPT | Performed by: INTERNAL MEDICINE

## 2020-12-11 PROCEDURE — 25010000002 ONDANSETRON PER 1 MG: Performed by: INTERNAL MEDICINE

## 2020-12-11 PROCEDURE — 80048 BASIC METABOLIC PNL TOTAL CA: CPT

## 2020-12-11 PROCEDURE — 25010000002 FENTANYL CITRATE (PF) 100 MCG/2ML SOLUTION: Performed by: INTERNAL MEDICINE

## 2020-12-11 PROCEDURE — C1887 CATHETER, GUIDING: HCPCS | Performed by: INTERNAL MEDICINE

## 2020-12-11 PROCEDURE — 25010000002 MIDAZOLAM PER 1 MG: Performed by: INTERNAL MEDICINE

## 2020-12-11 PROCEDURE — C1898 LEAD, PMKR, OTHER THAN TRANS: HCPCS | Performed by: INTERNAL MEDICINE

## 2020-12-11 PROCEDURE — 25010000002 CEFAZOLIN PER 500 MG: Performed by: INTERNAL MEDICINE

## 2020-12-11 PROCEDURE — C1730 CATH, EP, 19 OR FEW ELECT: HCPCS | Performed by: INTERNAL MEDICINE

## 2020-12-11 PROCEDURE — 25010000003 LIDOCAINE 1 % SOLUTION: Performed by: INTERNAL MEDICINE

## 2020-12-11 PROCEDURE — 85610 PROTHROMBIN TIME: CPT

## 2020-12-11 PROCEDURE — 99153 MOD SED SAME PHYS/QHP EA: CPT | Performed by: INTERNAL MEDICINE

## 2020-12-11 DEVICE — LD QUARTET LV S/CRV BIPOL 1458Q/86: Type: IMPLANTABLE DEVICE | Status: FUNCTIONAL

## 2020-12-11 DEVICE — ICD QUADRA ASSURA NXGN MP CRTD40 CD336940Q: Type: IMPLANTABLE DEVICE | Status: FUNCTIONAL

## 2020-12-11 DEVICE — LD PM TENDRIL STS 6F52CM 2088TC52: Type: IMPLANTABLE DEVICE | Status: FUNCTIONAL

## 2020-12-11 DEVICE — LD DEFIB DURATA SJ4 65CM 7122Q65: Type: IMPLANTABLE DEVICE | Status: FUNCTIONAL

## 2020-12-11 RX ORDER — ACETAMINOPHEN 325 MG/1
650 TABLET ORAL EVERY 4 HOURS PRN
Status: DISCONTINUED | OUTPATIENT
Start: 2020-12-11 | End: 2020-12-11 | Stop reason: HOSPADM

## 2020-12-11 RX ORDER — CEFAZOLIN SODIUM 2 G/100ML
2 INJECTION, SOLUTION INTRAVENOUS ONCE
Status: COMPLETED | OUTPATIENT
Start: 2020-12-11 | End: 2020-12-11

## 2020-12-11 RX ORDER — FENTANYL CITRATE 50 UG/ML
INJECTION, SOLUTION INTRAMUSCULAR; INTRAVENOUS AS NEEDED
Status: DISCONTINUED | OUTPATIENT
Start: 2020-12-11 | End: 2020-12-11 | Stop reason: HOSPADM

## 2020-12-11 RX ORDER — SODIUM CHLORIDE 0.9 % (FLUSH) 0.9 %
10 SYRINGE (ML) INJECTION AS NEEDED
Status: DISCONTINUED | OUTPATIENT
Start: 2020-12-11 | End: 2020-12-11 | Stop reason: HOSPADM

## 2020-12-11 RX ORDER — BUPIVACAINE HYDROCHLORIDE 5 MG/ML
INJECTION, SOLUTION EPIDURAL; INTRACAUDAL AS NEEDED
Status: DISCONTINUED | OUTPATIENT
Start: 2020-12-11 | End: 2020-12-11 | Stop reason: HOSPADM

## 2020-12-11 RX ORDER — CEFAZOLIN SODIUM 2 G/100ML
2 INJECTION, SOLUTION INTRAVENOUS EVERY 8 HOURS
Status: COMPLETED | OUTPATIENT
Start: 2020-12-11 | End: 2020-12-12

## 2020-12-11 RX ORDER — NITROGLYCERIN 0.4 MG/1
0.4 TABLET SUBLINGUAL
Status: DISCONTINUED | OUTPATIENT
Start: 2020-12-11 | End: 2020-12-11 | Stop reason: HOSPADM

## 2020-12-11 RX ORDER — FUROSEMIDE 20 MG/1
20 TABLET ORAL DAILY PRN
Status: DISCONTINUED | OUTPATIENT
Start: 2020-12-11 | End: 2020-12-12 | Stop reason: HOSPADM

## 2020-12-11 RX ORDER — SODIUM CHLORIDE 9 MG/ML
1 INJECTION, SOLUTION INTRAVENOUS CONTINUOUS
Status: ACTIVE | OUTPATIENT
Start: 2020-12-11 | End: 2020-12-11

## 2020-12-11 RX ORDER — MONTELUKAST SODIUM 10 MG/1
10 TABLET ORAL NIGHTLY
Status: DISCONTINUED | OUTPATIENT
Start: 2020-12-11 | End: 2020-12-12 | Stop reason: HOSPADM

## 2020-12-11 RX ORDER — VALSARTAN 80 MG/1
40 TABLET ORAL NIGHTLY
Status: DISCONTINUED | OUTPATIENT
Start: 2020-12-11 | End: 2020-12-12 | Stop reason: HOSPADM

## 2020-12-11 RX ORDER — LIDOCAINE HYDROCHLORIDE 10 MG/ML
INJECTION, SOLUTION INFILTRATION; PERINEURAL AS NEEDED
Status: DISCONTINUED | OUTPATIENT
Start: 2020-12-11 | End: 2020-12-11 | Stop reason: HOSPADM

## 2020-12-11 RX ORDER — ACETAMINOPHEN 325 MG/1
650 TABLET ORAL EVERY 4 HOURS PRN
Status: DISCONTINUED | OUTPATIENT
Start: 2020-12-11 | End: 2020-12-12 | Stop reason: HOSPADM

## 2020-12-11 RX ORDER — SODIUM CHLORIDE 0.9 % (FLUSH) 0.9 %
3 SYRINGE (ML) INJECTION EVERY 12 HOURS SCHEDULED
Status: DISCONTINUED | OUTPATIENT
Start: 2020-12-11 | End: 2020-12-11 | Stop reason: HOSPADM

## 2020-12-11 RX ORDER — ONDANSETRON 2 MG/ML
INJECTION INTRAMUSCULAR; INTRAVENOUS AS NEEDED
Status: DISCONTINUED | OUTPATIENT
Start: 2020-12-11 | End: 2020-12-11 | Stop reason: HOSPADM

## 2020-12-11 RX ORDER — POTASSIUM CHLORIDE 750 MG/1
10 CAPSULE, EXTENDED RELEASE ORAL DAILY
Status: DISCONTINUED | OUTPATIENT
Start: 2020-12-11 | End: 2020-12-12 | Stop reason: HOSPADM

## 2020-12-11 RX ORDER — SODIUM CHLORIDE 0.9 % (FLUSH) 0.9 %
10 SYRINGE (ML) INJECTION AS NEEDED
Status: DISCONTINUED | OUTPATIENT
Start: 2020-12-11 | End: 2020-12-12 | Stop reason: HOSPADM

## 2020-12-11 RX ORDER — SODIUM CHLORIDE 9 MG/ML
INJECTION, SOLUTION INTRAVENOUS CONTINUOUS PRN
Status: COMPLETED | OUTPATIENT
Start: 2020-12-11 | End: 2020-12-11

## 2020-12-11 RX ORDER — HYDROCODONE BITARTRATE AND ACETAMINOPHEN 5; 325 MG/1; MG/1
1 TABLET ORAL EVERY 4 HOURS PRN
Status: DISCONTINUED | OUTPATIENT
Start: 2020-12-11 | End: 2020-12-12 | Stop reason: HOSPADM

## 2020-12-11 RX ORDER — ONDANSETRON 2 MG/ML
4 INJECTION INTRAMUSCULAR; INTRAVENOUS EVERY 6 HOURS PRN
Status: DISCONTINUED | OUTPATIENT
Start: 2020-12-11 | End: 2020-12-12 | Stop reason: HOSPADM

## 2020-12-11 RX ORDER — ACETAMINOPHEN 650 MG/1
650 SUPPOSITORY RECTAL EVERY 4 HOURS PRN
Status: DISCONTINUED | OUTPATIENT
Start: 2020-12-11 | End: 2020-12-12 | Stop reason: HOSPADM

## 2020-12-11 RX ORDER — MIDAZOLAM HYDROCHLORIDE 1 MG/ML
INJECTION INTRAMUSCULAR; INTRAVENOUS AS NEEDED
Status: DISCONTINUED | OUTPATIENT
Start: 2020-12-11 | End: 2020-12-11 | Stop reason: HOSPADM

## 2020-12-11 RX ORDER — CARVEDILOL 6.25 MG/1
6.25 TABLET ORAL 2 TIMES DAILY WITH MEALS
Status: DISCONTINUED | OUTPATIENT
Start: 2020-12-11 | End: 2020-12-12 | Stop reason: HOSPADM

## 2020-12-11 RX ORDER — CETIRIZINE HYDROCHLORIDE 10 MG/1
10 TABLET ORAL DAILY
Status: DISCONTINUED | OUTPATIENT
Start: 2020-12-11 | End: 2020-12-12 | Stop reason: HOSPADM

## 2020-12-11 RX ORDER — SODIUM CHLORIDE 0.9 % (FLUSH) 0.9 %
3 SYRINGE (ML) INJECTION EVERY 12 HOURS SCHEDULED
Status: DISCONTINUED | OUTPATIENT
Start: 2020-12-11 | End: 2020-12-12 | Stop reason: HOSPADM

## 2020-12-11 RX ORDER — SPIRONOLACTONE 25 MG/1
25 TABLET ORAL DAILY
Status: DISCONTINUED | OUTPATIENT
Start: 2020-12-11 | End: 2020-12-12 | Stop reason: HOSPADM

## 2020-12-11 RX ADMIN — ACETAMINOPHEN 650 MG: 325 TABLET, FILM COATED ORAL at 17:37

## 2020-12-11 RX ADMIN — SODIUM CHLORIDE 1 ML/KG/HR: 9 INJECTION, SOLUTION INTRAVENOUS at 06:52

## 2020-12-11 RX ADMIN — SODIUM CHLORIDE, PRESERVATIVE FREE 3 ML: 5 INJECTION INTRAVENOUS at 21:43

## 2020-12-11 RX ADMIN — ACETAMINOPHEN 650 MG: 325 TABLET, FILM COATED ORAL at 21:42

## 2020-12-11 RX ADMIN — CEFAZOLIN SODIUM 2 G: 10 INJECTION, POWDER, FOR SOLUTION INTRAVENOUS at 18:45

## 2020-12-11 RX ADMIN — VALSARTAN 40 MG: 80 TABLET, FILM COATED ORAL at 21:42

## 2020-12-11 RX ADMIN — CEFAZOLIN SODIUM 2 G: 2 INJECTION, SOLUTION INTRAVENOUS at 08:24

## 2020-12-11 RX ADMIN — CARVEDILOL 6.25 MG: 6.25 TABLET, FILM COATED ORAL at 18:45

## 2020-12-11 RX ADMIN — ACETAMINOPHEN 650 MG: 325 TABLET, FILM COATED ORAL at 13:23

## 2020-12-11 NOTE — H&P
Cardiology H&P     Valeria Bobby  1950  [phone]  There is no work phone number on file.    12/11/20    DATE OF ADMISSION: 12/11/2020  [facility] Lor Gonzalez, APRN  [address] / [place] [address]  Referring Provider: Jeremiah Biswsa MD     No chief complaint on file.    CC: Chronic systollic heart failure     Problem List:     1)Chronic Systolic Heart Failure; NICM      A) Echo 7/11/2020 Left ventricular systolic function is severely decreased.EF 20%. moderately dilated LV .  moderately dilated LA Mild MR     B) Echo 10/15/2020: EF 29%, moderately decreased LV function.  mild LV dilation,      C)7/13/20 - C : LM normal, LAD normal, LCX no obstructive disease, RCA no obstructive disease   2) LBBB  3)Anemia   4)Chronic Kidney Disease     History of Present Illness:     Ms Bobby is a pleasant 70 year old white female who presents today BiV ICD implantation for her chronic systolic heart failure. Since February 2020 she noted increasing shortness of breath and  weakness that became severe over the course of the year. She had profound shortness of breath, increased swelling in her belly. This prompted a CHF admission on July 11th 2020. She had an echo showing a depressed ejection fraction of 20%, Her heart cath was unremarkable she was discharged home on metoprolol, valsartan, aldactone, which she has been compliant with. She had a follow up echo in October and her EF was 29%. She continues with SOB, JARVIS, poor exertional tolerance. She has been wearing a life vest at home. She was last seen by Dr. Biswas 10/21/20. Since her last visit patient states shes has been having more severe fatigue and increased shortness of breath, feels as if she is going down hill. She states about 3 weeks ago she had a day she was not wearing her life vest and felt disoriented and stumbled for a few minutes. She also admits to having some palpitations at times. Denies any CP, presyncope or  true syncope, TIA/CVA symptoms.     Allergies   Allergen Reactions   • Codeine Nausea And Vomiting       Prior to Admission Medications     Prescriptions Last Dose Informant Patient Reported? Taking?    carvedilol (COREG) 6.25 MG tablet 12/11/2020  No Yes    Take 1 tablet by mouth 2 (Two) Times a Day With Meals.    cetirizine (zyrTEC) 10 MG tablet Past Month Medication Bottle Yes Yes    Take 10 mg by mouth As Needed.    montelukast (SINGULAIR) 10 MG tablet 12/10/2020 Medication Bottle Yes Yes    Take 10 mg by mouth Every Night.    spironolactone (ALDACTONE) 25 MG tablet 12/10/2020  No Yes    Take 1 tablet by mouth Daily.    valsartan (DIOVAN) 40 MG tablet 12/10/2020  No Yes    Take 1 tablet by mouth Every Night.    furosemide (LASIX) 20 MG tablet Unknown  No No    Take 1 tablet by mouth As Needed (edema, >3lb weight gain).    potassium chloride (K-DUR) 10 MEQ CR tablet Unknown  No No    Take 1 tablet by mouth As Needed (take with lasix).            Current Facility-Administered Medications:   •  acetaminophen (TYLENOL) tablet 650 mg, 650 mg, Oral, Q4H PRN, Sanjuana Holder PA  •  ceFAZolin in dextrose (ANCEF) IVPB solution 2 g, 2 g, Intravenous, Once, Sanjuana Holder PA  •  nitroglycerin (NITROSTAT) SL tablet 0.4 mg, 0.4 mg, Sublingual, Q5 Min PRN, Sanjuana Holder PA  •  sodium chloride 0.9 % flush 10 mL, 10 mL, Intravenous, PRN, Sanjuana Holder PA  •  sodium chloride 0.9 % flush 3 mL, 3 mL, Intravenous, Q12H, Sanjuana Holder PA  •  sodium chloride 0.9 % infusion, 1 mL/kg/hr (Order-Specific), Intravenous, Continuous, Sanjuana Holder PA, Last Rate: 65.8 mL/hr at 12/11/20 0652, 1 mL/kg/hr at 12/11/20 0652    Social History     Socioeconomic History   • Marital status:      Spouse name: Not on file   • Number of children: Not on file   • Years of education: Not on file   • Highest education level: Not on file   Tobacco Use   • Smoking status: Never Smoker   • Smokeless tobacco: Never Used   Substance and  "Sexual Activity   • Alcohol use: Never     Frequency: Never   • Drug use: Never   • Sexual activity: Defer   Social History Narrative    caffeine use: drinks decaf, occassionally drinks coke cola       Family History   Problem Relation Age of Onset   • Heart attack Father    • Ovarian cancer Neg Hx    • Breast cancer Neg Hx        REVIEW OF SYSTEMS:   CONST:  No weight loss, fever, chills, +weakness +fatigue.   HEENT:  No visual loss, blurred vision, double vision, yellow sclerae.                   No hearing loss, congestion, sore throat.   SKIN:      No rashes, urticaria, ulcers, sores.     RESP:     + shortness of breath, - hemoptysis, cough, sputum.   GI:           No anorexia, nausea, vomiting, diarrhea. No abdominal pain, melena.   :         No burning on urination, hematuria or increased frequency.  ENDO:    No diaphoresis, cold or heat intolerance. No polyuria or polydipsia.   NEURO:  No headache, dizziness, syncope, paralysis, ataxia, or parasthesias.                  No change in bowel or bladder control. No history of CVA/TIA  MUSC:    No muscle, back pain, joint pain or stiffness.   HEME:    No anemia, bleeding, bruising. No history of DVT/PE.  PSYCH:  No history of depression, anxiety    Vitals:    12/11/20 0628   BP: 143/61   BP Location: Right arm   Pulse: 63   Temp: 97.6 °F (36.4 °C)   TempSrc: Oral   Weight: 65 kg (143 lb 4.8 oz)   Height: 154.9 cm (61\")         Vital Sign Min/Max for last 24 hours  Temp  Min: 97.6 °F (36.4 °C)  Max: 97.6 °F (36.4 °C)   BP  Min: 143/61  Max: 143/61   Pulse  Min: 63  Max: 63   No data recorded   No data recorded   No data recorded    No intake or output data in the 24 hours ending 12/11/20 0722          Physical Exam:    GEN: Well nourished, Well- developed  No acute distress  HEENT: Normocephalic, Atraumatic, PERRLA, moist mucous membranes  NECK: supple, NO JVD, no thyromegaly, no lymphadenopathy  CARD: S1S2  RRR no murmur, gallop, rub  LUNGS: Clear to " auscultataion, normal respiratory effort  ABDOMEN: Soft, nontender, normal bowel sounds  EXTREMITIES: No gross deformities,  No clubbing, cyanosis, or edema  SKIN: Warm, dry  NEURO: No focal deficits  PSYCHIATRIC: Normal affect and mood      I personally viewed and interpreted the patient's EKG/Telemetry/lab data    Data:   Results from last 7 days   Lab Units 12/11/20  0629   WBC 10*3/mm3 6.20   HEMOGLOBIN g/dL 10.8*   HEMATOCRIT % 34.7   PLATELETS 10*3/mm3 441     Results from last 7 days   Lab Units 12/11/20  0628   SODIUM mmol/L 138   POTASSIUM mmol/L 4.4   CHLORIDE mmol/L 104   CO2 mmol/L 24.0   BUN mg/dL 26*   CREATININE mg/dL 1.02*   GLUCOSE mg/dL 92                  Results from last 7 days   Lab Units 12/11/20  0628   PROTIME Seconds 12.9   INR  1.00                 No intake or output data in the 24 hours ending 12/11/20 0722    Chest X-Ray:  Imaging Results (Last 24 Hours)     ** No results found for the last 24 hours. **        COVID19   Date Value Ref Range Status   07/10/2020 Not Detected Not Detected - Ref. Range Final         Telemetry: NSR 66 bpm  EKG: none today    Assessment and Plan:     1) Chronic Systolic Heart Failure: NICM. Class III CHF Pt has had a chronically suppressed EF over a period of >3 months despite guideline directed medical therapy. Meds limited by CRI.  Given her wide QRS she could benefit from a Biv -ICD. Plan for patient to undergo BiV ICD implantation today with Dr. Biswas. The risks, benefits, and alternatives of the procedure have been reviewed and the patient wishes to proceed. Discussed post procedure restrictions. Plan for discharge home tomorrow with wound check in 1 week and follow up in 3 months.     2) LBBB- QRS 150ms     3)Chronic Kidney Disease- Cr 8/2020 1.17 - caution titrating medical therapy.     Shared Decision Making: Both the Physician and Nurse practitioner have discussed the BiV ICD implant and allowed patient to ask questions.       Electronically  signed by Martha Ortega, GINGER, 12/11/20, 7:23 AM EST.

## 2020-12-12 ENCOUNTER — APPOINTMENT (OUTPATIENT)
Dept: GENERAL RADIOLOGY | Facility: HOSPITAL | Age: 70
End: 2020-12-12

## 2020-12-12 VITALS
OXYGEN SATURATION: 98 % | RESPIRATION RATE: 16 BRPM | BODY MASS INDEX: 27.06 KG/M2 | TEMPERATURE: 97.6 F | WEIGHT: 143.3 LBS | HEIGHT: 61 IN | SYSTOLIC BLOOD PRESSURE: 112 MMHG | HEART RATE: 74 BPM | DIASTOLIC BLOOD PRESSURE: 55 MMHG

## 2020-12-12 PROCEDURE — 25010000002 CEFAZOLIN PER 500 MG: Performed by: INTERNAL MEDICINE

## 2020-12-12 PROCEDURE — 99024 POSTOP FOLLOW-UP VISIT: CPT | Performed by: PHYSICIAN ASSISTANT

## 2020-12-12 PROCEDURE — 71046 X-RAY EXAM CHEST 2 VIEWS: CPT

## 2020-12-12 RX ADMIN — CARVEDILOL 6.25 MG: 6.25 TABLET, FILM COATED ORAL at 08:50

## 2020-12-12 RX ADMIN — CEFAZOLIN SODIUM 2 G: 10 INJECTION, POWDER, FOR SOLUTION INTRAVENOUS at 00:35

## 2020-12-12 NOTE — PLAN OF CARE
Goal Outcome Evaluation:  Plan of Care Reviewed With: patient  Progress: improving  Outcome Summary: VSS, room air. Pt denies pain. L arm in sling. Pt ambulating, voiding, and toelrating PO. Eager for discharge home with daughter.

## 2020-12-12 NOTE — DISCHARGE SUMMARY
Physician Discharge Summary     Patient ID:  Valeria Bobby  [id number]  70 y.o.  1950    Admit date: 12/11/2020    Discharge date and time: No discharge date for patient encounter.     Admitting Physician: Jeremiah Biswas MD     Primary Physician: Lor Pulliam APRN    Discharge Physician: YANG Kirkpatrick    Admission Diagnoses: Chronic systolic heart failure (CMS/HCC) [I50.22]  LBBB (left bundle branch block) [I44.7]  DCM (dilated cardiomyopathy) (CMS/HCC) [I42.0]  Chronic systolic heart failure (CMS/HCC) [I50.22]    Discharge Diagnoses:   Patient Active Problem List    Diagnosis   • DCM (dilated cardiomyopathy) (CMS/HCC) [I42.0]   • ANA (acute kidney injury) (CMS/HCC) [N17.9]   • Pneumonia of right upper lobe due to infectious organism [J18.9]   • LBBB (left bundle branch block) [I44.7]   • Abnormal TSH [R79.89]   • Anemia, unspecified [D64.9]   • Chronic systolic heart failure (CMS/HCC) [I50.22]       Cardiology Procedures this admission:    1. Implantation of a biventricular pacemaker implantable cardioverter-defibrillator.  2. Insertion of a coronary sinus pacing lead.  3. Moderate sedation  4. Defibrillation threshold testing for ventricular fibrillation.    Hospital Course: Patient had cardiac device placement. Post device CXR and device check were stable. Please see operation report for full implant details. Dressing at L upper chest was CDI with no hematoma. Patient had been ambulating with no issues.     Discharge Exam:    Vitals:    12/12/20 0733   BP: 112/55   Pulse: 74   Resp: 16   Temp: 97.6 °F (36.4 °C)   SpO2: 98%      General-Well Nourished, Well developed  Eyes - PERRLA  Neck- supple, No mass  CV- regular rate and rhythm, no MRG, No edema  Lung- clear bilaterally  Abd- soft, +BS  Musc/skel - Norm strength and range of motion  Skin- warm and dry; bandage at L chest CDI; no hematoma   Neuro - Alert & Oriented x 3, appropriate mood.    Disposition: Patient will be discharged  home    Patient discharge medications:      Your medication list      CONTINUE taking these medications      Instructions Last Dose Given Next Dose Due   carvedilol 6.25 MG tablet  Commonly known as: COREG      Take 1 tablet by mouth 2 (Two) Times a Day With Meals.       cetirizine 10 MG tablet  Commonly known as: zyrTEC      Take 10 mg by mouth As Needed.       furosemide 20 MG tablet  Commonly known as: LASIX      Take 1 tablet by mouth As Needed (edema, >3lb weight gain).       montelukast 10 MG tablet  Commonly known as: SINGULAIR      Take 10 mg by mouth Every Night.       potassium chloride 10 MEQ CR tablet      Take 1 tablet by mouth As Needed (take with lasix).       spironolactone 25 MG tablet  Commonly known as: ALDACTONE      Take 1 tablet by mouth Daily.       valsartan 40 MG tablet  Commonly known as: DIOVAN      Take 1 tablet by mouth Every Night.              Referenced discharge instructions provided by nursing for diet and activity.    Follow-up with Pacemaker/ICD Clinic in 7 to 10 days    Signed:  YANG Kirkpatrick  12/12/2020  08:29 EST

## 2020-12-12 NOTE — PLAN OF CARE
Problem: Adult Inpatient Plan of Care  Goal: Plan of Care Review  12/12/2020 0429 by Rachel Yañez RN  Outcome: Ongoing, Progressing  Flowsheets (Taken 12/12/2020 0429)  Outcome Summary: Pt trasnferred from [facility]. Pt has been recovering well from pacemaker placement. Dressing on left upper chest intact. Pain is controlled with prn tylenol, she is indepent and ambulating well. Left arm remains in sling. Will continue to monitor     Problem: Adult Inpatient Plan of Care  Goal: Patient-Specific Goal (Individualized)  Outcome: Ongoing, Progressing  Flowsheets (Taken 12/12/2020 0429)  Patient-Specific Goals (Include Timeframe): Monitor pain q2h     Problem: Adult Inpatient Plan of Care  Goal: Absence of Hospital-Acquired Illness or Injury  Outcome: Ongoing, Progressing     Problem: Adult Inpatient Plan of Care  Goal: Absence of Hospital-Acquired Illness or Injury  Intervention: Identify and Manage Fall Risk  Recent Flowsheet Documentation  Taken 12/12/2020 0200 by Rachel Yañez RN  Safety Promotion/Fall Prevention: activity supervised  Taken 12/12/2020 0000 by Rachel Yañez RN  Safety Promotion/Fall Prevention: activity supervised  Taken 12/11/2020 2200 by Rachel Yañez RN  Safety Promotion/Fall Prevention: activity supervised  Taken 12/11/2020 2030 by Rachel Yañez RN  Safety Promotion/Fall Prevention: activity supervised     Problem: Adult Inpatient Plan of Care  Goal: Absence of Hospital-Acquired Illness or Injury  Intervention: Prevent Skin Injury  Recent Flowsheet Documentation  Taken 12/12/2020 0200 by Rachel Yañez RN  Body Position: position changed independently  Taken 12/12/2020 0000 by Rachel Yañez RN  Body Position: position changed independently  Taken 12/11/2020 2200 by Rachel Yañez RN  Body Position: position changed independently  Taken 12/11/2020 2030 by Rachel Yañez RN  Body Position: position changed independently     Problem: Adult Inpatient Plan of  Care  Goal: Optimal Comfort and Wellbeing  Outcome: Ongoing, Progressing     Problem: Adult Inpatient Plan of Care  Goal: Optimal Comfort and Wellbeing  Intervention: Provide Person-Centered Care  Recent Flowsheet Documentation  Taken 12/11/2020 2030 by Rachel Yañez RN  Trust Relationship/Rapport: care explained     Problem: Adult Inpatient Plan of Care  Goal: Readiness for Transition of Care  Outcome: Ongoing, Progressing     Problem: Adult Inpatient Plan of Care  Goal: Readiness for Transition of Care  Intervention: Mutually Develop Transition Plan  Recent Flowsheet Documentation  Taken 12/11/2020 2145 by Rachel Yañez RN  Transportation Anticipated: family or friend will provide  Patient/Family Anticipated Services at Transition: none  Patient/Family Anticipates Transition to: home     Problem: Adjustment to Illness (Cardiac Rhythm Management Device)  Goal: Optimal Adjustment to Device Presence  Outcome: Ongoing, Progressing     Problem: Bleeding (Cardiac Rhythm Management Device)  Goal: Absence of Bleeding  Outcome: Ongoing, Progressing     Problem: Device-Related Complication Risk (Cardiac Rhythm Management Device)  Goal: Effective Device Function  Outcome: Ongoing, Progressing     Problem: Infection (Cardiac Rhythm Management Device)  Goal: Absence of Infection Signs and Symptoms  Outcome: Ongoing, Progressing     Problem: Pain (Cardiac Rhythm Management Device)  Goal: Acceptable Pain Level  Outcome: Ongoing, Progressing     Problem: Respiratory Compromise (Cardiac Rhythm Management Device)  Goal: Effective Respiratory Effort and Oxygenation  Outcome: Ongoing, Progressing   Goal Outcome Evaluation:        Outcome Summary: Pt trasnferred from [facility]. Pt has been recovering well from pacemaker placement. Dressing on left upper chest intact. Pain is controlled with prn tylenol, she is indepent and ambulating well. Left arm remains in sling. Will continue to monitor

## 2020-12-12 NOTE — PROGRESS NOTES
Case Management Discharge Note      Final Note: Pt's plans are to dc to home with daughter. No needs are noted. Daughter to provide transport.         Selected Continued Care - Admitted Since 12/11/2020     Destination    No services have been selected for the patient.              Durable Medical Equipment    No services have been selected for the patient.              Dialysis/Infusion    No services have been selected for the patient.              Home Medical Care    No services have been selected for the patient.              Therapy    No services have been selected for the patient.              Community Resources    No services have been selected for the patient.                       Final Discharge Disposition Code: 01 - home or self-care

## 2020-12-18 ENCOUNTER — OFFICE VISIT (OUTPATIENT)
Dept: CARDIOLOGY | Facility: CLINIC | Age: 70
End: 2020-12-18

## 2020-12-18 DIAGNOSIS — I50.22 CHRONIC SYSTOLIC HEART FAILURE (HCC): Primary | ICD-10-CM

## 2020-12-18 PROCEDURE — 99024 POSTOP FOLLOW-UP VISIT: CPT | Performed by: INTERNAL MEDICINE

## 2020-12-18 NOTE — PROGRESS NOTES
2020    Valeria Bobby, : 1950    WOUND CHECK      Patient has fever: [] Temperature if indicated:     Wound Location: Left Infraclavicular     Dressing Removed [x]        Old Dressing Appearance:  Clean, dry []                 Old, bloody drainage [x]                            Moist, serous drainage []                Moist, thick yellow/green drainage []       Wound Appearance: Redness []                  Drainage []                  Culture obtained []        Color: N/A     Consistency: n/a     Amount: none         Gloves used, wound cleansed with sterile 4x4 and peroxide [x]       MD notified [] MD orders:     Antibiotic started []  If checked, type   Other:     Appointment for follow-up scheduled for 3 months post procedure [x]    Future Appointments   Date Time Provider Department Center   2021 11:45 AM Bobby Peralta III, MD [facility] None   3/17/2021 11:15 AM Jeremiah Biswas MD [facility] None           Tim Ochoa MA, 20      MD Signature:______________________________ Completed By/Date:

## 2021-01-20 ENCOUNTER — OFFICE VISIT (OUTPATIENT)
Dept: CARDIOLOGY | Facility: CLINIC | Age: 71
End: 2021-01-20

## 2021-01-20 VITALS
DIASTOLIC BLOOD PRESSURE: 78 MMHG | SYSTOLIC BLOOD PRESSURE: 130 MMHG | HEIGHT: 61 IN | WEIGHT: 152 LBS | HEART RATE: 75 BPM | OXYGEN SATURATION: 97 % | BODY MASS INDEX: 28.7 KG/M2

## 2021-01-20 DIAGNOSIS — I50.22 CHRONIC SYSTOLIC HEART FAILURE (HCC): Primary | ICD-10-CM

## 2021-01-20 DIAGNOSIS — I42.0 DCM (DILATED CARDIOMYOPATHY) (HCC): ICD-10-CM

## 2021-01-20 PROCEDURE — 99213 OFFICE O/P EST LOW 20 MIN: CPT | Performed by: INTERNAL MEDICINE

## 2021-01-20 RX ORDER — CARVEDILOL 12.5 MG/1
12.5 TABLET ORAL 2 TIMES DAILY
Qty: 180 TABLET | Refills: 3 | Status: SHIPPED | OUTPATIENT
Start: 2021-01-20 | End: 2021-04-21 | Stop reason: SINTOL

## 2021-01-20 RX ORDER — SPIRONOLACTONE 25 MG/1
25 TABLET ORAL DAILY
Qty: 90 TABLET | Refills: 3 | Status: SHIPPED | OUTPATIENT
Start: 2021-01-20 | End: 2021-07-08

## 2021-01-20 NOTE — PROGRESS NOTES
[facility]  Office visit  Valeria Bobby  1950    There is no work phone number on file.    VISIT DATE:  1/20/2021    PCP: Lor Pulliam, APRN  [address]  [facility] [address]    CC:  Chief Complaint   Patient presents with   • Chronic systolic heart failure     f/u       Previous cardiac studies and procedures:  July 2020  Echo  · Left ventricular systolic function is severely decreased.  · Calculated EF = 20%. Estimated EF was in agreement with the calculated EF.  · The left ventricular cavity is moderately dilated.  · Left atrial cavity size is moderately dilated.  · Mild mitral valve regurgitation is present  Cardiac catheterization: No angiographically significant coronary disease.    August 2020 echo  · Left ventricular systolic function is severely decreased.  · Estimated EF appears to be in the range of 26 - 30%.    October 2020 echo  · Calculated left ventricular EF = 29% Estimated left ventricular EF was in agreement with the calculated left ventricular EF. Left ventricular systolic function is moderately decreased.  · The left ventricular cavity is mildly dilated.    December 2020: [facility] CRT-D    ASSESSMENT:   Diagnosis Plan   1. Chronic systolic heart failure (CMS/HCC)     2. DCM (dilated cardiomyopathy) (CMS/HCC)         PLAN:  Continue carvedilol(titrating to 12.5 mg p.o. twice daily today) and spironolactone 25 mg p.o. daily, valsartan 40 mg p.o. at bedtime.  Unlikely to tolerate switching to Entresto at this time.      Subjective  Stable functional capacity.   Denies dyspnea, chest pain or palpitations. Blood pressures running less than 120/80 mmHg, systolic blood pressures often running less than 110 mmHg.  She is very compliant with medical therapy.  Tolerated CRT-D implantation very well.    PHYSICAL EXAMINATION:  Vitals:    01/20/21 1137   BP: 130/78   BP Location: Left arm   Patient Position: Sitting   Pulse: 75   SpO2: 97%   Weight: 68.9 kg (152 lb)  "  Height: 154.9 cm (61\")     General Appearance:    Alert, cooperative, no distress, appears stated age   Head:    Normocephalic, without obvious abnormality, atraumatic   Eyes:    conjunctiva/corneas clear   Nose:   Nares normal, septum midline, mucosa normal, no drainage   Throat:   Lips, teeth and gums normal   Neck:   Supple, symmetrical, trachea midline, no carotid    bruit or JVD   Lungs:     Clear to auscultation bilaterally, respirations unlabored   Chest Wall:    No tenderness or deformity    Heart:    Regular rate and rhythm, S1 and S2 normal, no murmur, rub   or gallop, normal carotid impulse bilaterally without bruit.   Abdomen:     Soft, non-tender   Extremities:   Extremities normal, atraumatic, no cyanosis or edema   Pulses:   2+ and symmetric all extremities   Skin:   Skin color, texture, turgor normal, no rashes or lesions       Diagnostic Data:  Procedures  No results found for: CHLPL, TRIG, HDL, LDLDIRECT  Lab Results   Component Value Date    GLUCOSE 92 12/11/2020    BUN 26 (H) 12/11/2020    CREATININE 1.02 (H) 12/11/2020     12/11/2020    K 4.4 12/11/2020     12/11/2020    CO2 24.0 12/11/2020     Lab Results   Component Value Date    HGBA1C 5.40 12/11/2020     Lab Results   Component Value Date    WBC 6.20 12/11/2020    HGB 10.8 (L) 12/11/2020    HCT 34.7 12/11/2020     12/11/2020       Allergies  Allergies   Allergen Reactions   • Codeine Nausea And Vomiting       Current Medications    Current Outpatient Medications:   •  carvedilol (COREG) 6.25 MG tablet, Take 1 tablet by mouth 2 (Two) Times a Day With Meals., Disp: 180 tablet, Rfl: 1  •  cetirizine (zyrTEC) 10 MG tablet, Take 10 mg by mouth As Needed., Disp: , Rfl:   •  furosemide (LASIX) 20 MG tablet, Take 1 tablet by mouth As Needed (edema, >3lb weight gain)., Disp: 30 tablet, Rfl: 11  •  montelukast (SINGULAIR) 10 MG tablet, Take 10 mg by mouth Every Night., Disp: , Rfl:   •  potassium chloride (K-DUR) 10 MEQ CR tablet, " Take 1 tablet by mouth As Needed (take with lasix)., Disp: 30 tablet, Rfl: 11  •  spironolactone (ALDACTONE) 25 MG tablet, Take 1 tablet by mouth Daily., Disp: 30 tablet, Rfl: 11  •  valsartan (DIOVAN) 40 MG tablet, Take 1 tablet by mouth Every Night., Disp: 90 tablet, Rfl: 3          ROS  Review of Systems   Cardiovascular: Negative for chest pain, dyspnea on exertion, irregular heartbeat, leg swelling and palpitations.   Respiratory: Negative for shortness of breath and wheezing.          SOCIAL HX  Social History     Socioeconomic History   • Marital status:      Spouse name: Not on file   • Number of children: Not on file   • Years of education: Not on file   • Highest education level: Not on file   Tobacco Use   • Smoking status: Never Smoker   • Smokeless tobacco: Never Used   Substance and Sexual Activity   • Alcohol use: Never     Frequency: Never   • Drug use: Never   • Sexual activity: Defer   Social History Narrative    caffeine use: drinks decaf, occassionally drinks coke cola       FAMILY HX  Family History   Problem Relation Age of Onset   • Heart attack Father    • Ovarian cancer Neg Hx    • Breast cancer Neg Hx              Bobby Peralta III, MD, FACC

## 2021-04-07 ENCOUNTER — OFFICE VISIT (OUTPATIENT)
Dept: CARDIOLOGY | Facility: CLINIC | Age: 71
End: 2021-04-07

## 2021-04-07 VITALS
SYSTOLIC BLOOD PRESSURE: 144 MMHG | BODY MASS INDEX: 28.66 KG/M2 | HEIGHT: 61 IN | WEIGHT: 151.8 LBS | DIASTOLIC BLOOD PRESSURE: 82 MMHG | HEART RATE: 75 BPM | OXYGEN SATURATION: 100 %

## 2021-04-07 DIAGNOSIS — N17.9 AKI (ACUTE KIDNEY INJURY) (HCC): ICD-10-CM

## 2021-04-07 DIAGNOSIS — I50.22 CHRONIC SYSTOLIC HEART FAILURE (HCC): Primary | ICD-10-CM

## 2021-04-07 DIAGNOSIS — I44.7 LBBB (LEFT BUNDLE BRANCH BLOCK): ICD-10-CM

## 2021-04-07 PROCEDURE — 99213 OFFICE O/P EST LOW 20 MIN: CPT | Performed by: INTERNAL MEDICINE

## 2021-04-07 PROCEDURE — 93284 PRGRMG EVAL IMPLANTABLE DFB: CPT | Performed by: INTERNAL MEDICINE

## 2021-04-07 RX ORDER — VENLAFAXINE HYDROCHLORIDE 75 MG/1
1 CAPSULE, EXTENDED RELEASE ORAL DAILY
COMMUNITY
Start: 2021-03-16

## 2021-04-07 RX ORDER — FLUTICASONE PROPIONATE 50 MCG
2 SPRAY, SUSPENSION (ML) NASAL DAILY PRN
COMMUNITY
Start: 2021-03-22

## 2021-04-07 NOTE — PROGRESS NOTES
Valeria Kanu  1950  [phone]    04/07/2021    [facility] CARDIOLOGY     Referring Provider: No ref. provider found     Lor Pulliam, APRN  [address]  [facility] [address]    Chief Complaint   Patient presents with   • Chronic systolic heart failure       Problem List:     1)Chronic Systolic Heart Failure; NICM      A) Echo 7/11/2020 Left ventricular systolic function is severely decreased.EF 20%. moderately dilated LV .  moderately dilated LA Mild MR     B) Echo 10/15/2020: EF 29%, moderately decreased LV function.  mild LV dilation,      C)7/13/20 - [facility] : LM normal, LAD normal, LCX no obstructive disease, RCA no obstructive   disease       D) Echo, 10/15/20, EF 29%. LV mildly dilated.       E) Implantation of BiV ICD ([facility]) 12/11/2020  2) LBBB  3)Anemia   4)Chronic Kidney Disease     Allergies  Allergies   Allergen Reactions   • Codeine Nausea And Vomiting       Current Medications    Current Outpatient Medications:   •  carvedilol (COREG) 12.5 MG tablet, Take 1 tablet by mouth 2 (Two) Times a Day., Disp: 180 tablet, Rfl: 3  •  cetirizine (zyrTEC) 10 MG tablet, Take 10 mg by mouth As Needed., Disp: , Rfl:   •  fluticasone (FLONASE) 50 MCG/ACT nasal spray, 2 sprays into the nostril(s) as directed by provider As Needed., Disp: , Rfl:   •  furosemide (LASIX) 20 MG tablet, Take 1 tablet by mouth As Needed (edema, >3lb weight gain)., Disp: 30 tablet, Rfl: 11  •  potassium chloride (K-DUR) 10 MEQ CR tablet, Take 1 tablet by mouth As Needed (take with lasix)., Disp: 30 tablet, Rfl: 11  •  spironolactone (ALDACTONE) 25 MG tablet, Take 1 tablet by mouth Daily., Disp: 90 tablet, Rfl: 3  •  valsartan (DIOVAN) 40 MG tablet, Take 1 tablet by mouth Every Night., Disp: 90 tablet, Rfl: 3  •  venlafaxine XR (EFFEXOR-XR) 75 MG 24 hr capsule, Take 1 capsule by mouth Daily., Disp: , Rfl:     History of Present Illness     Pt presents for follow up of  Chronic Systolic HF, LBBB, and [facility] ICD  "check. Since we last saw the pt, pt denies any palps, CP, LH, and dizziness. No ICD shocks. Denies any worsening SOB or fatigue. No edema. She admits to experiencing double vision/blurred vision since January when Dr. Peralta increased her coreg. She has been evaluated by her eye doctor, who feels is r/t to medications. Denies any hospitalizations, ER visits, bleeding issues, or TIA/CVA symptoms. Patient did well with 2 doses of pfizer. Overall feels well other than visual disturbances. BP controlled at home.     ROS:  General:  Denies fatigue, weight gain or loss +blurred vision, double vision.   Cardiovascular:  Denies CP, PND, syncope, near syncope, edema or palpitations.  Pulmonary:  + JARVIS,- cough, or wheezing      Vitals:    04/07/21 1046   BP: 144/82   BP Location: Right arm   Patient Position: Sitting   Pulse: 75   SpO2: 100%   Weight: 68.9 kg (151 lb 12.8 oz)   Height: 154.9 cm (61\")     Body mass index is 28.68 kg/m².  PE:  General: NAD  Neck: no JVD, no carotid bruits, no TM  Heart RRR, NL S1, S2, no rubs, murmurs  Lungs: CTA, no wheezes, rhonchi, or rales  Abd: soft, non-tender, NL BS  Ext: No musculoskeletal deformities, no edema, cyanosis, or clubbing  Psych: normal mood and affect    Diagnostic Data:  Manual Device Interrogation: SJM BiV ICD, DDDR 60 bpm, RA paced 7.3%, RV paced 92%, LV paced 92%. 94% on battery. Underlying rhythm AS BP.     Procedures    1. Chronic systolic heart failure (CMS/[facility])    2. LBBB (left bundle branch block)    3. ANA (acute kidney injury) (CMS/[facility])          Plan:    1) Chronic Systolic Heart Failure: Class I HF now  -s/p BiV ICD implantation with Dr. Biswas 12/11/20. Device with normal function today, LV output set to 3.25v autocapture off. Set sensed AV delay to 100.   - Echo, 10/15/20, EF 29%. LV mildly dilated.   -Patient having some visual disturbances since starting on Coreg 12.5 twice daily in January. Will decrease back to once daily. Patient to monitor her symptoms " and her BP at home. Has apt with Dr. Peralta in 1 month.   -Continue on home doses of spironolactone and valsartan.   2) LBBB  3)Chronic Kidney Disease  - Cr 12/2020 1.02     Scribed for Jeremiah Biswas MD by GINGER Oates. 4/7/2021 11:10 EDT     I, Jeremiah Biswas MD, personally performed the services described in this documentation as scribed by the above named individual in my presence, and it is both accurate and complete.  4/7/2021  11:22 EDT

## 2021-04-21 RX ORDER — METOPROLOL SUCCINATE 50 MG/1
50 TABLET, EXTENDED RELEASE ORAL
Qty: 30 TABLET | Refills: 5 | Status: SHIPPED | OUTPATIENT
Start: 2021-04-21 | End: 2021-09-24 | Stop reason: SDUPTHER

## 2021-04-21 NOTE — TELEPHONE ENCOUNTER
States one of her medications is causing her to have double vision. Having trouble driving and watching TV. Went to her eye doctor and her vision test was normal.  decreased her carvedilol to once daily. This helped only a bit. Still having blurred and double vision. She wants to know what medication is causing this to happen ? Please advise.

## 2021-04-24 PROCEDURE — 93296 REM INTERROG EVL PM/IDS: CPT | Performed by: INTERNAL MEDICINE

## 2021-04-24 PROCEDURE — 93295 DEV INTERROG REMOTE 1/2/MLT: CPT | Performed by: INTERNAL MEDICINE

## 2021-07-08 RX ORDER — SPIRONOLACTONE 25 MG/1
TABLET ORAL
Qty: 30 TABLET | Refills: 3 | Status: SHIPPED | OUTPATIENT
Start: 2021-07-08 | End: 2021-09-24 | Stop reason: SDUPTHER

## 2021-07-24 PROCEDURE — 93296 REM INTERROG EVL PM/IDS: CPT | Performed by: INTERNAL MEDICINE

## 2021-07-24 PROCEDURE — 93295 DEV INTERROG REMOTE 1/2/MLT: CPT | Performed by: INTERNAL MEDICINE

## 2021-07-27 ENCOUNTER — APPOINTMENT (OUTPATIENT)
Dept: CT IMAGING | Facility: HOSPITAL | Age: 71
End: 2021-07-27

## 2021-07-27 ENCOUNTER — HOSPITAL ENCOUNTER (OUTPATIENT)
Facility: HOSPITAL | Age: 71
Setting detail: OBSERVATION
Discharge: HOME | End: 2021-07-28
Attending: EMERGENCY MEDICINE

## 2021-07-27 DIAGNOSIS — N39.0 URINARY TRACT INFECTION WITHOUT HEMATURIA, SITE UNSPECIFIED: ICD-10-CM

## 2021-07-27 DIAGNOSIS — D72.829 LEUKOCYTOSIS, UNSPECIFIED TYPE: ICD-10-CM

## 2021-07-27 DIAGNOSIS — M25.551 RIGHT HIP PAIN: Primary | ICD-10-CM

## 2021-07-27 LAB
ALBUMIN SERPL-MCNC: 4.3 G/DL (ref 3.5–5.2)
ALBUMIN/GLOB SERPL: 1.5 G/DL
ALP SERPL-CCNC: 117 U/L (ref 39–117)
ALT SERPL W P-5'-P-CCNC: 23 U/L (ref 1–33)
ANION GAP SERPL CALCULATED.3IONS-SCNC: 14 MMOL/L (ref 5–15)
AST SERPL-CCNC: 17 U/L (ref 1–32)
BACTERIA UR QL AUTO: ABNORMAL /HPF
BASOPHILS # BLD AUTO: 0.03 10*3/MM3 (ref 0–0.2)
BASOPHILS NFR BLD AUTO: 0.2 % (ref 0–1.5)
BILIRUB SERPL-MCNC: 0.3 MG/DL (ref 0–1.2)
BILIRUB UR QL STRIP: NEGATIVE
BUN SERPL-MCNC: 36 MG/DL (ref 8–23)
BUN/CREAT SERPL: 28.6 (ref 7–25)
CALCIUM SPEC-SCNC: 10.2 MG/DL (ref 8.6–10.5)
CHLORIDE SERPL-SCNC: 102 MMOL/L (ref 98–107)
CLARITY UR: ABNORMAL
CO2 SERPL-SCNC: 20 MMOL/L (ref 22–29)
COLOR UR: YELLOW
CREAT SERPL-MCNC: 1.26 MG/DL (ref 0.57–1)
CRP SERPL-MCNC: 0.44 MG/DL (ref 0–0.5)
D-LACTATE SERPL-SCNC: 2 MMOL/L (ref 0.5–2)
DEPRECATED RDW RBC AUTO: 53.5 FL (ref 37–54)
EOSINOPHIL # BLD AUTO: 0.05 10*3/MM3 (ref 0–0.4)
EOSINOPHIL NFR BLD AUTO: 0.3 % (ref 0.3–6.2)
ERYTHROCYTE [DISTWIDTH] IN BLOOD BY AUTOMATED COUNT: 16.7 % (ref 12.3–15.4)
ERYTHROCYTE [SEDIMENTATION RATE] IN BLOOD: 26 MM/HR (ref 0–30)
GFR SERPL CREATININE-BSD FRML MDRD: 42 ML/MIN/1.73
GLOBULIN UR ELPH-MCNC: 2.9 GM/DL
GLUCOSE SERPL-MCNC: 90 MG/DL (ref 65–99)
GLUCOSE UR STRIP-MCNC: NEGATIVE MG/DL
HCT VFR BLD AUTO: 38.2 % (ref 34–46.6)
HGB BLD-MCNC: 12.1 G/DL (ref 12–15.9)
HGB UR QL STRIP.AUTO: NEGATIVE
HYALINE CASTS UR QL AUTO: ABNORMAL /LPF
IMM GRANULOCYTES # BLD AUTO: 0.34 10*3/MM3 (ref 0–0.05)
IMM GRANULOCYTES NFR BLD AUTO: 1.9 % (ref 0–0.5)
KETONES UR QL STRIP: NEGATIVE
LEUKOCYTE ESTERASE UR QL STRIP.AUTO: NEGATIVE
LYMPHOCYTES # BLD AUTO: 3.62 10*3/MM3 (ref 0.7–3.1)
LYMPHOCYTES NFR BLD AUTO: 20.4 % (ref 19.6–45.3)
MCH RBC QN AUTO: 27.9 PG (ref 26.6–33)
MCHC RBC AUTO-ENTMCNC: 31.7 G/DL (ref 31.5–35.7)
MCV RBC AUTO: 88 FL (ref 79–97)
MONOCYTES # BLD AUTO: 1.55 10*3/MM3 (ref 0.1–0.9)
MONOCYTES NFR BLD AUTO: 8.8 % (ref 5–12)
NEUTROPHILS NFR BLD AUTO: 12.12 10*3/MM3 (ref 1.7–7)
NEUTROPHILS NFR BLD AUTO: 68.4 % (ref 42.7–76)
NITRITE UR QL STRIP: POSITIVE
NRBC BLD AUTO-RTO: 0 /100 WBC (ref 0–0.2)
PH UR STRIP.AUTO: 7 [PH] (ref 5–8)
PLATELET # BLD AUTO: 513 10*3/MM3 (ref 140–450)
PMV BLD AUTO: 10 FL (ref 6–12)
POTASSIUM SERPL-SCNC: 5 MMOL/L (ref 3.5–5.2)
PROT SERPL-MCNC: 7.2 G/DL (ref 6–8.5)
PROT UR QL STRIP: NEGATIVE
RBC # BLD AUTO: 4.34 10*6/MM3 (ref 3.77–5.28)
RBC # UR: ABNORMAL /HPF
REF LAB TEST METHOD: ABNORMAL
SODIUM SERPL-SCNC: 136 MMOL/L (ref 136–145)
SP GR UR STRIP: 1.02 (ref 1–1.03)
SQUAMOUS #/AREA URNS HPF: ABNORMAL /HPF
UROBILINOGEN UR QL STRIP: ABNORMAL
WBC # BLD AUTO: 17.71 10*3/MM3 (ref 3.4–10.8)
WBC UR QL AUTO: ABNORMAL /HPF

## 2021-07-27 PROCEDURE — 25010000002 KETOROLAC TROMETHAMINE PER 15 MG: Performed by: PHYSICIAN ASSISTANT

## 2021-07-27 PROCEDURE — 96375 TX/PRO/DX INJ NEW DRUG ADDON: CPT

## 2021-07-27 PROCEDURE — 25010000002 ONDANSETRON PER 1 MG: Performed by: PHYSICIAN ASSISTANT

## 2021-07-27 PROCEDURE — 25010000002 HYDROMORPHONE PER 4 MG: Performed by: EMERGENCY MEDICINE

## 2021-07-27 PROCEDURE — 96376 TX/PRO/DX INJ SAME DRUG ADON: CPT

## 2021-07-27 PROCEDURE — 99284 EMERGENCY DEPT VISIT MOD MDM: CPT

## 2021-07-27 PROCEDURE — 25010000002 CEFTRIAXONE PER 250 MG: Performed by: PHYSICIAN ASSISTANT

## 2021-07-27 PROCEDURE — 85652 RBC SED RATE AUTOMATED: CPT | Performed by: PHYSICIAN ASSISTANT

## 2021-07-27 PROCEDURE — G0378 HOSPITAL OBSERVATION PER HR: HCPCS

## 2021-07-27 PROCEDURE — 80053 COMPREHEN METABOLIC PANEL: CPT | Performed by: PHYSICIAN ASSISTANT

## 2021-07-27 PROCEDURE — 99220 PR INITIAL OBSERVATION CARE/DAY 70 MINUTES: CPT | Performed by: INTERNAL MEDICINE

## 2021-07-27 PROCEDURE — 74176 CT ABD & PELVIS W/O CONTRAST: CPT

## 2021-07-27 PROCEDURE — 25010000002 DIPHENHYDRAMINE PER 50 MG: Performed by: PHYSICIAN ASSISTANT

## 2021-07-27 PROCEDURE — 86140 C-REACTIVE PROTEIN: CPT | Performed by: PHYSICIAN ASSISTANT

## 2021-07-27 PROCEDURE — P9612 CATHETERIZE FOR URINE SPEC: HCPCS

## 2021-07-27 PROCEDURE — 96365 THER/PROPH/DIAG IV INF INIT: CPT

## 2021-07-27 PROCEDURE — 83605 ASSAY OF LACTIC ACID: CPT | Performed by: PHYSICIAN ASSISTANT

## 2021-07-27 PROCEDURE — 85025 COMPLETE CBC W/AUTO DIFF WBC: CPT | Performed by: PHYSICIAN ASSISTANT

## 2021-07-27 PROCEDURE — 81001 URINALYSIS AUTO W/SCOPE: CPT | Performed by: PHYSICIAN ASSISTANT

## 2021-07-27 PROCEDURE — 25010000002 PROCHLORPERAZINE 10 MG/2ML SOLUTION: Performed by: PHYSICIAN ASSISTANT

## 2021-07-27 RX ORDER — ONDANSETRON 2 MG/ML
4 INJECTION INTRAMUSCULAR; INTRAVENOUS ONCE
Status: COMPLETED | OUTPATIENT
Start: 2021-07-27 | End: 2021-07-27

## 2021-07-27 RX ORDER — ACETAMINOPHEN 650 MG/1
650 SUPPOSITORY RECTAL EVERY 4 HOURS PRN
Status: DISCONTINUED | OUTPATIENT
Start: 2021-07-27 | End: 2021-07-28 | Stop reason: HOSPADM

## 2021-07-27 RX ORDER — SODIUM CHLORIDE 0.9 % (FLUSH) 0.9 %
10 SYRINGE (ML) INJECTION EVERY 12 HOURS SCHEDULED
Status: DISCONTINUED | OUTPATIENT
Start: 2021-07-27 | End: 2021-07-28 | Stop reason: HOSPADM

## 2021-07-27 RX ORDER — ACETAMINOPHEN 160 MG/5ML
650 SOLUTION ORAL EVERY 4 HOURS PRN
Status: DISCONTINUED | OUTPATIENT
Start: 2021-07-27 | End: 2021-07-28 | Stop reason: HOSPADM

## 2021-07-27 RX ORDER — METOPROLOL SUCCINATE 50 MG/1
50 TABLET, EXTENDED RELEASE ORAL NIGHTLY
Status: DISCONTINUED | OUTPATIENT
Start: 2021-07-27 | End: 2021-07-28 | Stop reason: HOSPADM

## 2021-07-27 RX ORDER — SODIUM CHLORIDE 0.9 % (FLUSH) 0.9 %
10 SYRINGE (ML) INJECTION AS NEEDED
Status: DISCONTINUED | OUTPATIENT
Start: 2021-07-27 | End: 2021-07-28 | Stop reason: HOSPADM

## 2021-07-27 RX ORDER — HYDROMORPHONE HYDROCHLORIDE 1 MG/ML
0.25 INJECTION, SOLUTION INTRAMUSCULAR; INTRAVENOUS; SUBCUTANEOUS ONCE
Status: COMPLETED | OUTPATIENT
Start: 2021-07-27 | End: 2021-07-27

## 2021-07-27 RX ORDER — DIPHENHYDRAMINE HYDROCHLORIDE 50 MG/ML
25 INJECTION INTRAMUSCULAR; INTRAVENOUS ONCE
Status: COMPLETED | OUTPATIENT
Start: 2021-07-27 | End: 2021-07-27

## 2021-07-27 RX ORDER — ACETAMINOPHEN 325 MG/1
650 TABLET ORAL EVERY 4 HOURS PRN
Status: DISCONTINUED | OUTPATIENT
Start: 2021-07-27 | End: 2021-07-28 | Stop reason: HOSPADM

## 2021-07-27 RX ORDER — VENLAFAXINE HYDROCHLORIDE 75 MG/1
75 CAPSULE, EXTENDED RELEASE ORAL DAILY
Status: DISCONTINUED | OUTPATIENT
Start: 2021-07-28 | End: 2021-07-28 | Stop reason: HOSPADM

## 2021-07-27 RX ORDER — LIDOCAINE 50 MG/G
1 PATCH TOPICAL
Status: DISCONTINUED | OUTPATIENT
Start: 2021-07-28 | End: 2021-07-28 | Stop reason: HOSPADM

## 2021-07-27 RX ORDER — SODIUM CHLORIDE 9 MG/ML
100 INJECTION, SOLUTION INTRAVENOUS CONTINUOUS
Status: ACTIVE | OUTPATIENT
Start: 2021-07-27 | End: 2021-07-28

## 2021-07-27 RX ORDER — KETOROLAC TROMETHAMINE 15 MG/ML
15 INJECTION, SOLUTION INTRAMUSCULAR; INTRAVENOUS ONCE
Status: COMPLETED | OUTPATIENT
Start: 2021-07-27 | End: 2021-07-27

## 2021-07-27 RX ORDER — CEFTRIAXONE SODIUM 1 G/50ML
1 INJECTION, SOLUTION INTRAVENOUS EVERY 24 HOURS
Status: DISCONTINUED | OUTPATIENT
Start: 2021-07-28 | End: 2021-07-28

## 2021-07-27 RX ORDER — DEXAMETHASONE SODIUM PHOSPHATE 4 MG/ML
4 INJECTION, SOLUTION INTRA-ARTICULAR; INTRALESIONAL; INTRAMUSCULAR; INTRAVENOUS; SOFT TISSUE EVERY 6 HOURS PRN
Status: DISCONTINUED | OUTPATIENT
Start: 2021-07-27 | End: 2021-07-28 | Stop reason: HOSPADM

## 2021-07-27 RX ORDER — PROCHLORPERAZINE EDISYLATE 5 MG/ML
2.5 INJECTION INTRAMUSCULAR; INTRAVENOUS EVERY 6 HOURS PRN
Status: DISCONTINUED | OUTPATIENT
Start: 2021-07-27 | End: 2021-07-28 | Stop reason: HOSPADM

## 2021-07-27 RX ORDER — CEFTRIAXONE SODIUM 1 G/50ML
1 INJECTION, SOLUTION INTRAVENOUS ONCE
Status: COMPLETED | OUTPATIENT
Start: 2021-07-27 | End: 2021-07-27

## 2021-07-27 RX ADMIN — KETOROLAC TROMETHAMINE 15 MG: 15 INJECTION, SOLUTION INTRAMUSCULAR; INTRAVENOUS at 11:03

## 2021-07-27 RX ADMIN — DIPHENHYDRAMINE HYDROCHLORIDE 25 MG: 50 INJECTION INTRAMUSCULAR; INTRAVENOUS at 13:37

## 2021-07-27 RX ADMIN — HYDROMORPHONE HYDROCHLORIDE 0.25 MG: 1 INJECTION, SOLUTION INTRAMUSCULAR; INTRAVENOUS; SUBCUTANEOUS at 11:04

## 2021-07-27 RX ADMIN — ONDANSETRON 4 MG: 2 INJECTION INTRAMUSCULAR; INTRAVENOUS at 12:45

## 2021-07-27 RX ADMIN — ONDANSETRON 4 MG: 2 INJECTION INTRAMUSCULAR; INTRAVENOUS at 11:03

## 2021-07-27 RX ADMIN — SODIUM CHLORIDE, PRESERVATIVE FREE 10 ML: 5 INJECTION INTRAVENOUS at 21:14

## 2021-07-27 RX ADMIN — PROCHLORPERAZINE EDISYLATE 2.5 MG: 5 INJECTION INTRAMUSCULAR; INTRAVENOUS at 13:37

## 2021-07-27 RX ADMIN — CEFTRIAXONE SODIUM 1 G: 1 INJECTION, SOLUTION INTRAVENOUS at 17:04

## 2021-07-27 RX ADMIN — METOPROLOL SUCCINATE 50 MG: 50 TABLET, EXTENDED RELEASE ORAL at 21:07

## 2021-07-27 RX ADMIN — SODIUM CHLORIDE 100 ML/HR: 9 INJECTION, SOLUTION INTRAVENOUS at 21:14

## 2021-07-28 VITALS
BODY MASS INDEX: 29.98 KG/M2 | HEIGHT: 61 IN | OXYGEN SATURATION: 98 % | TEMPERATURE: 97.6 F | DIASTOLIC BLOOD PRESSURE: 69 MMHG | HEART RATE: 68 BPM | WEIGHT: 158.8 LBS | SYSTOLIC BLOOD PRESSURE: 108 MMHG | RESPIRATION RATE: 16 BRPM

## 2021-07-28 PROBLEM — M53.3: Status: ACTIVE | Noted: 2021-07-28

## 2021-07-28 PROBLEM — R79.89 ELEVATED SERUM CREATININE: Status: RESOLVED | Noted: 2020-07-10 | Resolved: 2021-07-28

## 2021-07-28 LAB
ANION GAP SERPL CALCULATED.3IONS-SCNC: 11 MMOL/L (ref 5–15)
BASOPHILS # BLD AUTO: 0.04 10*3/MM3 (ref 0–0.2)
BASOPHILS NFR BLD AUTO: 0.3 % (ref 0–1.5)
BUN SERPL-MCNC: 28 MG/DL (ref 8–23)
BUN/CREAT SERPL: 25 (ref 7–25)
CALCIUM SPEC-SCNC: 10.1 MG/DL (ref 8.6–10.5)
CHLORIDE SERPL-SCNC: 104 MMOL/L (ref 98–107)
CO2 SERPL-SCNC: 22 MMOL/L (ref 22–29)
CREAT SERPL-MCNC: 1.12 MG/DL (ref 0.57–1)
DEPRECATED RDW RBC AUTO: 54.7 FL (ref 37–54)
EOSINOPHIL # BLD AUTO: 0.34 10*3/MM3 (ref 0–0.4)
EOSINOPHIL NFR BLD AUTO: 2.4 % (ref 0.3–6.2)
ERYTHROCYTE [DISTWIDTH] IN BLOOD BY AUTOMATED COUNT: 16.8 % (ref 12.3–15.4)
FLUAV RNA RESP QL NAA+PROBE: NOT DETECTED
FLUBV RNA RESP QL NAA+PROBE: NOT DETECTED
GFR SERPL CREATININE-BSD FRML MDRD: 48 ML/MIN/1.73
GLUCOSE SERPL-MCNC: 115 MG/DL (ref 65–99)
HCT VFR BLD AUTO: 37.9 % (ref 34–46.6)
HGB BLD-MCNC: 11.7 G/DL (ref 12–15.9)
IMM GRANULOCYTES # BLD AUTO: 0.22 10*3/MM3 (ref 0–0.05)
IMM GRANULOCYTES NFR BLD AUTO: 1.5 % (ref 0–0.5)
LYMPHOCYTES # BLD AUTO: 2.83 10*3/MM3 (ref 0.7–3.1)
LYMPHOCYTES NFR BLD AUTO: 19.7 % (ref 19.6–45.3)
MCH RBC QN AUTO: 27.9 PG (ref 26.6–33)
MCHC RBC AUTO-ENTMCNC: 30.9 G/DL (ref 31.5–35.7)
MCV RBC AUTO: 90.5 FL (ref 79–97)
MONOCYTES # BLD AUTO: 1 10*3/MM3 (ref 0.1–0.9)
MONOCYTES NFR BLD AUTO: 6.9 % (ref 5–12)
NEUTROPHILS NFR BLD AUTO: 69.2 % (ref 42.7–76)
NEUTROPHILS NFR BLD AUTO: 9.97 10*3/MM3 (ref 1.7–7)
NRBC BLD AUTO-RTO: 0 /100 WBC (ref 0–0.2)
PLATELET # BLD AUTO: 508 10*3/MM3 (ref 140–450)
PMV BLD AUTO: 9.6 FL (ref 6–12)
POTASSIUM SERPL-SCNC: 4.2 MMOL/L (ref 3.5–5.2)
RBC # BLD AUTO: 4.19 10*6/MM3 (ref 3.77–5.28)
SARS-COV-2 RNA RESP QL NAA+PROBE: NOT DETECTED
SODIUM SERPL-SCNC: 137 MMOL/L (ref 136–145)
WBC # BLD AUTO: 14.4 10*3/MM3 (ref 3.4–10.8)

## 2021-07-28 PROCEDURE — 87636 SARSCOV2 & INF A&B AMP PRB: CPT | Performed by: INTERNAL MEDICINE

## 2021-07-28 PROCEDURE — 85025 COMPLETE CBC W/AUTO DIFF WBC: CPT | Performed by: PHYSICIAN ASSISTANT

## 2021-07-28 PROCEDURE — 80048 BASIC METABOLIC PNL TOTAL CA: CPT | Performed by: PHYSICIAN ASSISTANT

## 2021-07-28 PROCEDURE — 99217 PR OBSERVATION CARE DISCHARGE MANAGEMENT: CPT | Performed by: FAMILY MEDICINE

## 2021-07-28 PROCEDURE — G0378 HOSPITAL OBSERVATION PER HR: HCPCS

## 2021-07-28 PROCEDURE — 97165 OT EVAL LOW COMPLEX 30 MIN: CPT

## 2021-07-28 PROCEDURE — 97116 GAIT TRAINING THERAPY: CPT

## 2021-07-28 RX ORDER — CEFUROXIME AXETIL 500 MG/1
500 TABLET ORAL EVERY 12 HOURS SCHEDULED
Qty: 3 TABLET | Refills: 0 | Status: SHIPPED | OUTPATIENT
Start: 2021-07-28 | End: 2021-07-30

## 2021-07-28 RX ORDER — CEFUROXIME AXETIL 250 MG/1
500 TABLET ORAL EVERY 12 HOURS SCHEDULED
Status: DISCONTINUED | OUTPATIENT
Start: 2021-07-28 | End: 2021-07-28 | Stop reason: HOSPADM

## 2021-07-28 RX ORDER — LIDOCAINE 50 MG/G
1 PATCH TOPICAL
Qty: 15 EACH | Refills: 1 | Status: SHIPPED | OUTPATIENT
Start: 2021-07-29 | End: 2021-09-24

## 2021-07-28 RX ORDER — METHYLPREDNISOLONE 4 MG/1
TABLET ORAL
Qty: 1 EACH | Refills: 0 | Status: SHIPPED | OUTPATIENT
Start: 2021-07-28 | End: 2021-09-24

## 2021-07-28 RX ADMIN — SODIUM CHLORIDE, PRESERVATIVE FREE 10 ML: 5 INJECTION INTRAVENOUS at 09:12

## 2021-07-28 RX ADMIN — CEFUROXIME AXETIL 500 MG: 250 TABLET ORAL at 14:17

## 2021-07-28 RX ADMIN — ACETAMINOPHEN 650 MG: 325 TABLET ORAL at 14:17

## 2021-07-28 RX ADMIN — ACETAMINOPHEN 650 MG: 325 TABLET ORAL at 09:12

## 2021-07-28 RX ADMIN — VENLAFAXINE HYDROCHLORIDE 75 MG: 75 CAPSULE, EXTENDED RELEASE ORAL at 09:12

## 2021-07-28 RX ADMIN — LIDOCAINE 1 PATCH: 50 PATCH CUTANEOUS at 09:12

## 2021-07-29 ENCOUNTER — READMISSION MANAGEMENT (OUTPATIENT)
Dept: CALL CENTER | Facility: HOSPITAL | Age: 71
End: 2021-07-29

## 2021-07-29 RX ORDER — VALSARTAN 40 MG/1
TABLET ORAL
Qty: 90 TABLET | Refills: 0 | Status: SHIPPED | OUTPATIENT
Start: 2021-07-29 | End: 2021-09-13 | Stop reason: SDUPTHER

## 2021-07-29 NOTE — OUTREACH NOTE
Prep Survey      Responses   [facility] facility patient discharged from?  [place]   Is LACE score < 7 ?  Yes   Emergency Room discharge w/ pulse ox?  No   Eligibility  Readm Mgmt   Discharge diagnosis  CHF   Does the patient have one of the following disease processes/diagnoses(primary or secondary)?  CHF   Prep survey completed?  Yes          Elizabeth Benson RN

## 2021-08-02 ENCOUNTER — READMISSION MANAGEMENT (OUTPATIENT)
Dept: CALL CENTER | Facility: HOSPITAL | Age: 71
End: 2021-08-02

## 2021-08-02 NOTE — OUTREACH NOTE
CHF Week 1 Survey      Responses   [facility] patient discharged from?  [place]   Does the patient have one of the following disease processes/diagnoses(primary or secondary)?  CHF   CHF Week 1 attempt successful?  No   Unsuccessful attempts  Attempt 1          Zarina Salvador RN

## 2021-08-06 ENCOUNTER — READMISSION MANAGEMENT (OUTPATIENT)
Dept: CALL CENTER | Facility: HOSPITAL | Age: 71
End: 2021-08-06

## 2021-08-06 NOTE — OUTREACH NOTE
CHF Week 1 Survey      Responses   [facility] patient discharged from?  [place]   Does the patient have one of the following disease processes/diagnoses(primary or secondary)?  CHF   CHF Week 1 attempt successful?  Yes   Call start time  0812   Call end time  0816   Discharge diagnosis  CHF   Is patient permission given to speak with other caregiver?  No   Meds reviewed with patient/caregiver?  Yes   Is the patient having any side effects they believe may be caused by any medication additions or changes?  No   Does the patient have all medications ordered at discharge?  Yes   Is the patient taking all medications as directed (includes completed medication regime)?  Yes   Does the patient have a primary care provider?   Yes   Does the patient have an appointment with their PCP within 7 days of discharge?  No [waiting to see PCP until can walk better. Having hip pain. ]   What is preventing the patient from scheduling follow up appointments within 7 days of discharge?  Haven't had time   Nursing Interventions  Advised patient to make appointment   Has the patient kept scheduled appointments due by today?  N/A   Comments  She has appt to see Cardiac Md.    Has home health visited the patient within 72 hours of discharge?  N/A   Pulse Ox monitoring  None   Psychosocial issues?  No   Did the patient receive a copy of their discharge instructions?  Yes   What is the patient's perception of their health status since discharge?  Improving [Her hip pain is getting better. Reports no swelling or weight gain.]   Nursing interventions  Nurse provided patient education   Is the patient weighing daily?  Yes   Does the patient have scales?  Yes   Daily weight interventions  Education provided on importance of daily weight   Is the patient able to teach back Heart Failure diet management?  Yes   Is the patient able to teach back Heart Failure Zones?  Yes   Is the patient able to teach back signs and symptoms of worsening  condition? (i.e. weight gain, shortness of air, etc.)  Yes   If the patient is a current smoker, are they able to teach back resources for cessation?  Not a smoker   Is the patient/caregiver able to teach back the hierarchy of who to call/visit for symptoms/problems? PCP, Specialist, Home health nurse, Urgent Care, ED, 911  Yes    CHF Week 1 call completed?  Yes   Graduated/Revoked comments  Chf is doing well, hip is improving.           Tata Montez RN

## 2021-09-13 RX ORDER — VALSARTAN 40 MG/1
40 TABLET ORAL NIGHTLY
Qty: 90 TABLET | Refills: 0 | Status: SHIPPED | OUTPATIENT
Start: 2021-09-13 | End: 2021-09-24 | Stop reason: SDUPTHER

## 2021-09-24 ENCOUNTER — OFFICE VISIT (OUTPATIENT)
Dept: CARDIOLOGY | Facility: CLINIC | Age: 71
End: 2021-09-24

## 2021-09-24 VITALS
HEART RATE: 60 BPM | OXYGEN SATURATION: 97 % | DIASTOLIC BLOOD PRESSURE: 62 MMHG | SYSTOLIC BLOOD PRESSURE: 118 MMHG | WEIGHT: 154.8 LBS | HEIGHT: 61 IN | BODY MASS INDEX: 29.23 KG/M2

## 2021-09-24 DIAGNOSIS — I50.22 CHRONIC SYSTOLIC HEART FAILURE (HCC): Primary | ICD-10-CM

## 2021-09-24 DIAGNOSIS — I44.7 LBBB (LEFT BUNDLE BRANCH BLOCK): ICD-10-CM

## 2021-09-24 PROCEDURE — 99213 OFFICE O/P EST LOW 20 MIN: CPT | Performed by: INTERNAL MEDICINE

## 2021-09-24 RX ORDER — VALSARTAN 40 MG/1
40 TABLET ORAL NIGHTLY
Qty: 90 TABLET | Refills: 3 | Status: SHIPPED | OUTPATIENT
Start: 2021-09-24 | End: 2022-04-18 | Stop reason: SDUPTHER

## 2021-09-24 RX ORDER — SPIRONOLACTONE 25 MG/1
25 TABLET ORAL DAILY
Qty: 90 TABLET | Refills: 3 | Status: SHIPPED | OUTPATIENT
Start: 2021-09-24 | End: 2021-12-10

## 2021-09-24 RX ORDER — METOPROLOL SUCCINATE 50 MG/1
50 TABLET, EXTENDED RELEASE ORAL
Qty: 90 TABLET | Refills: 3 | Status: SHIPPED | OUTPATIENT
Start: 2021-09-24 | End: 2021-10-29 | Stop reason: SDUPTHER

## 2021-09-24 NOTE — PROGRESS NOTES
[facility]  Office visit  Valeria Bobby  1950    There is no work phone number on file.    VISIT DATE:  9/24/2021    PCP: Lor Pulliam, APRN  [address]  [facility] [address]    CC:  Chief Complaint   Patient presents with   • Cardiomyopathy       Previous cardiac studies and procedures:  July 2020  Echo  · Left ventricular systolic function is severely decreased.  · Calculated EF = 20%. Estimated EF was in agreement with the calculated EF.  · The left ventricular cavity is moderately dilated.  · Left atrial cavity size is moderately dilated.  · Mild mitral valve regurgitation is present  Cardiac catheterization: No angiographically significant coronary disease.    August 2020 echo  · Left ventricular systolic function is severely decreased.  · Estimated EF appears to be in the range of 26 - 30%.    October 2020 echo  · Calculated left ventricular EF = 29% Estimated left ventricular EF was in agreement with the calculated left ventricular EF. Left ventricular systolic function is moderately decreased.  · The left ventricular cavity is mildly dilated.    December 2020: [facility] CRT-D    ASSESSMENT:   Diagnosis Plan   1. Chronic systolic heart failure (CMS/HCC)     2. LBBB (left bundle branch block)         PLAN:  Continue Toprol-XL 50 mg p.o. daily and spironolactone 25 mg p.o. daily, valsartan 40 mg p.o. at bedtime.  Previously intolerant to carvedilol due to hypotension.  Unlikely to tolerate switching to Entresto at this time.      Subjective  Improving functional capacity after previous mechanical fall pelvic fracture.   Denies dyspnea, chest pain or palpitations. Blood pressures running less than 120/80 mmHg, systolic blood pressures often running less than 110 mmHg.  She is very compliant with medical therapy.  Tolerated CRT-D implantation very well.    PHYSICAL EXAMINATION:  Vitals:    09/24/21 1051   BP: 118/62   BP Location: Left arm   Patient Position: Sitting  "  Pulse: 60   SpO2: 97%   Weight: 70.2 kg (154 lb 12.8 oz)   Height: 154.9 cm (61\")     General Appearance:    Alert, cooperative, no distress, appears stated age   Head:    Normocephalic, without obvious abnormality, atraumatic   Eyes:    conjunctiva/corneas clear   Nose:   Nares normal, septum midline, mucosa normal, no drainage   Throat:   Lips, teeth and gums normal   Neck:   Supple, symmetrical, trachea midline, no carotid    bruit or JVD   Lungs:     Clear to auscultation bilaterally, respirations unlabored   Chest Wall:    No tenderness or deformity    Heart:    Regular rate and rhythm, S1 and S2 normal, no murmur, rub   or gallop, normal carotid impulse bilaterally without bruit.   Abdomen:     Soft, non-tender   Extremities:   Extremities normal, atraumatic, no cyanosis or edema   Pulses:   2+ and symmetric all extremities   Skin:   Skin color, texture, turgor normal, no rashes or lesions       Diagnostic Data:  Procedures  No results found for: CHLPL, TRIG, HDL, LDLDIRECT  Lab Results   Component Value Date    GLUCOSE 115 (H) 07/28/2021    BUN 28 (H) 07/28/2021    CREATININE 1.12 (H) 07/28/2021     07/28/2021    K 4.2 07/28/2021     07/28/2021    CO2 22.0 07/28/2021     Lab Results   Component Value Date    HGBA1C 5.40 12/11/2020     Lab Results   Component Value Date    WBC 14.40 (H) 07/28/2021    HGB 11.7 (L) 07/28/2021    HCT 37.9 07/28/2021     (H) 07/28/2021       Allergies  Allergies   Allergen Reactions   • Codeine Nausea And Vomiting       Current Medications    Current Outpatient Medications:   •  cetirizine (zyrTEC) 10 MG tablet, Take 10 mg by mouth Daily As Needed for Allergies or Rhinitis. OTC, Disp: , Rfl:   •  fluticasone (FLONASE) 50 MCG/ACT nasal spray, 2 sprays into the nostril(s) as directed by provider Daily As Needed for Rhinitis or Allergies. OTC, Disp: , Rfl:   •  furosemide (LASIX) 20 MG tablet, Take 1 tablet by mouth As Needed (edema, >3lb weight gain)., Disp: 30 " tablet, Rfl: 11  •  metoprolol succinate XL (TOPROL-XL) 50 MG 24 hr tablet, Take 1 tablet by mouth every night at bedtime., Disp: 30 tablet, Rfl: 5  •  montelukast (SINGULAIR) 10 MG tablet, As Needed., Disp: , Rfl:   •  potassium chloride (K-DUR) 10 MEQ CR tablet, Take 1 tablet by mouth As Needed (take with lasix)., Disp: 30 tablet, Rfl: 11  •  spironolactone (ALDACTONE) 25 MG tablet, TAKE ONE (1) TABLET BY MOUTH DAILY., Disp: 30 tablet, Rfl: 3  •  valsartan (DIOVAN) 40 MG tablet, Take 1 tablet by mouth Every Night., Disp: 90 tablet, Rfl: 0  •  venlafaxine XR (EFFEXOR-XR) 75 MG 24 hr capsule, Take 1 capsule by mouth Daily., Disp: , Rfl:           ROS  Review of Systems   Cardiovascular: Negative for chest pain, dyspnea on exertion, irregular heartbeat, leg swelling and palpitations.   Respiratory: Negative for shortness of breath and wheezing.          SOCIAL HX  Social History     Socioeconomic History   • Marital status:      Spouse name: Not on file   • Number of children: Not on file   • Years of education: Not on file   • Highest education level: Not on file   Tobacco Use   • Smoking status: Never Smoker   • Smokeless tobacco: Never Used   Substance and Sexual Activity   • Alcohol use: Never   • Drug use: Never   • Sexual activity: Defer       FAMILY HX  Family History   Problem Relation Age of Onset   • Heart attack Father    • Ovarian cancer Neg Hx    • Breast cancer Neg Hx              Bobby Peralta III, MD, FACC

## 2021-10-23 PROCEDURE — 93296 REM INTERROG EVL PM/IDS: CPT | Performed by: INTERNAL MEDICINE

## 2021-10-23 PROCEDURE — 93295 DEV INTERROG REMOTE 1/2/MLT: CPT | Performed by: INTERNAL MEDICINE

## 2021-10-29 ENCOUNTER — OFFICE VISIT (OUTPATIENT)
Dept: CARDIOLOGY | Facility: CLINIC | Age: 71
End: 2021-10-29

## 2021-10-29 VITALS
HEART RATE: 86 BPM | BODY MASS INDEX: 29.45 KG/M2 | WEIGHT: 156 LBS | SYSTOLIC BLOOD PRESSURE: 118 MMHG | OXYGEN SATURATION: 96 % | HEIGHT: 61 IN | DIASTOLIC BLOOD PRESSURE: 66 MMHG

## 2021-10-29 DIAGNOSIS — I44.7 LBBB (LEFT BUNDLE BRANCH BLOCK): Primary | ICD-10-CM

## 2021-10-29 DIAGNOSIS — I47.1 PAROXYSMAL SVT (SUPRAVENTRICULAR TACHYCARDIA) (HCC): ICD-10-CM

## 2021-10-29 DIAGNOSIS — I42.0 DCM (DILATED CARDIOMYOPATHY) (HCC): ICD-10-CM

## 2021-10-29 PROBLEM — I47.10 PAROXYSMAL SVT (SUPRAVENTRICULAR TACHYCARDIA): Status: ACTIVE | Noted: 2021-10-29

## 2021-10-29 PROCEDURE — 99214 OFFICE O/P EST MOD 30 MIN: CPT | Performed by: PHYSICIAN ASSISTANT

## 2021-10-29 PROCEDURE — 93284 PRGRMG EVAL IMPLANTABLE DFB: CPT | Performed by: PHYSICIAN ASSISTANT

## 2021-10-29 RX ORDER — METOPROLOL SUCCINATE 50 MG/1
75 TABLET, EXTENDED RELEASE ORAL
Qty: 90 TABLET | Refills: 3 | Status: SHIPPED | OUTPATIENT
Start: 2021-10-29 | End: 2022-04-18 | Stop reason: SDUPTHER

## 2021-10-29 RX ORDER — MECLIZINE HYDROCHLORIDE 25 MG/1
1 TABLET ORAL AS NEEDED
COMMUNITY
Start: 2021-09-29

## 2021-10-29 NOTE — PROGRESS NOTES
Valeria Bobby  1950  [phone]    04/07/2021    [facility] CARDIOLOGY     Referring Provider: No ref. provider found     Lor Pulliam, APRN  [address]  [facility] [address]    Chief Complaint   Patient presents with   • Chronic systolic heart failure       Problem List:     1)Chronic Systolic Heart Failure; NICM      A) Echo 7/11/2020 Left ventricular systolic function is severely decreased.EF 20%. moderately dilated LV .  moderately dilated LA Mild MR     B) Echo 10/15/2020: EF 29%, moderately decreased LV function.  mild LV dilation,      C)7/13/20 - LHC : LM normal, LAD normal, LCX no obstructive disease, RCA no obstructive   disease       D) Echo, 10/15/20, EF 29%. LV mildly dilated.       E) Implantation of BiV ICD ([facility]) 12/11/2020  2)SVT remote RFA 2010 Dr. Edgar, Recurrent palpitations brief nature.   2) LBBB  3)Anemia   4)Chronic Kidney Disease     Allergies  Allergies   Allergen Reactions   • Codeine Nausea And Vomiting       Current Medications    Current Outpatient Medications:   •  cetirizine (zyrTEC) 10 MG tablet, Take 10 mg by mouth Daily As Needed for Allergies or Rhinitis. OTC, Disp: , Rfl:   •  fluticasone (FLONASE) 50 MCG/ACT nasal spray, 2 sprays into the nostril(s) as directed by provider Daily As Needed for Rhinitis or Allergies. OTC, Disp: , Rfl:   •  furosemide (LASIX) 20 MG tablet, Take 1 tablet by mouth As Needed (edema, >3lb weight gain)., Disp: 30 tablet, Rfl: 11  •  meclizine (ANTIVERT) 25 MG tablet, Take 1 tablet by mouth As Needed., Disp: , Rfl:   •  metoprolol succinate XL (TOPROL-XL) 50 MG 24 hr tablet, Take 1.5 tablets by mouth every night at bedtime., Disp: 90 tablet, Rfl: 3  •  montelukast (SINGULAIR) 10 MG tablet, As Needed., Disp: , Rfl:   •  potassium chloride (K-DUR) 10 MEQ CR tablet, Take 1 tablet by mouth As Needed (take with lasix)., Disp: 30 tablet, Rfl: 11  •  spironolactone (ALDACTONE) 25 MG tablet, Take 1 tablet by mouth Daily.,  "Disp: 90 tablet, Rfl: 3  •  valsartan (DIOVAN) 40 MG tablet, Take 1 tablet by mouth Every Night., Disp: 90 tablet, Rfl: 3  •  venlafaxine XR (EFFEXOR-XR) 75 MG 24 hr capsule, Take 1 capsule by mouth Daily., Disp: , Rfl:     History of Present Illness     Pt presents for follow up of  Chronic Systolic HF, LBBB, and [facility] ICD check. Patient overall is doing well. She has had no worsening heart failure symptoms. She denies any ICD shocks dizziness near syncope seen. She is having occasional episodes of tachypalpitations she has had these remote past after ablation Dr. Edgar. These seem to be more frequent in nature recently about once a week. In addition above she recently had a fall and injured her pelvis she is undergoing physical therapy but this is getting better. She is tolerating her current medical therapy well. She did not tolerate Coreg but switch to Toprol and this seems to be working okay for her.  ROS:  General:  Denies fatigue, weight gain or loss +blurred vision, double vision.   Cardiovascular:  Denies CP, PND, syncope, near syncope, edema or palpitations.  Pulmonary:  + JARVIS,- cough, or wheezing      Vitals:    10/29/21 1101   BP: 118/66   BP Location: Right arm   Patient Position: Sitting   Pulse: 86   SpO2: 96%   Weight: 70.8 kg (156 lb)   Height: 154.9 cm (61\")     Body mass index is 29.48 kg/m².  PE:  General: NAD  Neck: no JVD, no carotid bruits, no TM  Heart RRR, NL S1, S2, no rubs, murmurs  Lungs: CTA, no wheezes, rhonchi, or rales  Abd: soft, non-tender, NL BS  Ext: No musculoskeletal deformities, no edema, cyanosis, or clubbing  Psych: normal mood and affect    Diagnostic Data:  Manual Device Interrogation: SJM BiV ICD, DDDR 60 bpm normal interrogation with stable battery life. No arrhythmias. With exception of occasional SVT, atrial tachycardia.       Procedures    1. LBBB (left bundle branch block)    2. DCM (dilated cardiomyopathy) ([facility])          Plan:    1) Chronic Systolic Heart " Failure: Class I HF now  -s/p BiV ICD implantation with Dr. Biswas 12/11/20. Device with normal function today,   - Echo, 10/15/20, EF 29%. LV mildly dilated. BB, ARB, and Aldactone.   -     2) LBBB  3)Chronic Kidney Disease  - Cr 12/2020 1.02   4) brief episodes of SVT will titrate Toprol to 75 mg daily if these progress in nature will consider antiarrhythmic medication.      Increase Toprol 25 mg daily return to follow-up with her office in 6 months as scheduled. She will call if her palpitations increase in nature.  Electronically signed by YANG Pak, 10/29/21, 11:22 AM EDT.

## 2021-11-17 RX ORDER — SPIRONOLACTONE 25 MG/1
TABLET ORAL
Qty: 30 TABLET | OUTPATIENT
Start: 2021-11-17

## 2021-12-10 RX ORDER — SPIRONOLACTONE 25 MG/1
TABLET ORAL
Qty: 30 TABLET | Refills: 3 | Status: SHIPPED | OUTPATIENT
Start: 2021-12-10 | End: 2022-04-18 | Stop reason: SDUPTHER

## 2021-12-10 NOTE — TELEPHONE ENCOUNTER
Next appt 3/2022  Lab Results   Component Value Date    GLUCOSE 115 (H) 07/28/2021    CALCIUM 10.1 07/28/2021     07/28/2021    K 4.2 07/28/2021    CO2 22.0 07/28/2021     07/28/2021    BUN 28 (H) 07/28/2021    CREATININE 1.12 (H) 07/28/2021    EGFRIFNONA 48 (L) 07/28/2021    BCR 25.0 07/28/2021    ANIONGAP 11.0 07/28/2021

## 2022-01-22 PROCEDURE — 93296 REM INTERROG EVL PM/IDS: CPT | Performed by: INTERNAL MEDICINE

## 2022-01-22 PROCEDURE — 93295 DEV INTERROG REMOTE 1/2/MLT: CPT | Performed by: INTERNAL MEDICINE

## 2022-04-18 RX ORDER — METOPROLOL SUCCINATE 50 MG/1
75 TABLET, EXTENDED RELEASE ORAL
Qty: 90 TABLET | Refills: 3 | Status: SHIPPED | OUTPATIENT
Start: 2022-04-18 | End: 2022-08-31

## 2022-04-18 RX ORDER — VALSARTAN 40 MG/1
40 TABLET ORAL NIGHTLY
Qty: 90 TABLET | Refills: 3 | Status: SHIPPED | OUTPATIENT
Start: 2022-04-18

## 2022-04-18 RX ORDER — SPIRONOLACTONE 25 MG/1
25 TABLET ORAL DAILY
Qty: 30 TABLET | Refills: 3 | Status: SHIPPED | OUTPATIENT
Start: 2022-04-18 | End: 2023-02-15

## 2022-05-02 ENCOUNTER — OFFICE VISIT (OUTPATIENT)
Dept: CARDIOLOGY | Facility: CLINIC | Age: 72
End: 2022-05-02

## 2022-05-02 VITALS
DIASTOLIC BLOOD PRESSURE: 82 MMHG | BODY MASS INDEX: 29.76 KG/M2 | HEART RATE: 60 BPM | HEIGHT: 61 IN | SYSTOLIC BLOOD PRESSURE: 130 MMHG | WEIGHT: 157.6 LBS

## 2022-05-02 DIAGNOSIS — I47.1 PAROXYSMAL SVT (SUPRAVENTRICULAR TACHYCARDIA): Primary | ICD-10-CM

## 2022-05-02 DIAGNOSIS — I42.0 DCM (DILATED CARDIOMYOPATHY): ICD-10-CM

## 2022-05-02 DIAGNOSIS — I50.22 CHRONIC SYSTOLIC HEART FAILURE: ICD-10-CM

## 2022-05-02 DIAGNOSIS — I44.7 LBBB (LEFT BUNDLE BRANCH BLOCK): ICD-10-CM

## 2022-05-02 PROCEDURE — 93284 PRGRMG EVAL IMPLANTABLE DFB: CPT | Performed by: PHYSICIAN ASSISTANT

## 2022-05-02 PROCEDURE — 99213 OFFICE O/P EST LOW 20 MIN: CPT | Performed by: PHYSICIAN ASSISTANT

## 2022-05-02 PROCEDURE — 93000 ELECTROCARDIOGRAM COMPLETE: CPT | Performed by: PHYSICIAN ASSISTANT

## 2022-05-02 RX ORDER — FLUOXETINE HYDROCHLORIDE 20 MG/1
1 CAPSULE ORAL DAILY
COMMUNITY
Start: 2022-04-05

## 2022-05-02 NOTE — PROGRESS NOTES
Valeria Bobby  1950  [phone]    04/07/2021    [facility] CARDIOLOGY     Referring Provider: No ref. provider found     Lor Pulliam, APRN  [address]  [facility] [address]    Chief Complaint   Patient presents with   • LBBB (left bundle branch block)   • Paroxysmal SVT (supraventricular tachycardia)        Problem List:   1. Chronic systolic heart failure, nonischemic cardiomyopathy  a. Echocardiogram July 11, 2020 EF 20% moderately dilated LV moderately dilated left atrium mild MR  b. Echocardiogram October 15,020 EF 29% mildly decreased LV function mild LV dilation  c. Left heart catheterization July 13, 2020 nonobstructive coronary artery disease  d. Cardiogram October 15, 2020 EF 29%  e. Implantation of bivicd ([facility]) December (20  2. SVT with remote RFA by Dr. Tyson Edgar   3. [place] heart class II systolic heart failure  4. Chronic left bundle branch block  5. Chronic anemia  6. Chronic kidney disease        Allergies  Allergies   Allergen Reactions   • Codeine Nausea And Vomiting       Current Medications    Current Outpatient Medications:   •  cetirizine (zyrTEC) 10 MG tablet, Take 10 mg by mouth Daily As Needed for Allergies or Rhinitis. OTC, Disp: , Rfl:   •  FLUoxetine (PROzac) 20 MG capsule, Take 1 capsule by mouth Daily., Disp: , Rfl:   •  fluticasone (FLONASE) 50 MCG/ACT nasal spray, 2 sprays into the nostril(s) as directed by provider Daily As Needed for Rhinitis or Allergies. OTC, Disp: , Rfl:   •  furosemide (LASIX) 20 MG tablet, Take 1 tablet by mouth As Needed (edema, >3lb weight gain)., Disp: 30 tablet, Rfl: 11  •  meclizine (ANTIVERT) 25 MG tablet, Take 1 tablet by mouth As Needed., Disp: , Rfl:   •  metoprolol succinate XL (TOPROL-XL) 50 MG 24 hr tablet, Take 1.5 tablets by mouth every night at bedtime., Disp: 90 tablet, Rfl: 3  •  montelukast (SINGULAIR) 10 MG tablet, Take 10 mg by mouth As Needed., Disp: , Rfl:   •  potassium chloride (K-DUR) 10  "MEQ CR tablet, Take 1 tablet by mouth As Needed (take with lasix)., Disp: 30 tablet, Rfl: 11  •  spironolactone (ALDACTONE) 25 MG tablet, Take 1 tablet by mouth Daily., Disp: 30 tablet, Rfl: 3  •  Unable to find, 1 each Daily. Med Name: FORTEO (to help build bones injection) 1 injection every day, Disp: , Rfl:   •  valsartan (DIOVAN) 40 MG tablet, Take 1 tablet by mouth Every Night., Disp: 90 tablet, Rfl: 3  •  venlafaxine XR (EFFEXOR-XR) 75 MG 24 hr capsule, Take 1 capsule by mouth Daily., Disp: , Rfl:     History of Present Illness     Pt presents for follow up of  Chronic Systolic HF, LBBB, and [facility] ICD check.  Patient states that she feels she is doing better in the past few months.  She was having quite a bit of hip pain where she had fallen her hip but this is now resolved.  She is try to get more active she feels deconditioned.  She denies having worsening heart failure symptoms tachypalpitations dizziness ICD shocks or chest pain.  She is still grieving over the loss of her  and this is led her to be depressed over some time.    Vitals:    05/02/22 1022   BP: 130/82   BP Location: Left arm   Patient Position: Sitting   Pulse: 60   Weight: 71.5 kg (157 lb 9.6 oz)   Height: 154.9 cm (61\")     Body mass index is 29.78 kg/m².  PE:  General: NAD  Neck: no JVD, no carotid bruits, no TM  Heart RRR, NL S1, S2, no rubs, murmurs  Lungs: CTA, no wheezes, rhonchi, or rales  Abd: soft, non-tender, NL BS  Ext: No musculoskeletal deformities, no edema, cyanosis, or clubbing  Psych: normal mood and affect    Diagnostic Data:  Manual Device Interrogation: [facility] bivicd DDDR 60 a paced 13% RV LV paced 9%.  Normal threshold impedances.  Battery voltage 5.2 years remaining.  No events.       ECG 12 Lead    Date/Time: 5/2/2022 1:09 PM  Performed by: Frank Bennett PA  Authorized by: Frank Bennett PA   Rhythm: sinus rhythm and paced  Rate: normal  Conduction: conduction normal  ST Segments: ST segments " "normal  QRS axis: normal  Other: no other findings    Clinical impression: normal ECG            1. Paroxysmal SVT (supraventricular tachycardia) ([facility])    2. LBBB (left bundle branch block)    3. Chronic systolic heart failure (HCC)    4. DCM (dilated cardiomyopathy) ([facility])          Plan:    1) Chronic Systolic Heart Failure: Class I HF now  -s/p BiV ICD implantation with Dr. Biswas 12/11/20. Device with normal function today,   - Echo, 10/15/20, EF 29%. LV mildly dilated. BB, ARB, and Aldactone.     2) LBBB    3)Chronic Kidney Disease  - Cr 12/2020 1.02     4) SVT: Resolved.     · Overall stable CV course.  We discussed with option of adding Entresto in place of Diovan.  For now she would like to continue to work on her conditioning as she feels she is \"out of shape\".  She will reduce her Toprol back to 50 daily as she thinks the higher dose made her feel fatigued.  · Return follow-up as scheduled  Electronically signed by YANG Pak, 05/02/22, 1:12 PM EDT.  "

## 2022-07-14 ENCOUNTER — TELEPHONE (OUTPATIENT)
Dept: CARDIOLOGY | Facility: CLINIC | Age: 72
End: 2022-07-14

## 2022-07-14 NOTE — TELEPHONE ENCOUNTER
Requesting appt be moved up. For the last 2 days she has been short of breath and nauseated. Denies chest pain and palpitations. No swelling noted. Has gained 20 pounds in the last 6 months from inactvity from her broken pelvic bone. She is going to call her PCP for appt today and will let us know if she needs a sooner appt. Please advise if any further instructions.

## 2022-07-14 NOTE — TELEPHONE ENCOUNTER
Notified of message above from . Appt tomorrow at [facility] at 1045. Verbalized undersatnding

## 2022-07-14 NOTE — TELEPHONE ENCOUNTER
Agree with evaluation at PCP, see if we can get her in to be evaluated at [facility] within the next week.

## 2022-08-31 ENCOUNTER — OFFICE VISIT (OUTPATIENT)
Dept: CARDIOLOGY | Facility: CLINIC | Age: 72
End: 2022-08-31

## 2022-08-31 VITALS
SYSTOLIC BLOOD PRESSURE: 114 MMHG | BODY MASS INDEX: 29.64 KG/M2 | DIASTOLIC BLOOD PRESSURE: 66 MMHG | WEIGHT: 157 LBS | HEIGHT: 61 IN | HEART RATE: 67 BPM | OXYGEN SATURATION: 98 %

## 2022-08-31 DIAGNOSIS — I44.7 LBBB (LEFT BUNDLE BRANCH BLOCK): ICD-10-CM

## 2022-08-31 DIAGNOSIS — I50.22 CHRONIC SYSTOLIC HEART FAILURE: Primary | ICD-10-CM

## 2022-08-31 DIAGNOSIS — I47.1 PAROXYSMAL SVT (SUPRAVENTRICULAR TACHYCARDIA): ICD-10-CM

## 2022-08-31 PROCEDURE — 99213 OFFICE O/P EST LOW 20 MIN: CPT | Performed by: INTERNAL MEDICINE

## 2022-08-31 RX ORDER — METOPROLOL SUCCINATE 50 MG/1
50 TABLET, EXTENDED RELEASE ORAL
Qty: 90 TABLET | Refills: 3
Start: 2022-08-31

## 2022-08-31 RX ORDER — ACYCLOVIR 400 MG/1
TABLET ORAL AS NEEDED
COMMUNITY
Start: 2022-08-09

## 2022-08-31 NOTE — PROGRESS NOTES
[facility]  Office visit  Valeria Bobby  1950    There is no work phone number on file.    VISIT DATE:  8/31/2022    PCP: Lor Pulliam, APRN  [address]  [facility] [address]    CC:  Chief Complaint   Patient presents with   • SHF   • PSVT   • LBBB   • DCM       Previous cardiac studies and procedures:  July 2020  Echo  · Left ventricular systolic function is severely decreased.  · Calculated EF = 20%. Estimated EF was in agreement with the calculated EF.  · The left ventricular cavity is moderately dilated.  · Left atrial cavity size is moderately dilated.  · Mild mitral valve regurgitation is present  Cardiac catheterization: No angiographically significant coronary disease.    August 2020 echo  · Left ventricular systolic function is severely decreased.  · Estimated EF appears to be in the range of 26 - 30%.    October 2020 echo  · Calculated left ventricular EF = 29% Estimated left ventricular EF was in agreement with the calculated left ventricular EF. Left ventricular systolic function is moderately decreased.  · The left ventricular cavity is mildly dilated.    December 2020: [facility] CRT-D    ASSESSMENT:   Diagnosis Plan   1. Chronic systolic heart failure (HCC)     2. LBBB (left bundle branch block)     3. Paroxysmal SVT (supraventricular tachycardia) (HCC)         PLAN:  Continue Toprol-XL 50 mg p.o. daily, spironolactone 25 mg p.o. daily, valsartan 40 mg p.o. at bedtime.  Previously intolerant to carvedilol and higher doses of Toprol due to hypotension.  Unlikely to tolerate switching to Entresto at this time.      Subjective  Stable functional capacity.   Denies dyspnea, chest pain or palpitations. Blood pressures running less than 120/80 mmHg, systolic blood pressures often running less than 110 mmHg.  She is very compliant with medical therapy.  Tolerated CRT-D implantation very well.    PHYSICAL EXAMINATION:  Vitals:    08/31/22 1421   BP: 114/66   BP  "Location: Left arm   Patient Position: Sitting   Pulse: 67   SpO2: 98%   Weight: 71.2 kg (157 lb)   Height: 154.9 cm (60.98\")     General Appearance:    Alert, cooperative, no distress, appears stated age   Head:    Normocephalic, without obvious abnormality, atraumatic   Eyes:    conjunctiva/corneas clear   Nose:   Nares normal, septum midline, mucosa normal, no drainage   Throat:   Lips, teeth and gums normal   Neck:   Supple, symmetrical, trachea midline, no carotid    bruit or JVD   Lungs:     Clear to auscultation bilaterally, respirations unlabored   Chest Wall:    No tenderness or deformity    Heart:    Regular rate and rhythm, S1 and S2 normal, no murmur, rub   or gallop, normal carotid impulse bilaterally without bruit.   Abdomen:     Soft, non-tender   Extremities:   Extremities normal, atraumatic, no cyanosis or edema   Pulses:   2+ and symmetric all extremities   Skin:   Skin color, texture, turgor normal, no rashes or lesions       Diagnostic Data:  Procedures  No results found for: CHLPL, TRIG, HDL, LDLDIRECT  Lab Results   Component Value Date    GLUCOSE 115 (H) 07/28/2021    BUN 28 (H) 07/28/2021    CREATININE 1.12 (H) 07/28/2021     07/28/2021    K 4.2 07/28/2021     07/28/2021    CO2 22.0 07/28/2021     Lab Results   Component Value Date    HGBA1C 5.40 12/11/2020     Lab Results   Component Value Date    WBC 14.40 (H) 07/28/2021    HGB 11.7 (L) 07/28/2021    HCT 37.9 07/28/2021     (H) 07/28/2021       Allergies  Allergies   Allergen Reactions   • Codeine Nausea And Vomiting       Current Medications    Current Outpatient Medications:   •  acyclovir (ZOVIRAX) 400 MG tablet, As Needed., Disp: , Rfl:   •  cetirizine (zyrTEC) 10 MG tablet, Take 10 mg by mouth Daily As Needed for Allergies or Rhinitis. OTC, Disp: , Rfl:   •  FLUoxetine (PROzac) 20 MG capsule, Take 1 capsule by mouth Daily., Disp: , Rfl:   •  fluticasone (FLONASE) 50 MCG/ACT nasal spray, 2 sprays into the nostril(s) " as directed by provider Daily As Needed for Rhinitis or Allergies. OTC, Disp: , Rfl:   •  furosemide (LASIX) 20 MG tablet, Take 1 tablet by mouth As Needed (edema, >3lb weight gain)., Disp: 30 tablet, Rfl: 11  •  meclizine (ANTIVERT) 25 MG tablet, Take 1 tablet by mouth As Needed., Disp: , Rfl:   •  metoprolol succinate XL (TOPROL-XL) 50 MG 24 hr tablet, Take 1.5 tablets by mouth every night at bedtime., Disp: 90 tablet, Rfl: 3  •  montelukast (SINGULAIR) 10 MG tablet, Take 10 mg by mouth As Needed., Disp: , Rfl:   •  potassium chloride (K-DUR) 10 MEQ CR tablet, Take 1 tablet by mouth As Needed (take with lasix)., Disp: 30 tablet, Rfl: 11  •  spironolactone (ALDACTONE) 25 MG tablet, Take 1 tablet by mouth Daily., Disp: 30 tablet, Rfl: 3  •  Unable to find, 1 each Daily. Med Name: FORTEO (to help build bones injection) 1 injection every day, Disp: , Rfl:   •  valsartan (DIOVAN) 40 MG tablet, Take 1 tablet by mouth Every Night., Disp: 90 tablet, Rfl: 3  •  venlafaxine XR (EFFEXOR-XR) 75 MG 24 hr capsule, Take 1 capsule by mouth Daily., Disp: , Rfl:           ROS  Review of Systems   Cardiovascular: Negative for chest pain, dyspnea on exertion, irregular heartbeat, leg swelling and palpitations.   Respiratory: Negative for shortness of breath and wheezing.          SOCIAL HX  Social History     Socioeconomic History   • Marital status:    Tobacco Use   • Smoking status: Never Smoker   • Smokeless tobacco: Never Used   Vaping Use   • Vaping Use: Never used   Substance and Sexual Activity   • Alcohol use: Never   • Drug use: Never   • Sexual activity: Defer       FAMILY HX  Family History   Problem Relation Age of Onset   • Heart attack Father    • No Known Problems Brother    • Ovarian cancer Neg Hx    • Breast cancer Neg Hx              Bobby Peralta III, MD, [facility]

## 2022-10-22 PROCEDURE — 93295 DEV INTERROG REMOTE 1/2/MLT: CPT | Performed by: INTERNAL MEDICINE

## 2022-10-22 PROCEDURE — 93296 REM INTERROG EVL PM/IDS: CPT | Performed by: INTERNAL MEDICINE

## 2023-01-21 PROCEDURE — 93296 REM INTERROG EVL PM/IDS: CPT | Performed by: INTERNAL MEDICINE

## 2023-01-21 PROCEDURE — 93295 DEV INTERROG REMOTE 1/2/MLT: CPT | Performed by: INTERNAL MEDICINE

## 2023-02-15 RX ORDER — SPIRONOLACTONE 25 MG/1
TABLET ORAL
Qty: 90 TABLET | Refills: 0 | Status: SHIPPED | OUTPATIENT
Start: 2023-02-15

## 2023-04-22 PROCEDURE — 93295 DEV INTERROG REMOTE 1/2/MLT: CPT | Performed by: INTERNAL MEDICINE

## 2023-04-22 PROCEDURE — 93296 REM INTERROG EVL PM/IDS: CPT | Performed by: INTERNAL MEDICINE

## 2023-05-09 RX ORDER — VALSARTAN 40 MG/1
TABLET ORAL
Qty: 30 TABLET | Refills: 0 | Status: SHIPPED | OUTPATIENT
Start: 2023-05-09

## 2023-05-09 RX ORDER — SPIRONOLACTONE 25 MG/1
TABLET ORAL
Qty: 30 TABLET | Refills: 0 | Status: SHIPPED | OUTPATIENT
Start: 2023-05-09

## 2023-05-09 RX ORDER — METOPROLOL SUCCINATE 50 MG/1
TABLET, EXTENDED RELEASE ORAL
Qty: 45 TABLET | Refills: 0 | Status: SHIPPED | OUTPATIENT
Start: 2023-05-09

## 2023-06-09 RX ORDER — VALSARTAN 40 MG/1
TABLET ORAL
Qty: 30 TABLET | Refills: 0 | Status: SHIPPED | OUTPATIENT
Start: 2023-06-09 | End: 2023-06-12 | Stop reason: ALTCHOICE

## 2023-06-09 RX ORDER — METOPROLOL SUCCINATE 50 MG/1
TABLET, EXTENDED RELEASE ORAL
Qty: 45 TABLET | Refills: 0 | Status: SHIPPED | OUTPATIENT
Start: 2023-06-09

## 2023-06-09 RX ORDER — SPIRONOLACTONE 25 MG/1
TABLET ORAL
Qty: 30 TABLET | Refills: 0 | Status: SHIPPED | OUTPATIENT
Start: 2023-06-09

## 2023-06-12 ENCOUNTER — OFFICE VISIT (OUTPATIENT)
Dept: CARDIOLOGY | Facility: CLINIC | Age: 73
End: 2023-06-12
Payer: MEDICARE

## 2023-06-12 VITALS
HEIGHT: 61 IN | HEART RATE: 63 BPM | DIASTOLIC BLOOD PRESSURE: 80 MMHG | BODY MASS INDEX: 29.87 KG/M2 | OXYGEN SATURATION: 97 % | SYSTOLIC BLOOD PRESSURE: 140 MMHG | WEIGHT: 158.2 LBS

## 2023-06-12 DIAGNOSIS — I44.7 LBBB (LEFT BUNDLE BRANCH BLOCK): ICD-10-CM

## 2023-06-12 DIAGNOSIS — I42.0 DCM (DILATED CARDIOMYOPATHY): ICD-10-CM

## 2023-06-12 RX ORDER — SACUBITRIL AND VALSARTAN 24; 26 MG/1; MG/1
1 TABLET, FILM COATED ORAL 2 TIMES DAILY
Qty: 60 TABLET | Refills: 6 | Status: SHIPPED | OUTPATIENT
Start: 2023-06-12

## 2023-06-12 NOTE — PROGRESS NOTES
"Valeria Bobby  1950  [phone]        [facility] CARDIOLOGY [facility]         Lor Pulliam, APRN  [address]  [place] [id number]    Chief Complaint   Patient presents with    Paroxysmal SVT     LBBB (left bundle branch block)       Problem List:   Chronic systolic heart failure, nonischemic cardiomyopathy  Echocardiogram July 11, 2020 EF 20% moderately dilated LV moderately dilated left atrium mild MR  Echocardiogram October 15,020 EF 29% mildly decreased LV function mild LV dilation  Left heart catheterization July 13, 2020 nonobstructive coronary artery disease  Cardiogram October 15, 2020 EF 29%  Implantation of bivicd ([facility]) December (2020)  SVT with remote RFA by Dr. Tyson Edgar   [place] heart class II systolic heart failure  Chronic left bundle branch block  Chronic anemia  Chronic kidney disease        Allergies  Allergies   Allergen Reactions    Codeine Nausea And Vomiting       Current Medications    Current Outpatient Medications:     acyclovir (ZOVIRAX) 400 MG tablet, As Needed., Disp: , Rfl:     FLUoxetine (PROzac) 20 MG capsule, Take 1 capsule by mouth Daily., Disp: , Rfl:     meclizine (ANTIVERT) 25 MG tablet, Take 1 tablet by mouth As Needed., Disp: , Rfl:     metoprolol succinate XL (TOPROL-XL) 50 MG 24 hr tablet, TAKE ONE & ONE -HALF (1&1/2) TABLET ORALLY (BY MOUTH) EACH NIGHT AT BEDTIME, Disp: 45 tablet, Rfl: 0    spironolactone (ALDACTONE) 25 MG tablet, TAKE ONE (1) TABLET BY MOUTH ONCE DAILY, Disp: 30 tablet, Rfl: 0    sacubitril-valsartan (Entresto) 24-26 MG tablet, Take 1 tablet by mouth 2 (Two) Times a Day., Disp: 60 tablet, Rfl: 6    History of Present Illness     Pt presents for follow up of  Chronic Systolic HF, LBBB, and [facility] ICD check.  She reports that she has some good days and bad days but she still has not gained her \"energy\" back since she was diagnosed with a cardiomyopathy.  She is not having heart failure symptoms such as " "orthopnea PediaProfen you.  He is not having chest pain.  No ICD shocks.  No sustained tachypalpitations.    Vitals:    06/12/23 1021   BP: 140/80   BP Location: Right arm   Patient Position: Sitting   Pulse: 63   SpO2: 97%   Weight: 71.8 kg (158 lb 3.2 oz)   Height: 154.9 cm (61\")     Body mass index is 29.89 kg/m².  PE:  General: NAD  Neck: no JVD, no carotid bruits, no TM  Heart RRR, NL S1, S2, no rubs, murmurs  Lungs: CTA, no wheezes, rhonchi, or rales  Abd: soft, non-tender, NL BS  Ext: No musculoskeletal deformities, no edema, cyanosis, or clubbing  Psych: normal mood and affect    Diagnostic Data:  Manual Device Interrogation: [facility] bivicd.  DDDR at 60.  A paced 20%.  CRT 9 9%.  Normal P wave R wave thresholds impedance.  Battery voltage 4.1 years remaining.  1 episode of SVT less than 30 seconds.  Change sensed AV delay from 100 ms to 90 ms.  V pacing LV plus RV simultaneous change to LV to RV 35 ms  based on quick opt recommendations.     Procedures    1. LBBB (left bundle branch block)    2. DCM (dilated cardiomyopathy)          Plan:    1) Chronic Systolic Heart Failure: Class I HF now  -s/p BiV ICD implantation with Dr. Biswas 12/11/20. Device with normal function today,   - Echo, 10/15/20, EF 29%. LV mildly dilated. BB, ARB, and Aldactone.     2) LBBB    3)Chronic Kidney Disease  - Cr 12/2020 1.02     4) SVT: Resolved.       Discussed again with her that despite trying to change Toprol dose, her exercising daily basis still has low fatigue and low energy energy.  She tells me that she has a family member on Entresto she would like to give this a try.  She will stop her Diovan for 48-hours she will start Entresto 24/26 mg twice daily.  Follow-up BMP in 1 week.  Also repeat echocardiogram to see if her EF has improved any.  She will return to follow-up her lites as scheduled Dr. Biswas in 8 months or sooner as needed.  Electronically signed by YANG Pak, 06/12/23, 8:25 AM EDT.  "

## 2023-07-22 PROCEDURE — 93295 DEV INTERROG REMOTE 1/2/MLT: CPT | Performed by: INTERNAL MEDICINE

## 2023-07-22 PROCEDURE — 93296 REM INTERROG EVL PM/IDS: CPT | Performed by: INTERNAL MEDICINE

## 2023-07-24 RX ORDER — SPIRONOLACTONE 25 MG/1
25 TABLET ORAL DAILY
Qty: 30 TABLET | Refills: 0 | Status: SHIPPED | OUTPATIENT
Start: 2023-07-24

## 2023-08-01 ENCOUNTER — TELEPHONE (OUTPATIENT)
Dept: CARDIOLOGY | Facility: CLINIC | Age: 73
End: 2023-08-01

## 2023-08-01 NOTE — TELEPHONE ENCOUNTER
HUB TO READ:    Called [employer] again regarding form. They still had the incorrect fax number. Correct number was given and waiting on form to be faxed to office.

## 2023-08-01 NOTE — TELEPHONE ENCOUNTER
Caller: Ernst Bobbyine    Relationship: Self    Best call back number: [phone]    What form or medical record are you requesting: [employer]    Who is requesting this form or medical record from you: PATIENT AND [facility]    How would you like to receive the form or medical records (pick-up, mail, fax): FAX  If fax, what is the fax number: [phone]  PHONE NUMBER: [phone]    Timeframe paperwork needed: ASAP PATIENT NOW HAS LESS THAN 2 WEEKS TO GET INFORMATION BACK TO [employer]. PATIENT HAS ALREADY REACHED OUT REGARDING THIS MESSAGE. PLEASE REACH OUT [facility] WHEN PAPERWORK HAS BEEN FAXED, THANK YOU.    Additional notes: IN REGARDS TO THE COST OF ENTRESTO TO HELP PATIENT

## 2023-08-28 RX ORDER — SPIRONOLACTONE 25 MG/1
TABLET ORAL
Qty: 30 TABLET | Refills: 0 | Status: SHIPPED | OUTPATIENT
Start: 2023-08-28

## 2023-09-06 ENCOUNTER — OFFICE VISIT (OUTPATIENT)
Dept: CARDIOLOGY | Facility: CLINIC | Age: 73
End: 2023-09-06
Payer: MEDICARE

## 2023-09-06 ENCOUNTER — HOSPITAL ENCOUNTER (OUTPATIENT)
Dept: CARDIOLOGY | Facility: HOSPITAL | Age: 73
Discharge: HOME | End: 2023-09-06
Admitting: PHYSICIAN ASSISTANT
Payer: MEDICARE

## 2023-09-06 VITALS
SYSTOLIC BLOOD PRESSURE: 136 MMHG | HEIGHT: 60 IN | BODY MASS INDEX: 30.86 KG/M2 | OXYGEN SATURATION: 98 % | DIASTOLIC BLOOD PRESSURE: 80 MMHG | WEIGHT: 157.2 LBS | HEART RATE: 62 BPM

## 2023-09-06 DIAGNOSIS — I44.7 LBBB (LEFT BUNDLE BRANCH BLOCK): ICD-10-CM

## 2023-09-06 DIAGNOSIS — I42.0 DCM (DILATED CARDIOMYOPATHY): ICD-10-CM

## 2023-09-06 DIAGNOSIS — I47.1 PAROXYSMAL SVT (SUPRAVENTRICULAR TACHYCARDIA): ICD-10-CM

## 2023-09-06 DIAGNOSIS — I42.0 DCM (DILATED CARDIOMYOPATHY): Primary | ICD-10-CM

## 2023-09-06 LAB
BH CV ECHO MEAS - AO MAX PG: 5.9 MMHG
BH CV ECHO MEAS - AO MEAN PG: 3 MMHG
BH CV ECHO MEAS - AO ROOT DIAM: 3.2 CM
BH CV ECHO MEAS - AO V2 MAX: 121 CM/SEC
BH CV ECHO MEAS - AO V2 VTI: 28.2 CM
BH CV ECHO MEAS - AVA(I,D): 2.27 CM2
BH CV ECHO MEAS - EDV(CUBED): 59.3 ML
BH CV ECHO MEAS - EDV(MOD-SP2): 79.6 ML
BH CV ECHO MEAS - EDV(MOD-SP4): 55.9 ML
BH CV ECHO MEAS - EF(MOD-BP): 39 %
BH CV ECHO MEAS - EF(MOD-SP2): 33.4 %
BH CV ECHO MEAS - EF(MOD-SP4): 38.8 %
BH CV ECHO MEAS - ESV(CUBED): 47.2 ML
BH CV ECHO MEAS - ESV(MOD-SP2): 53 ML
BH CV ECHO MEAS - ESV(MOD-SP4): 34.2 ML
BH CV ECHO MEAS - FS: 7.3 %
BH CV ECHO MEAS - IVS/LVPW: 1 CM
BH CV ECHO MEAS - IVSD: 1.2 CM
BH CV ECHO MEAS - LA DIMENSION: 3.9 CM
BH CV ECHO MEAS - LAT PEAK E' VEL: 4.2 CM/SEC
BH CV ECHO MEAS - LV MASS(C)D: 201.5 GRAMS
BH CV ECHO MEAS - LV MAX PG: 3.7 MMHG
BH CV ECHO MEAS - LV MEAN PG: 2 MMHG
BH CV ECHO MEAS - LV V1 MAX: 95.6 CM/SEC
BH CV ECHO MEAS - LV V1 VTI: 20.4 CM
BH CV ECHO MEAS - LVIDD: 3.9 CM
BH CV ECHO MEAS - LVIDS: 3.6 CM
BH CV ECHO MEAS - LVOT AREA: 3.1 CM2
BH CV ECHO MEAS - LVOT DIAM: 2 CM
BH CV ECHO MEAS - LVPWD: 1.2 CM
BH CV ECHO MEAS - MED PEAK E' VEL: 6.8 CM/SEC
BH CV ECHO MEAS - MV A MAX VEL: 86.8 CM/SEC
BH CV ECHO MEAS - MV DEC SLOPE: 342.7 CM/SEC2
BH CV ECHO MEAS - MV DEC TIME: 0.24 MSEC
BH CV ECHO MEAS - MV E MAX VEL: 89.7 CM/SEC
BH CV ECHO MEAS - MV E/A: 1.03
BH CV ECHO MEAS - MV MAX PG: 4.4 MMHG
BH CV ECHO MEAS - MV MEAN PG: 2.03 MMHG
BH CV ECHO MEAS - MV P1/2T: 85.9 MSEC
BH CV ECHO MEAS - MV V2 VTI: 35.9 CM
BH CV ECHO MEAS - MVA(P1/2T): 2.6 CM2
BH CV ECHO MEAS - MVA(VTI): 1.78 CM2
BH CV ECHO MEAS - PA ACC TIME: 0.18 SEC
BH CV ECHO MEAS - RAP SYSTOLE: 3 MMHG
BH CV ECHO MEAS - RVSP: 23 MMHG
BH CV ECHO MEAS - SV(LVOT): 63.9 ML
BH CV ECHO MEAS - SV(MOD-SP2): 26.6 ML
BH CV ECHO MEAS - SV(MOD-SP4): 21.7 ML
BH CV ECHO MEAS - TAPSE (>1.6): 1.87 CM
BH CV ECHO MEAS - TR MAX PG: 19.8 MMHG
BH CV ECHO MEAS - TR MAX VEL: 222.3 CM/SEC
BH CV ECHO MEASUREMENTS AVERAGE E/E' RATIO: 16.31
BH CV XLRA - RV BASE: 3.1 CM
BH CV XLRA - RV LENGTH: 6.5 CM
BH CV XLRA - RV MID: 2.6 CM
BH CV XLRA - TDI S': 9.6 CM/SEC
LEFT ATRIUM VOLUME INDEX: 28.5 ML/M2
LV EF 2D ECHO EST: 30 %

## 2023-09-06 PROCEDURE — 93306 TTE W/DOPPLER COMPLETE: CPT

## 2023-09-06 PROCEDURE — 99213 OFFICE O/P EST LOW 20 MIN: CPT | Performed by: INTERNAL MEDICINE

## 2023-09-06 RX ORDER — SACUBITRIL AND VALSARTAN 24; 26 MG/1; MG/1
1 TABLET, FILM COATED ORAL 2 TIMES DAILY
COMMUNITY
End: 2023-09-06

## 2023-09-06 NOTE — PROGRESS NOTES
[facility] [place], [place]  Office visit  Valeria Bobby  1950    There is no work phone number on file.    VISIT DATE:  9/6/2023    PCP: Mercedez Alvarez, PA  [address]  [facility] [address]    CC:  Chief Complaint   Patient presents with    Chronic systolic heart failure       Previous cardiac studies and procedures:  July 2020  Echo  Left ventricular systolic function is severely decreased.  Calculated EF = 20%. Estimated EF was in agreement with the calculated EF.  The left ventricular cavity is moderately dilated.  Left atrial cavity size is moderately dilated.  Mild mitral valve regurgitation is present  Cardiac catheterization: No angiographically significant coronary disease.    August 2020 echo  Left ventricular systolic function is severely decreased.  Estimated EF appears to be in the range of 26 - 30%.    October 2020 echo  Calculated left ventricular EF = 29% Estimated left ventricular EF was in agreement with the calculated left ventricular EF. Left ventricular systolic function is moderately decreased.  The left ventricular cavity is mildly dilated.    December 2020: [facility] CRT-D    ASSESSMENT:   Diagnosis Plan   1. DCM (dilated cardiomyopathy)        2. LBBB (left bundle branch block)        3. Paroxysmal SVT (supraventricular tachycardia)            PLAN:  Continue Toprol-XL 50 mg p.o. daily, spironolactone 25 mg p.o. daily.  Previously intolerant to carvedilol and higher doses of Toprol due to hypotension/fatigue.  Titrating Entresto to 49-51 mg p.o. twice daily.    Subjective  Stable functional capacity.   Denies dyspnea, chest pain or palpitations. Blood pressures running less than 120/80 mmHg,.  She is very compliant with medical therapy.  Status post CRT-D.    PHYSICAL EXAMINATION:  Vitals:    09/06/23 1027   BP: 136/80   BP Location: Left arm   Patient Position: Sitting   Cuff Size: Adult   Pulse: 62   SpO2: 98%   Weight: 71.3 kg (157 lb 3.2 oz)   Height: 152.4 cm  "(60\")     General Appearance:    Alert, cooperative, no distress, appears stated age   Head:    Normocephalic, without obvious abnormality, atraumatic   Eyes:    conjunctiva/corneas clear   Nose:   Nares normal, septum midline, mucosa normal, no drainage   Throat:   Lips, teeth and gums normal   Neck:   Supple, symmetrical, trachea midline, no carotid    bruit or JVD   Lungs:     Clear to auscultation bilaterally, respirations unlabored   Chest Wall:    No tenderness or deformity    Heart:    Regular rate and rhythm, S1 and S2 normal, no murmur, rub   or gallop, normal carotid impulse bilaterally without bruit.   Abdomen:     Soft, non-tender   Extremities:   Extremities normal, atraumatic, no cyanosis or edema   Pulses:   2+ and symmetric all extremities   Skin:   Skin color, texture, turgor normal, no rashes or lesions       Diagnostic Data:  Procedures  No results found for: CHLPL, TRIG, HDL, LDLDIRECT  Lab Results   Component Value Date    GLUCOSE 115 (H) 07/28/2021    BUN 28 (H) 07/28/2021    CREATININE 1.12 (H) 07/28/2021     07/28/2021    K 4.2 07/28/2021     07/28/2021    CO2 22.0 07/28/2021     Lab Results   Component Value Date    HGBA1C 5.40 12/11/2020     Lab Results   Component Value Date    WBC 14.40 (H) 07/28/2021    HGB 11.7 (L) 07/28/2021    HCT 37.9 07/28/2021     (H) 07/28/2021       Allergies  Allergies   Allergen Reactions    Codeine Nausea And Vomiting       Current Medications    Current Outpatient Medications:     acyclovir (ZOVIRAX) 400 MG tablet, As Needed., Disp: , Rfl:     metoprolol succinate XL (TOPROL-XL) 50 MG 24 hr tablet, TAKE ONE & ONE -HALF (1&1/2) TABLET ORALLY (BY MOUTH) EACH NIGHT AT BEDTIME, Disp: 45 tablet, Rfl: 0    spironolactone (ALDACTONE) 25 MG tablet, TAKE ONE (1) TABLET BY MOUTH ONCE DAILY, Disp: 30 tablet, Rfl: 0    FLUoxetine (PROzac) 20 MG capsule, Take 1 capsule by mouth Daily. (Patient not taking: Reported on 9/6/2023), Disp: , Rfl:     " meclizine (ANTIVERT) 25 MG tablet, Take 1 tablet by mouth As Needed. (Patient not taking: Reported on 9/6/2023), Disp: , Rfl:     sacubitril-valsartan (ENTRESTO) 49-51 MG tablet, Take 1 tablet by mouth 2 (Two) Times a Day., Disp: 60 tablet, Rfl:           ROS  Review of Systems   Cardiovascular:  Negative for chest pain, dyspnea on exertion, irregular heartbeat, leg swelling and palpitations.   Respiratory:  Negative for shortness of breath and wheezing.        SOCIAL HX  Social History     Socioeconomic History    Marital status:    Tobacco Use    Smoking status: Never    Smokeless tobacco: Never   Vaping Use    Vaping Use: Never used   Substance and Sexual Activity    Alcohol use: Never    Drug use: Never    Sexual activity: Defer       FAMILY HX  Family History   Problem Relation Age of Onset    Heart attack Father     No Known Problems Brother     Ovarian cancer Neg Hx     Breast cancer Neg Hx              Bobby Peralta III, MD, FACC

## 2023-09-20 NOTE — TELEPHONE ENCOUNTER
Name: GILMAR  Relationship: [employer]  HUB PROVIDED THE RELAY MESSAGE FROM THE OFFICE  ADDITIONAL INFORMATION: GILMAR FROM [employer] STATED HE FAXED OVER PAPERWORK TODAY 9.20.23- PROVIDED FAX NUMBER AGAIN DUE TO GILMAR READING BACK WRONG FAX. WILL AWAIT FOR FAX TO ARRIVE

## 2023-10-09 RX ORDER — METOPROLOL SUCCINATE 50 MG/1
TABLET, EXTENDED RELEASE ORAL
Qty: 45 TABLET | Refills: 0 | Status: SHIPPED | OUTPATIENT
Start: 2023-10-09

## 2023-10-09 RX ORDER — SPIRONOLACTONE 25 MG/1
TABLET ORAL
Qty: 30 TABLET | Refills: 3 | Status: SHIPPED | OUTPATIENT
Start: 2023-10-09

## 2023-11-03 RX ORDER — METOPROLOL SUCCINATE 50 MG/1
TABLET, EXTENDED RELEASE ORAL
Qty: 45 TABLET | Refills: 0 | Status: SHIPPED | OUTPATIENT
Start: 2023-11-03

## 2023-12-06 RX ORDER — METOPROLOL SUCCINATE 50 MG/1
TABLET, EXTENDED RELEASE ORAL
Qty: 45 TABLET | Refills: 5 | Status: SHIPPED | OUTPATIENT
Start: 2023-12-06

## 2024-01-03 ENCOUNTER — ANESTHESIA EVENT (OUTPATIENT)
Dept: PERIOP | Facility: HOSPITAL | Age: 74
End: 2024-01-03
Payer: MEDICARE

## 2024-01-03 ENCOUNTER — HOSPITAL ENCOUNTER (OUTPATIENT)
Facility: HOSPITAL | Age: 74
Setting detail: HOSPITAL OUTPATIENT SURGERY
Discharge: HOME | End: 2024-01-03
Attending: ORTHOPAEDIC SURGERY | Admitting: ORTHOPAEDIC SURGERY
Payer: MEDICARE

## 2024-01-03 ENCOUNTER — APPOINTMENT (OUTPATIENT)
Dept: GENERAL RADIOLOGY | Facility: HOSPITAL | Age: 74
End: 2024-01-03
Payer: MEDICARE

## 2024-01-03 ENCOUNTER — ANESTHESIA EVENT CONVERTED (OUTPATIENT)
Dept: ANESTHESIOLOGY | Facility: HOSPITAL | Age: 74
End: 2024-01-03
Payer: MEDICARE

## 2024-01-03 ENCOUNTER — ANESTHESIA (OUTPATIENT)
Dept: PERIOP | Facility: HOSPITAL | Age: 74
End: 2024-01-03
Payer: MEDICARE

## 2024-01-03 VITALS
TEMPERATURE: 97.2 F | RESPIRATION RATE: 16 BRPM | HEIGHT: 60 IN | WEIGHT: 135 LBS | SYSTOLIC BLOOD PRESSURE: 161 MMHG | OXYGEN SATURATION: 92 % | HEART RATE: 81 BPM | BODY MASS INDEX: 26.5 KG/M2 | DIASTOLIC BLOOD PRESSURE: 80 MMHG

## 2024-01-03 DIAGNOSIS — I50.22 CHRONIC SYSTOLIC HEART FAILURE: Primary | ICD-10-CM

## 2024-01-03 LAB
ANION GAP SERPL CALCULATED.3IONS-SCNC: 11.2 MMOL/L (ref 5–15)
BASOPHILS # BLD AUTO: 0.04 10*3/MM3 (ref 0–0.2)
BASOPHILS NFR BLD AUTO: 0.3 % (ref 0–1.5)
BUN SERPL-MCNC: 21 MG/DL (ref 8–23)
BUN/CREAT SERPL: 15.4 (ref 7–25)
CALCIUM SPEC-SCNC: 10 MG/DL (ref 8.6–10.5)
CHLORIDE SERPL-SCNC: 107 MMOL/L (ref 98–107)
CO2 SERPL-SCNC: 20.8 MMOL/L (ref 22–29)
CREAT SERPL-MCNC: 1.36 MG/DL (ref 0.57–1)
DEPRECATED RDW RBC AUTO: 53.1 FL (ref 37–54)
EGFRCR SERPLBLD CKD-EPI 2021: 41.7 ML/MIN/1.73
EOSINOPHIL # BLD AUTO: 0.34 10*3/MM3 (ref 0–0.4)
EOSINOPHIL NFR BLD AUTO: 2.8 % (ref 0.3–6.2)
ERYTHROCYTE [DISTWIDTH] IN BLOOD BY AUTOMATED COUNT: 16.1 % (ref 12.3–15.4)
GLUCOSE SERPL-MCNC: 92 MG/DL (ref 65–99)
HCT VFR BLD AUTO: 32.8 % (ref 34–46.6)
HGB BLD-MCNC: 10.7 G/DL (ref 12–15.9)
IMM GRANULOCYTES # BLD AUTO: 0.05 10*3/MM3 (ref 0–0.05)
IMM GRANULOCYTES NFR BLD AUTO: 0.4 % (ref 0–0.5)
LYMPHOCYTES # BLD AUTO: 1.44 10*3/MM3 (ref 0.7–3.1)
LYMPHOCYTES NFR BLD AUTO: 11.9 % (ref 19.6–45.3)
MCH RBC QN AUTO: 29.2 PG (ref 26.6–33)
MCHC RBC AUTO-ENTMCNC: 32.6 G/DL (ref 31.5–35.7)
MCV RBC AUTO: 89.6 FL (ref 79–97)
MONOCYTES # BLD AUTO: 0.88 10*3/MM3 (ref 0.1–0.9)
MONOCYTES NFR BLD AUTO: 7.3 % (ref 5–12)
NEUTROPHILS NFR BLD AUTO: 77.3 % (ref 42.7–76)
NEUTROPHILS NFR BLD AUTO: 9.32 10*3/MM3 (ref 1.7–7)
NRBC BLD AUTO-RTO: 0 /100 WBC (ref 0–0.2)
PLATELET # BLD AUTO: 288 10*3/MM3 (ref 140–450)
PMV BLD AUTO: 9.5 FL (ref 6–12)
POTASSIUM SERPL-SCNC: 4.6 MMOL/L (ref 3.5–5.2)
RBC # BLD AUTO: 3.66 10*6/MM3 (ref 3.77–5.28)
SODIUM SERPL-SCNC: 139 MMOL/L (ref 136–145)
WBC NRBC COR # BLD AUTO: 12.07 10*3/MM3 (ref 3.4–10.8)

## 2024-01-03 PROCEDURE — 25010000002 ROPIVACAINE PER 1 MG: Performed by: NURSE ANESTHETIST, CERTIFIED REGISTERED

## 2024-01-03 PROCEDURE — C1713 ANCHOR/SCREW BN/BN,TIS/BN: HCPCS | Performed by: ORTHOPAEDIC SURGERY

## 2024-01-03 PROCEDURE — 25010000002 MIDAZOLAM PER 1MG: Performed by: NURSE ANESTHETIST, CERTIFIED REGISTERED

## 2024-01-03 PROCEDURE — 25810000003 LACTATED RINGERS PER 1000 ML: Performed by: ORTHOPAEDIC SURGERY

## 2024-01-03 PROCEDURE — 25010000002 LIDOCAINE 1 % SOLUTION: Performed by: ORTHOPAEDIC SURGERY

## 2024-01-03 PROCEDURE — 25010000002 PROPOFOL 10 MG/ML EMULSION: Performed by: NURSE ANESTHETIST, CERTIFIED REGISTERED

## 2024-01-03 PROCEDURE — 85025 COMPLETE CBC W/AUTO DIFF WBC: CPT | Performed by: ORTHOPAEDIC SURGERY

## 2024-01-03 PROCEDURE — 76000 FLUOROSCOPY <1 HR PHYS/QHP: CPT

## 2024-01-03 PROCEDURE — 25010000002 CEFAZOLIN SODIUM-DEXTROSE 2-3 GM-%(50ML) RECONSTITUTED SOLUTION: Performed by: ORTHOPAEDIC SURGERY

## 2024-01-03 PROCEDURE — 25010000002 ONDANSETRON PER 1 MG: Performed by: NURSE ANESTHETIST, CERTIFIED REGISTERED

## 2024-01-03 PROCEDURE — 80048 BASIC METABOLIC PNL TOTAL CA: CPT | Performed by: ORTHOPAEDIC SURGERY

## 2024-01-03 PROCEDURE — 25010000002 DEXAMETHASONE SODIUM PHOSPHATE 10 MG/ML SOLUTION: Performed by: NURSE ANESTHETIST, CERTIFIED REGISTERED

## 2024-01-03 PROCEDURE — 25010000002 PROPOFOL 200 MG/20ML EMULSION: Performed by: NURSE ANESTHETIST, CERTIFIED REGISTERED

## 2024-01-03 PROCEDURE — 25010000002 FENTANYL CITRATE (PF) 50 MCG/ML SOLUTION: Performed by: NURSE ANESTHETIST, CERTIFIED REGISTERED

## 2024-01-03 PROCEDURE — 25010000002 HYDROMORPHONE PER 4 MG: Performed by: NURSE ANESTHETIST, CERTIFIED REGISTERED

## 2024-01-03 DEVICE — SCRW GEMINUS PA NL TI 3.5X12MM: Type: IMPLANTABLE DEVICE | Site: WRIST | Status: FUNCTIONAL

## 2024-01-03 DEVICE — PEG GEMINUS THRD LK TI 2.3X18MM: Type: IMPLANTABLE DEVICE | Site: WRIST | Status: FUNCTIONAL

## 2024-01-03 DEVICE — KWIRE ELBW STD/TP 1.5X127MM NS: Type: IMPLANTABLE DEVICE | Site: WRIST | Status: FUNCTIONAL

## 2024-01-03 DEVICE — PEG GEMINUS THRD LK TI 2.3X20MM: Type: IMPLANTABLE DEVICE | Site: WRIST | Status: FUNCTIONAL

## 2024-01-03 DEVICE — PEG GEMINUS THRD LK TI 2.3X16MM: Type: IMPLANTABLE DEVICE | Site: WRIST | Status: FUNCTIONAL

## 2024-01-03 DEVICE — PLT DIST/RAD GEMINUS NRW 3HL LT: Type: IMPLANTABLE DEVICE | Site: WRIST | Status: FUNCTIONAL

## 2024-01-03 DEVICE — IMPLANTABLE DEVICE: Type: IMPLANTABLE DEVICE | Site: WRIST | Status: FUNCTIONAL

## 2024-01-03 DEVICE — DEV CONTRL TISS STRATAFIX SPIRAL MNCRYL UD 3/0 PLS 60CM: Type: IMPLANTABLE DEVICE | Site: WRIST | Status: FUNCTIONAL

## 2024-01-03 DEVICE — SCRW CORT GEMINUS LK TI 3.5X12MM: Type: IMPLANTABLE DEVICE | Site: WRIST | Status: FUNCTIONAL

## 2024-01-03 RX ORDER — KETAMINE HCL IN NACL, ISO-OSM 100MG/10ML
SYRINGE (ML) INJECTION AS NEEDED
Status: DISCONTINUED | OUTPATIENT
Start: 2024-01-03 | End: 2024-01-03 | Stop reason: SURG

## 2024-01-03 RX ORDER — DEXAMETHASONE SODIUM PHOSPHATE 10 MG/ML
INJECTION, SOLUTION INTRAMUSCULAR; INTRAVENOUS AS NEEDED
Status: DISCONTINUED | OUTPATIENT
Start: 2024-01-03 | End: 2024-01-03 | Stop reason: SURG

## 2024-01-03 RX ORDER — ONDANSETRON 2 MG/ML
INJECTION INTRAMUSCULAR; INTRAVENOUS AS NEEDED
Status: DISCONTINUED | OUTPATIENT
Start: 2024-01-03 | End: 2024-01-03 | Stop reason: SURG

## 2024-01-03 RX ORDER — MAGNESIUM HYDROXIDE 1200 MG/15ML
LIQUID ORAL AS NEEDED
Status: DISCONTINUED | OUTPATIENT
Start: 2024-01-03 | End: 2024-01-03 | Stop reason: HOSPADM

## 2024-01-03 RX ORDER — SODIUM CHLORIDE 0.9 % (FLUSH) 0.9 %
10 SYRINGE (ML) INJECTION AS NEEDED
Status: DISCONTINUED | OUTPATIENT
Start: 2024-01-03 | End: 2024-01-03 | Stop reason: HOSPADM

## 2024-01-03 RX ORDER — SODIUM CHLORIDE, SODIUM LACTATE, POTASSIUM CHLORIDE, CALCIUM CHLORIDE 600; 310; 30; 20 MG/100ML; MG/100ML; MG/100ML; MG/100ML
50 INJECTION, SOLUTION INTRAVENOUS CONTINUOUS
Status: DISCONTINUED | OUTPATIENT
Start: 2024-01-03 | End: 2024-01-03 | Stop reason: HOSPADM

## 2024-01-03 RX ORDER — SODIUM CHLORIDE, SODIUM LACTATE, POTASSIUM CHLORIDE, CALCIUM CHLORIDE 600; 310; 30; 20 MG/100ML; MG/100ML; MG/100ML; MG/100ML
1000 INJECTION, SOLUTION INTRAVENOUS CONTINUOUS
Status: CANCELLED | OUTPATIENT
Start: 2024-01-03

## 2024-01-03 RX ORDER — PROPOFOL 10 MG/ML
INJECTION, EMULSION INTRAVENOUS AS NEEDED
Status: DISCONTINUED | OUTPATIENT
Start: 2024-01-03 | End: 2024-01-03 | Stop reason: SURG

## 2024-01-03 RX ORDER — CEFAZOLIN SODIUM 2 G/50ML
2000 SOLUTION INTRAVENOUS ONCE
Status: DISCONTINUED | OUTPATIENT
Start: 2024-01-03 | End: 2024-01-03 | Stop reason: SDUPTHER

## 2024-01-03 RX ORDER — HYDROCODONE BITARTRATE AND ACETAMINOPHEN 7.5; 325 MG/1; MG/1
1 TABLET ORAL ONCE AS NEEDED
Status: DISCONTINUED | OUTPATIENT
Start: 2024-01-03 | End: 2024-01-03 | Stop reason: HOSPADM

## 2024-01-03 RX ORDER — HYDROCODONE BITARTRATE AND ACETAMINOPHEN 7.5; 325 MG/1; MG/1
1 TABLET ORAL EVERY 4 HOURS PRN
Qty: 30 TABLET | Refills: 0 | Status: SHIPPED | OUTPATIENT
Start: 2024-01-03

## 2024-01-03 RX ORDER — LIDOCAINE HYDROCHLORIDE 10 MG/ML
INJECTION, SOLUTION INFILTRATION; PERINEURAL AS NEEDED
Status: DISCONTINUED | OUTPATIENT
Start: 2024-01-03 | End: 2024-01-03 | Stop reason: HOSPADM

## 2024-01-03 RX ORDER — ROPIVACAINE HYDROCHLORIDE 5 MG/ML
INJECTION, SOLUTION EPIDURAL; INFILTRATION; PERINEURAL
Status: COMPLETED | OUTPATIENT
Start: 2024-01-03 | End: 2024-01-03

## 2024-01-03 RX ORDER — HYDROMORPHONE HYDROCHLORIDE 2 MG/ML
INJECTION, SOLUTION INTRAMUSCULAR; INTRAVENOUS; SUBCUTANEOUS AS NEEDED
Status: DISCONTINUED | OUTPATIENT
Start: 2024-01-03 | End: 2024-01-03 | Stop reason: SURG

## 2024-01-03 RX ORDER — LIDOCAINE HCL/PF 100 MG/5ML
SYRINGE (ML) INJECTION AS NEEDED
Status: DISCONTINUED | OUTPATIENT
Start: 2024-01-03 | End: 2024-01-03 | Stop reason: SURG

## 2024-01-03 RX ORDER — FENTANYL CITRATE 50 UG/ML
INJECTION, SOLUTION INTRAMUSCULAR; INTRAVENOUS AS NEEDED
Status: DISCONTINUED | OUTPATIENT
Start: 2024-01-03 | End: 2024-01-03 | Stop reason: SURG

## 2024-01-03 RX ORDER — MIDAZOLAM HYDROCHLORIDE 2 MG/2ML
INJECTION, SOLUTION INTRAMUSCULAR; INTRAVENOUS AS NEEDED
Status: DISCONTINUED | OUTPATIENT
Start: 2024-01-03 | End: 2024-01-03 | Stop reason: SURG

## 2024-01-03 RX ORDER — CLINDAMYCIN PHOSPHATE 900 MG/50ML
900 INJECTION, SOLUTION INTRAVENOUS ONCE
Status: CANCELLED | OUTPATIENT
Start: 2024-01-03 | End: 2024-01-03

## 2024-01-03 RX ORDER — CEFAZOLIN SODIUM 2 G/50ML
2 SOLUTION INTRAVENOUS ONCE
Status: COMPLETED | OUTPATIENT
Start: 2024-01-03 | End: 2024-01-03

## 2024-01-03 RX ADMIN — HYDROMORPHONE HYDROCHLORIDE 0.5 MG: 2 INJECTION, SOLUTION INTRAMUSCULAR; INTRAVENOUS; SUBCUTANEOUS at 12:06

## 2024-01-03 RX ADMIN — FENTANYL CITRATE 25 MCG: 50 INJECTION, SOLUTION INTRAMUSCULAR; INTRAVENOUS at 11:34

## 2024-01-03 RX ADMIN — PROPOFOL 50 MG: 10 INJECTION, EMULSION INTRAVENOUS at 11:17

## 2024-01-03 RX ADMIN — FENTANYL CITRATE 25 MCG: 50 INJECTION, SOLUTION INTRAMUSCULAR; INTRAVENOUS at 10:58

## 2024-01-03 RX ADMIN — CEFAZOLIN SODIUM 2 G: 2 SOLUTION INTRAVENOUS at 11:14

## 2024-01-03 RX ADMIN — SODIUM CHLORIDE, POTASSIUM CHLORIDE, SODIUM LACTATE AND CALCIUM CHLORIDE 50 ML/HR: 600; 310; 30; 20 INJECTION, SOLUTION INTRAVENOUS at 10:38

## 2024-01-03 RX ADMIN — SODIUM CHLORIDE, POTASSIUM CHLORIDE, SODIUM LACTATE AND CALCIUM CHLORIDE: 600; 310; 30; 20 INJECTION, SOLUTION INTRAVENOUS at 12:04

## 2024-01-03 RX ADMIN — PROPOFOL 50 MG: 10 INJECTION, EMULSION INTRAVENOUS at 11:26

## 2024-01-03 RX ADMIN — PROPOFOL 50 MG: 10 INJECTION, EMULSION INTRAVENOUS at 11:30

## 2024-01-03 RX ADMIN — DEXAMETHASONE SODIUM PHOSPHATE 10 MG: 10 INJECTION, SOLUTION INTRAMUSCULAR; INTRAVENOUS at 11:06

## 2024-01-03 RX ADMIN — PROPOFOL 75 MCG/KG/MIN: 10 INJECTION, EMULSION INTRAVENOUS at 11:18

## 2024-01-03 RX ADMIN — ONDANSETRON 4 MG: 2 INJECTION INTRAMUSCULAR; INTRAVENOUS at 11:18

## 2024-01-03 RX ADMIN — ROPIVACAINE HYDROCHLORIDE 25 ML: 5 INJECTION, SOLUTION EPIDURAL; INFILTRATION; PERINEURAL at 11:06

## 2024-01-03 RX ADMIN — MIDAZOLAM HYDROCHLORIDE 1 MG: 1 INJECTION, SOLUTION INTRAMUSCULAR; INTRAVENOUS at 10:58

## 2024-01-03 RX ADMIN — Medication 20 MG: at 11:34

## 2024-01-03 RX ADMIN — Medication 40 MG: at 11:17

## 2024-01-03 RX ADMIN — HYDROMORPHONE HYDROCHLORIDE 0.5 MG: 2 INJECTION, SOLUTION INTRAMUSCULAR; INTRAVENOUS; SUBCUTANEOUS at 12:13

## 2024-01-03 NOTE — ANESTHESIA POSTPROCEDURE EVALUATION
Patient: Valeria Bobby    Procedure Summary       Date: 01/03/24 Room / Location: [facility] OR  /  [facility]    Anesthesia Start: 1114 Anesthesia Stop: 1214    Procedure: OPEN REDUCTION INTERNAL FIXATION DISTAL RADIUS LEFT Diagnosis:       Unspecified fracture of the lower end of left radius, initial encounter for closed fracture      (Unspecified fracture of the lower end of left radius, initial encounter for closed fracture [S52.502A])    Surgeons: Solo Cat MD Provider: Pierce Shelby CRNA    Anesthesia Type: general with block ASA Status: 3            Anesthesia Type: general with block    Vitals  Vitals Value Taken Time   /80 01/03/24 1343   Temp 97.2 °F (36.2 °C) 01/03/24 1343   Pulse 81 01/03/24 1343   Resp 16 01/03/24 1343   SpO2 92 % 01/03/24 1343           Post Anesthesia Care and Evaluation    Patient location during evaluation: PHASE II  Patient participation: complete - patient participated  Level of consciousness: awake and alert  Pain score: 2  Pain management: satisfactory to patient    Airway patency: patent  Anesthetic complications: No anesthetic complications  PONV Status: none  Cardiovascular status: acceptable and stable  Respiratory status: acceptable  Hydration status: acceptable    Comments: Vitals signs as noted in nursing documentation as per protocol.

## 2024-01-03 NOTE — OP NOTE
Orthopedics OPEN REDUCTION INTERNAL FIXATION DISTAL RADIUS LEFT  Op Note    Valeria Bobby  1/3/2024    Pre-op Diagnosis:   Left distal radial fracture comminuted intra-articular    Post-op Diagnosis:  Same  Procedure open reduction internal fixation of left distal radius fracture comminuted intra-articular    Anesthesia:  General    Staff:   Circulator: Ace Arteaga RN; Barbara Oro RN  Radiology Technologist: Ilda Cadet  Scrub Person: Sondra Farias; Lida Rojo  Vendor Representative: Adebayo Kumar ([employer])      Specimens: None      Drains: None    Indication  73-year-old female that sustained injury to her left distal radius she had a significantly displaced intra-articular comminuted distal radial fracture.  Elected to proceed with operative intervention given her significant displacement she understood this she understood the procedure risk benefits including malunion nonunion neurovascular injury infection risk with anesthesia loss of life and limb stroke heart attack and death and elects to proceed.    Procedure  Description patient was identified in holding room her left wrist was marked take the operating room administered a general anesthetic per anesthesia team administered perioperative antibiotics position prepped draped sterile fashion left upper extremity.  Attention made to her left upper extremity where it been exsanguinated with an Esmarch tourniquet elevated 250 mmHg.  Attention made to her left wrist where a longitudinal incision was made dissection carried down through subcutaneous tissue down to the fascia over the FCR was incised longitudinally the FCR and the rotator artery interval was opened up the pronator was elevated off its radial attachment subperiosteal section was then carried out there is noted to be significantly displaced comminuted intra-articular distal radial fracture was reduced under direct vision held with a 6 2 K wire adequate alignment was  noted on a volarly based plate it was affixed to the shaft with the sliding hole was appropriately adjusted.  It was then affixed distally with standard AO technique with multiple locked screws distally.  The it was then affixed proximally with 2 more locking screws adequate alignment was noted under C-arm imaging imaging the wounds were luigi irrigated and closed in 2 layers.  Sterile dressings were applied patient was emerged anesthesia and taken to PACU in stable condition      Complications:  None    Tourniquet:: 250 mmHg    Dressing: Sterile    Disposition: Accu    Solo Cat MD     Date: 1/3/2024  Time: 12:02 EST

## 2024-01-03 NOTE — ANESTHESIA PREPROCEDURE EVALUATION
Anesthesia Evaluation     Patient summary reviewed and Nursing notes reviewed   NPO Solid Status: > 8 hours  NPO Liquid Status: > 8 hours           Airway   Mallampati: II  TM distance: >3 FB  Neck ROM: full  No difficulty expected  Dental - normal exam     Pulmonary - normal exam   (+) pneumonia ,  (-) not a smoker  Cardiovascular - normal exam  Exercise tolerance: good (4-7 METS)    (+) pacemaker interrogated 3-6 months ago, dysrhythmias Tachycardia, CHF Systolic <55%    ROS comment: 9/2023 echo -   .·  Left ventricular systolic function is mildly decreased. Calculated left ventricular EF = 39% Left ventricular ejection fraction appears to be 41 - 45%.  ·  Left ventricular wall thickness is consistent with mild to moderate concentric hypertrophy.  ·  Left ventricular diastolic function is consistent with (grade II w/high LAP) pseudonormalization.  ·  Estimated right ventricular systolic pressure from tricuspid regurgitation is normal (<35 mmHg).      Neuro/Psych- negative ROS  GI/Hepatic/Renal/Endo    (+) renal disease-    Musculoskeletal     Abdominal    Substance History - negative use     OB/GYN negative ob/gyn ROS         Other   arthritis,                   Anesthesia Plan    ASA 3     general with block     (Risks and benefits discussed including risk of aspiration, recall and dental damage. All patient questions answered.    Will continue with plan of care.)  intravenous induction     Anesthetic plan, risks, benefits, and alternatives have been provided, discussed and informed consent has been obtained with: patient.  Pre-procedure education provided  Plan discussed with CRNA.    CODE STATUS:

## 2024-01-03 NOTE — ANESTHESIA PROCEDURE NOTES
Peripheral Block      Patient reassessed immediately prior to procedure    Patient location during procedure: pre-op  Start time: 1/3/2024 10:55 AM  Stop time: 1/3/2024 11:06 AM  Performed by  CRNA/CAA: Pierce Shelby CRNA  Preanesthetic Checklist  Completed: patient identified, IV checked, site marked, risks and benefits discussed, surgical consent, monitors and equipment checked, pre-op evaluation and timeout performed  Prep:  Pt Position: supine  Sterile barriers:alcohol skin prep, cap, gloves, mask and washed/disinfected hands  Prep: ChloraPrep  Patient monitoring: blood pressure monitoring, continuous pulse oximetry and EKG  Procedure    Sedation: yes    Guidance:ultrasound guided and nerve stimulator    ULTRASOUND INTERPRETATION.  Using ultrasound guidance a 21 G gauge needle was placed in close proximity to the brachial plexus nerve, at which point, under ultrasound guidance anesthetic was injected in the area of the nerve and spread of the anesthesia was seen on ultrasound in close proximity thereto.  There were no abnormalities seen on ultrasound; a digital image was taken; and the patient tolerated the procedure with no complications. Images:still images obtained, printed/placed on chart    Block Type:suprascapular  Injection Technique:single-shot  Needle Type:echogenic  Needle Gauge:21 G  Resistance on Injection: none    Medications Used: ropivacaine (NAROPIN) injection 0.5 % - Peripheral Nerve   25 mL - 1/3/2024 11:06:00 AM      Medications  Comment:Decadron 10mg adjunct added    Post Assessment  Injection Assessment: negative aspiration for heme, no paresthesia on injection and incremental injection  Patient Tolerance:comfortable throughout block  Complications:no  Additional Notes   Incremental injection with negative aspirate. No pain on injection. Normal injection resistance.  Vascular puncture avoided. Nerves and surrounding tissues identified with local spread around nerves  visualized.

## 2024-01-29 RX ORDER — SPIRONOLACTONE 25 MG/1
TABLET ORAL
Qty: 30 TABLET | Refills: 3 | Status: SHIPPED | OUTPATIENT
Start: 2024-01-29

## 2024-04-03 ENCOUNTER — TELEPHONE (OUTPATIENT)
Dept: CARDIOLOGY | Facility: CLINIC | Age: 74
End: 2024-04-03
Payer: MEDICARE

## 2024-04-03 NOTE — TELEPHONE ENCOUNTER
Name: Valeria Bobby    Relationship: Self    Best Callback Number: [phone]     Incoming call to the Hub, requesting to  Reschedule their Device Check appointment on 04/10/24.     Per Hub workflow, further review is needed before the task can be completed.    Result of Call: Please reach out to the patient to reschedule

## 2024-04-16 ENCOUNTER — OFFICE VISIT (OUTPATIENT)
Dept: CARDIOLOGY | Facility: CLINIC | Age: 74
End: 2024-04-16
Payer: MEDICARE

## 2024-04-16 VITALS
DIASTOLIC BLOOD PRESSURE: 70 MMHG | HEIGHT: 60 IN | HEART RATE: 69 BPM | OXYGEN SATURATION: 98 % | BODY MASS INDEX: 26.37 KG/M2 | SYSTOLIC BLOOD PRESSURE: 132 MMHG

## 2024-04-16 DIAGNOSIS — I42.0 DCM (DILATED CARDIOMYOPATHY): ICD-10-CM

## 2024-04-16 DIAGNOSIS — I44.7 LBBB (LEFT BUNDLE BRANCH BLOCK): ICD-10-CM

## 2024-04-16 DIAGNOSIS — I47.10 PAROXYSMAL SVT (SUPRAVENTRICULAR TACHYCARDIA): ICD-10-CM

## 2024-04-16 DIAGNOSIS — I50.22 CHRONIC SYSTOLIC HEART FAILURE: Primary | ICD-10-CM

## 2024-04-16 PROCEDURE — 99214 OFFICE O/P EST MOD 30 MIN: CPT | Performed by: PHYSICIAN ASSISTANT

## 2024-04-16 RX ORDER — FLUTICASONE PROPIONATE 50 MCG
SPRAY, SUSPENSION (ML) NASAL AS NEEDED
COMMUNITY
Start: 2024-03-26

## 2024-04-16 RX ORDER — QUETIAPINE FUMARATE 50 MG/1
50 TABLET, FILM COATED ORAL DAILY
COMMUNITY
Start: 2024-03-14

## 2024-04-16 NOTE — PROGRESS NOTES
Valeria Bobby  1950  [phone]      [facility] CARDIOLOGY       Mercedez Alvarez PA  [address]  [facility] [address]    Chief Complaint   Patient presents with    LBBB     6 month       Problem List:   Chronic systolic heart failure, nonischemic cardiomyopathy  Echocardiogram July 11, 2020 EF 20% moderately dilated LV moderately dilated left atrium mild MR  Echocardiogram October 15,020 EF 29% mildly decreased LV function mild LV dilation  Left heart catheterization July 13, 2020 nonobstructive coronary artery disease  Cardiogram October 15, 2020 EF 29%  Implantation of bivicd ([facility]) December (2020)  SVT with remote RFA by Dr. Tyson Edgar   [place] heart class II systolic heart failure  Chronic left bundle branch block  Chronic anemia  Chronic kidney disease        Allergies  Allergies   Allergen Reactions    Codeine Nausea And Vomiting       Current Medications    Current Outpatient Medications:     acyclovir (ZOVIRAX) 400 MG tablet, As Needed., Disp: , Rfl:     FLUoxetine (PROzac) 20 MG capsule, Take 1 capsule by mouth Daily., Disp: , Rfl:     fluticasone (FLONASE) 50 MCG/ACT nasal spray, As Needed., Disp: , Rfl:     HYDROcodone-acetaminophen (NORCO) 7.5-325 MG per tablet, Take 1 tablet by mouth Every 4 (Four) Hours As Needed for Moderate Pain, Disp: 30 tablet, Rfl: 0    meclizine (ANTIVERT) 25 MG tablet, Take 1 tablet by mouth As Needed., Disp: , Rfl:     metoprolol succinate XL (TOPROL-XL) 50 MG 24 hr tablet, TAKE ONE & ONE -HALF (1&1/2) TABLET ORALLY (BY MOUTH) EACH NIGHT AT BEDTIME, Disp: 45 tablet, Rfl: 5    QUEtiapine (SEROquel) 50 MG tablet, 1 tablet Daily., Disp: , Rfl:     sacubitril-valsartan (ENTRESTO) 49-51 MG tablet, Take 1 tablet by mouth 2 (Two) Times a Day., Disp: 180 tablet, Rfl: 3    spironolactone (ALDACTONE) 25 MG tablet, TAKE ONE (1) TABLET BY MOUTH ONCE DAILY, Disp: 30 tablet, Rfl: 3    History of Present Illness     Pt presents for follow up of   "Chronic Systolic HF, LBBB, and [facility] ICD check.  Since we last saw the pt, pt denies any palpitations episodes, SOB, CP, LH, and dizziness. Denies any hospitalizations, ER visits, bleeding, or TIA/CVA symptoms. Overall feels well.  She is still recovering from back surgery, she is trying to get more conditioned. Her EF has improved to near normal on Entresto, but she can only take lower dose due to itching.     Vitals:    04/16/24 0954   BP: 132/70   BP Location: Right arm   Patient Position: Sitting   Pulse: 69   SpO2: 98%   Height: 152.4 cm (60\")       Body mass index is 26.37 kg/m².  PE:  General: NAD  Neck: no JVD, no carotid bruits, no TM  Heart RRR, NL S1, S2, no rubs, murmurs  Lungs: CTA, no wheezes, rhonchi, or rales  Abd: soft, non-tender, NL BS  Ext: No musculoskeletal deformities, no edema, cyanosis, or clubbing  Psych: normal mood and affect      Manual Device Interrogation: [facility] bivicd.  DDDR at 60.  A paced 25%.  CRT therapy 9 9%.  Normal P wave and R wave.  Normal thresholds impedances.  Battery voltage 3.3 years remaining.  Less than 1% mode switch.  LV output decreased to 2.5 V lead battery life.     Procedures    1. Chronic systolic heart failure    2. DCM (dilated cardiomyopathy)    3. LBBB (left bundle branch block)    4. Paroxysmal SVT (supraventricular tachycardia)            Plan:    1) Chronic Systolic Heart Failure: Class I HF now  -s/p BiV ICD implantation with Dr. Biswas 12/11/20. Device with normal function today,   - Echo, 10/15/20, EF 29%.now Improved to 41-45% LV mildly dilated. BB, Entresto rx  and Aldactone. Consider Jardiance in future.      2) LBBB    3)Chronic Kidney Disease  - Cr 12/2020 1.02     4) SVT: Resolved.     Follow up as scheduled.   Electronically signed by YANG Pak, 04/16/24, 10:16 AM EDT.   "

## 2024-06-03 RX ORDER — SPIRONOLACTONE 25 MG/1
TABLET ORAL
Qty: 30 TABLET | Refills: 1 | Status: SHIPPED | OUTPATIENT
Start: 2024-06-03

## 2024-06-14 ENCOUNTER — TELEPHONE (OUTPATIENT)
Dept: CARDIOLOGY | Facility: CLINIC | Age: 74
End: 2024-06-14
Payer: MEDICARE

## 2024-06-14 NOTE — TELEPHONE ENCOUNTER
Needs a letter stating that she is cleared from a Cardiac Standpoint to have a FCE test at Physical Therapy. Its series of tests that help evaluate your physical abilities. Please advise.

## 2024-06-14 NOTE — TELEPHONE ENCOUNTER
REQUEST FOR CARDIAC CLEARANCE    Caller name: Valeria Bobby     Phone Number: [phone]    Surgeon's name: [facility]    Type of planned surgery: F C E TEST    Date of planned surgery: WAITING ON CLEARANCE    Type of anesthesia: NONE    Have you been experiencing chest pain or shortness of breath? NO    Is your doctor requesting for you to stop any of your medications prior to your surgery? NO    Where should we fax the clearance to? [phone] ATTN: DANIELA

## 2024-07-22 ENCOUNTER — OFFICE VISIT (OUTPATIENT)
Dept: CARDIOLOGY | Facility: CLINIC | Age: 74
End: 2024-07-22
Payer: MEDICARE

## 2024-07-22 VITALS
SYSTOLIC BLOOD PRESSURE: 142 MMHG | HEIGHT: 61 IN | BODY MASS INDEX: 29.72 KG/M2 | DIASTOLIC BLOOD PRESSURE: 82 MMHG | WEIGHT: 157.4 LBS | HEART RATE: 83 BPM | OXYGEN SATURATION: 97 %

## 2024-07-22 DIAGNOSIS — I44.7 LBBB (LEFT BUNDLE BRANCH BLOCK): ICD-10-CM

## 2024-07-22 DIAGNOSIS — I50.22 CHRONIC SYSTOLIC HEART FAILURE: Primary | ICD-10-CM

## 2024-07-22 DIAGNOSIS — I47.10 PAROXYSMAL SVT (SUPRAVENTRICULAR TACHYCARDIA): ICD-10-CM

## 2024-07-22 PROCEDURE — G2211 COMPLEX E/M VISIT ADD ON: HCPCS | Performed by: INTERNAL MEDICINE

## 2024-07-22 PROCEDURE — 99214 OFFICE O/P EST MOD 30 MIN: CPT | Performed by: INTERNAL MEDICINE

## 2024-07-22 RX ORDER — SPIRONOLACTONE 25 MG/1
25 TABLET ORAL DAILY
Qty: 90 TABLET | Refills: 3 | Status: SHIPPED | OUTPATIENT
Start: 2024-07-22

## 2024-07-22 RX ORDER — METOPROLOL SUCCINATE 100 MG/1
100 TABLET, EXTENDED RELEASE ORAL DAILY
Qty: 90 TABLET | Refills: 3 | Status: SHIPPED | OUTPATIENT
Start: 2024-07-22

## 2024-07-22 NOTE — PROGRESS NOTES
[facility]  Office visit  Valeria Bobby  1950    There is no work phone number on file.    VISIT DATE:  7/22/2024    PCP: Mercedez Alvarez, PA  [address]  [facility] [address]    CC:  Chief Complaint   Patient presents with    Chronic systolic heart failure    DCM (dilated cardiomyopathy       Previous cardiac studies and procedures:  July 2020  Echo  Left ventricular systolic function is severely decreased.  Calculated EF = 20%. Estimated EF was in agreement with the calculated EF.  The left ventricular cavity is moderately dilated.  Left atrial cavity size is moderately dilated.  Mild mitral valve regurgitation is present  Cardiac catheterization: No angiographically significant coronary disease.    August 2020 echo  Left ventricular systolic function is severely decreased.  Estimated EF appears to be in the range of 26 - 30%.    October 2020 echo  Calculated left ventricular EF = 29% Estimated left ventricular EF was in agreement with the calculated left ventricular EF. Left ventricular systolic function is moderately decreased.  The left ventricular cavity is mildly dilated.    December 2020: [facility] CRT-D    ASSESSMENT:   Diagnosis Plan   1. Chronic systolic heart failure        2. LBBB (left bundle branch block)        3. Paroxysmal SVT (supraventricular tachycardia)            PLAN:  Continue spironolactone 25 mg p.o. daily.  Previously intolerant to carvedilol and higher doses of Toprol due to hypotension/fatigue.  Continue low-dose Entresto, did not tolerate titration of this medication.  Increasing Toprol from 75 mg p.o. daily to 100 mg p.o. daily.    Subjective  Stable functional capacity.   Denies dyspnea, chest pain or palpitations. Blood pressures running less than 120/80 mmHg,.  She is very compliant with medical therapy.  Status post CRT-D.  Developed pruritus and restless legs at moderate dose Entresto.    PHYSICAL EXAMINATION:  Vitals:    07/22/24 1046   BP:  "142/82   BP Location: Right arm   Patient Position: Sitting   Pulse: 83   SpO2: 97%   Weight: 71.4 kg (157 lb 6.4 oz)   Height: 154.9 cm (61\")     General Appearance:    Alert, cooperative, no distress, appears stated age   Head:    Normocephalic, without obvious abnormality, atraumatic   Eyes:    conjunctiva/corneas clear   Nose:   Nares normal, septum midline, mucosa normal, no drainage   Throat:   Lips, teeth and gums normal   Neck:   Supple, symmetrical, trachea midline, no carotid    bruit or JVD   Lungs:     Clear to auscultation bilaterally, respirations unlabored   Chest Wall:    No tenderness or deformity    Heart:    Regular rate and rhythm, S1 and S2 normal, no murmur, rub   or gallop, normal carotid impulse bilaterally without bruit.   Abdomen:     Soft, non-tender   Extremities:   Extremities normal, atraumatic, no cyanosis or edema   Pulses:   2+ and symmetric all extremities   Skin:   Skin color, texture, turgor normal, no rashes or lesions       Diagnostic Data:  Procedures  No results found for: \"CHLPL\", \"TRIG\", \"HDL\", \"LDLDIRECT\"  Lab Results   Component Value Date    GLUCOSE 92 01/03/2024    BUN 21 01/03/2024    CREATININE 1.36 (H) 01/03/2024     01/03/2024    K 4.6 01/03/2024     01/03/2024    CO2 20.8 (L) 01/03/2024     Lab Results   Component Value Date    HGBA1C 5.40 12/11/2020     Lab Results   Component Value Date    WBC 12.07 (H) 01/03/2024    HGB 10.7 (L) 01/03/2024    HCT 32.8 (L) 01/03/2024     01/03/2024       Allergies  Allergies   Allergen Reactions    Codeine Nausea And Vomiting       Current Medications    Current Outpatient Medications:     acyclovir (ZOVIRAX) 400 MG tablet, As Needed., Disp: , Rfl:     FLUoxetine (PROzac) 20 MG capsule, Take 1 capsule by mouth Daily., Disp: , Rfl:     fluticasone (FLONASE) 50 MCG/ACT nasal spray, As Needed., Disp: , Rfl:     HYDROcodone-acetaminophen (NORCO) 7.5-325 MG per tablet, Take 1 tablet by mouth Every 4 (Four) Hours As " Needed for Moderate Pain, Disp: 30 tablet, Rfl: 0    meclizine (ANTIVERT) 25 MG tablet, Take 1 tablet by mouth As Needed., Disp: , Rfl:     metoprolol succinate XL (TOPROL-XL) 50 MG 24 hr tablet, TAKE ONE & ONE -HALF (1&1/2) TABLET ORALLY (BY MOUTH) EACH NIGHT AT BEDTIME, Disp: 45 tablet, Rfl: 5    QUEtiapine (SEROquel) 50 MG tablet, 1 tablet Daily., Disp: , Rfl:     sacubitril-valsartan (ENTRESTO) 49-51 MG tablet, Take 1 tablet by mouth 2 (Two) Times a Day., Disp: 180 tablet, Rfl: 3    spironolactone (ALDACTONE) 25 MG tablet, TAKE ONE (1) TABLET BY MOUTH ONCE DAILY, Disp: 30 tablet, Rfl: 1          ROS  Review of Systems   Cardiovascular:  Negative for chest pain, dyspnea on exertion, irregular heartbeat, leg swelling and palpitations.   Respiratory:  Negative for shortness of breath and wheezing.          SOCIAL HX  Social History     Socioeconomic History    Marital status:    Tobacco Use    Smoking status: Never     Passive exposure: Never    Smokeless tobacco: Never   Vaping Use    Vaping status: Never Used   Substance and Sexual Activity    Alcohol use: Never    Drug use: Never    Sexual activity: Defer       FAMILY HX  Family History   Problem Relation Age of Onset    Heart attack Father     No Known Problems Brother     Ovarian cancer Neg Hx     Breast cancer Neg Hx              Bboby Peralta III, MD, FACC

## 2024-09-24 NOTE — PROGRESS NOTES
Discharge Planning Assessment  [facility]     Patient Name: Valeria Bobby  MRN: [id number]  Today's Date: 7/12/2020    Admit Date: 7/10/2020    Discharge Needs Assessment     Row Name 07/12/20 1416       Living Environment    Lives With  alone    Current Living Arrangements  home/apartment/condo    Primary Care Provided by  self    Provides Primary Care For  no one    Family Caregiver if Needed  child(salo), adult;sibling(s)    Quality of Family Relationships  helpful;involved;supportive    Able to Return to Prior Arrangements  yes       Resource/Environmental Concerns    Resource/Environmental Concerns  none       Transition Planning    Patient/Family Anticipates Transition to  home    Patient/Family Anticipated Services at Transition  none    Transportation Anticipated  family or friend will provide       Discharge Needs Assessment    Readmission Within the Last 30 Days  no previous admission in last 30 days    Concerns to be Addressed  denies needs/concerns at this time;discharge planning    Equipment Currently Used at Home  none    Current Discharge Risk  lives alone        Discharge Plan     Row Name 07/12/20 1418       Plan    Plan  Home    Patient/Family in Agreement with Plan  yes    Plan Comments  Met with patient in the room to initiate discharge planning. Patient lives alone in a single-story home in [facility]. She is independent with ADLs and mobility and does not use any DME. Her goal is home at discharge, and family will provide her ride. Patient does not anticipate any discharge needs at this time. POC is LHC in the morning and a LifeVest p/t DC. CM will continue to follow.     Final Discharge Disposition Code  01 - home or self-care        Destination      Coordination has not been started for this encounter.      Durable Medical Equipment      Coordination has not been started for this encounter.      Dialysis/Infusion      Coordination has not been started for this encounter.      Home  Emergency Department Encounter  [facility] EMERGENCY MEDICINE    Patient: Sandrita Adams  MRN: [id number]  : 1986  Date of Evaluation: 2024  ED Provider: DEBBY Ramos      Chief Complaint     No chief complaint on file.    Koi       Limitations to History: None   Historian: Patient  Records reviewed: EMR inpatient and outpatient notes, Care Everywhere    Sandrita Adams is a 38 y.o. female who presents to the emergency department complaining of not feeling well.  Patient is pregnant her last menstrual period was May 18 her due date is 2025.  She is  2 para 0.  She does see our high risk OB here at [facility].  She has a history of hidradenitis and follows with infectious disease at [facility] and also pain management at [facility].  Her next appointments with those specialties is on October. she sees her high risk OB she sees every Thursday.  They do have her on clindamycin with concern of infection of the hydradenitis.  She takes Percocet at home for the pain from the hidradenitis.  She has had some vomiting yesterday and today.  She has Zofran at home.  She did take the Zofran today but did vomit her pain medication and antibiotic.  She states that she has a decreased appetite she has not had anything to eat today.  She also has a bit of a headache.  Patient denies any chest pain fever shortness of breath abdominal pain or vaginal bleeding.  She states that she does feel the baby move frequently.  She was seen in labor and delivery yesterday for fetal monitoring.  She signed into the emergency department yesterday but left without being seen    ROS:     Review of Systems  All other systems have been reviewed and are otherwise acutely negative except as in the Koi.    Past History     Past Medical History:   Diagnosis Date    Anemia 2024    Hidradenitis suppurativa 10/29/2020    Hidradenitis    Hidradenitis suppurativa     Lupus (Multi)      Medical Care      Coordination has not been started for this encounter.      Therapy      Coordination has not been started for this encounter.      Community Resources      Coordination has not been started for this encounter.          Demographic Summary     Row Name 07/12/20 1415       General Information    Admission Type  inpatient    Referral Source  admission list    Reason for Consult  discharge planning    General Information Comments  Confirmed with patient that her PCP is Lor Pulliam in [place]. She states she has medical coverage through Medicare A & B and [facility] and rx coverage through Medicare D.        Contact Information    Permission Granted to Share Info With  ;family/designee daughter, Belem Fry        Functional Status     Row Name 07/12/20 1416       Functional Status    Usual Activity Tolerance  good       Functional Status, IADL    Medications  independent    Meal Preparation  independent    Housekeeping  independent    Laundry  independent    Shopping  independent        Psychosocial    No documentation.       Abuse/Neglect    No documentation.       Legal    No documentation.       Substance Abuse    No documentation.       Patient Forms    No documentation.           Jud Pickett, RN     UTI (urinary tract infection) during pregnancy, first trimester ([facility]) 07/16/2024    Vitamin D deficiency 06/16/2024     Past Surgical History:   Procedure Laterality Date    OTHER SURGICAL HISTORY  09/19/2022    Arm surgery    OTHER SURGICAL HISTORY Left 2011    Left wrist    OTHER SURGICAL HISTORY Left 2021    Left wrist procedure       Medications/Allergies     Previous Medications    ALBUTEROL 90 MCG/ACTUATION INHALER    Inhale 2 puffs every 6 hours if needed for wheezing.    ASPIRIN 81 MG EC TABLET    Take 2 tablets (162 mg) by mouth once daily.    BOOST 0.04 GRAM- 1 KCAL/ML LIQUID    Please dispense 14 bottles for patient to have BID for 7 days.    CERTOLIZUMAB PEGOL (CIMZIA) INJECTION    Inject 2 mL (400 mg) under the skin every 14 (fourteen) days.    CHLORHEXIDINE (HIBICLENS) 4 % EXTERNAL LIQUID    Apply topically 2 times a day. Bathe in Hibiclens BID    CLINDAMYCIN (CLEOCIN) 300 MG CAPSULE    Take 1 capsule (300 mg) by mouth 2 times a day for 14 days.    CYCLOBENZAPRINE (FLEXERIL) 10 MG TABLET    Take 1 tablet (10 mg) by mouth 2 times a day as needed for muscle spasms for up to 10 doses.    DIPHENHYDRAMINE (SOMINEX) 25 MG TABLET    Take 1 tablet (25 mg) by mouth as needed at bedtime for sleep for up to 5 days.    FAMOTIDINE (PEPCID) 20 MG TABLET    Take 1 tablet (20 mg) by mouth 2 times a day.    MULTIVITAMIN WITH MINERALS TABLET    Take 1 tablet by mouth once daily.    NALOXONE (NARCAN) 4 MG/0.1 ML NASAL SPRAY    Administer 1 spray (4 mg) into affected nostril(s) if needed for opioid reversal or respiratory depression. May repeat every 2-3 minutes if needed, alternating nostrils, until medical assistance becomes available.    ONDANSETRON ODT (ZOFRAN-ODT) 4 MG DISINTEGRATING TABLET    Take 1 tablet (4 mg) by mouth every 8 hours if needed for nausea or vomiting.    OXYCODONE-ACETAMINOPHEN (PERCOCET)  MG TABLET    Take 1 tablet by mouth every 4 hours if needed for moderate pain (4 - 6) or severe pain  (7 - 10) for up to 7 days.    PRENATAL /IRON/FOLIC ACID (PRENATAL 19 ORAL)    Take by mouth.    PROGESTERONE VAGINAL SUPPOSITORY 200 MG    Insert 2 suppositories (400 mg) into the vagina.     Allergies   Allergen Reactions    Suboxone [Buprenorphine-Naloxone] Anaphylaxis, Hives and Itching    Clarithromycin Hives    Haloperidol Hallucinations and Psychosis    Levofloxacin Swelling     Ankle Joint/tendon pain    Metoclopramide Headache, Hallucinations and Psychosis    Reglan [Metoclopramide Hcl] Psychosis        Physical Exam       ED Triage Vitals   Temp Pulse Resp BP   -- -- -- --      SpO2 Temp src Heart Rate Source Patient Position   -- -- -- --      BP Location FiO2 (%)     -- --         Physical Exam    GENERAL:  The patient appears nourished and normally developed. Vital signs as documented.     HEENT:  Head normocephalic, atraumatic, EOMs intact, PERRLA, Mucous membranes dry Nares patent without copious rhinorrhea.  No lymphadenopathy.    PULMONARY:  Lungs are clear to auscultation, without any respiratory distress. Able to speak full sentences, no accessory muscle use    CARDIAC:   Normal rate. No murmurs, rubs or gallops    ABDOMEN:  Soft, non distended, non tender, BS positive x 4 quadrants, No rebound or guarding, no peritoneal signs, no CVA tenderness, no masses or organomegaly positive gravid uterus    MUSCULOSKELETAL:   No tenderness of cervical, thoracic and lumbar spine, no step off or deformity noted,  Able to ambulate, Non edematous, with no obvious deformities    SKIN:   Good color, with no significant rashes on exposed skin.  Skin around the perineum and butt cheeks have multiple scars from previous abscesses there is a small abscess less than the size a by dime noted on the left butt cheek that is currently draining.  She has tenderness along the mons pubis and several areas with no discrete abscess and no signs of cellulitis    NEURO:  No obvious neurological deficits, normal sensation and  strength bilaterally.  Able to follow commands, CN 2-12 grossly intact.      Diagnostics   Labs:  Labs Reviewed   CBC WITH AUTO DIFFERENTIAL - Abnormal       Result Value    WBC 9.4      nRBC 0.0      RBC 4.88      Hemoglobin 12.5      Hematocrit 37.1      MCV 76 (*)     MCH 25.6 (*)     MCHC 33.7      RDW 14.3      Platelets 302      Neutrophils % 71.5      Immature Granulocytes %, Automated 0.7      Lymphocytes % 20.6      Monocytes % 6.3      Eosinophils % 0.6      Basophils % 0.3      Neutrophils Absolute 6.73      Immature Granulocytes Absolute, Automated 0.07      Lymphocytes Absolute 1.94      Monocytes Absolute 0.59      Eosinophils Absolute 0.06      Basophils Absolute 0.03     BASIC METABOLIC PANEL - Abnormal    Glucose 86      Sodium 136      Potassium 5.5 (*)     Chloride 105      Bicarbonate 23      Anion Gap 14      Urea Nitrogen 7      Creatinine 0.53      eGFR >90      Calcium 9.6         Assessment   In brief, Sandrita Adams is a 38 y.o. female who presented to the emergency department not feeling well.  For her dehydration and her headache an IV will be started along with a liter of normal saline.  She will be given Zofran for the vomiting.  Lab work has been obtained to evaluate her concerns of infection for the hydradenitis.  Her white blood cell count is normal at 9.4 her hemoglobin shows no anemia at 12.5.  Her potassium has resulted at 5.5 but is grossly hemolyzed.  Her electrolytes are otherwise within normal limits.  Patient was able to drink a Gatorade and had saltine crackers without vomiting.  When she was able to keep liquids down she was given a dose of oxycodone for pain control.  She was able to keep the pain medication down as well.  After her dose of pain medication patient was requesting discharge.  She does have her antibiotics at home which she will continue to take she will continue her Percocet at home as well as her Zofran for the nausea.  She has appointments already  scheduled for high risk OB as well as pain management and infectious disease.  She will continue with all of these appointments as well.  Patient has verbalized understanding of plan of care and is agreeable      ED Course     Diagnoses as of 09/24/24 1150   Nausea and vomiting, unspecified vomiting type   Other chronic pain   Hydradenitis       Visit Vitals  LMP 05/18/2024   OB Status Pregnant   Smoking Status Every Day       Medications   ondansetron (Zofran) injection 4 mg (4 mg intravenous Given 9/24/24 0939)   sodium chloride 0.9 % bolus 1,000 mL (0 mL intravenous Stopped 9/24/24 1141)   oxyCODONE (Roxicodone) immediate release tablet 5 mg (5 mg oral Given 9/24/24 1140)       Plan of care discussed, patient verbalizes understanding of plan of care and is in agreement.      Final Impression      1. Nausea and vomiting, unspecified vomiting type    2. Other chronic pain    3. Hydradenitis          DISPOSITION  Disposition: Discharge  Patient condition is: Stable    Comment: Please note this report has been produced using speech recognition software and may contain errors related to that system including errors in grammar, punctuation, and spelling, as well as words and phrases that may be inappropriate.  If there are any questions or concerns please feel free to contact the dictating provider for clarification.    DEBBY Ramos APRN-CNP  09/24/24 7620

## 2025-03-05 ENCOUNTER — TELEPHONE (OUTPATIENT)
Dept: CARDIOLOGY | Facility: CLINIC | Age: 75
End: 2025-03-05
Payer: COMMERCIAL

## 2025-03-05 NOTE — TELEPHONE ENCOUNTER
Name: Valeria Bobby    Relationship: Self    Best Callback Number: [phone]     Incoming call to the Hub, requesting to  Reschedule their Other: [facility]  appointment on 03/12/25.     Per Hub workflow, further review is needed before the task can be completed.    Result of Call: Please reach out to the patient to reschedule

## 2025-03-31 RX ORDER — METOPROLOL SUCCINATE 100 MG/1
100 TABLET, EXTENDED RELEASE ORAL DAILY
Qty: 90 TABLET | Refills: 1 | Status: SHIPPED | OUTPATIENT
Start: 2025-03-31

## 2025-04-16 ENCOUNTER — OFFICE VISIT (OUTPATIENT)
Dept: CARDIOLOGY | Facility: CLINIC | Age: 75
End: 2025-04-16
Payer: MEDICARE

## 2025-04-16 VITALS
BODY MASS INDEX: 29.57 KG/M2 | DIASTOLIC BLOOD PRESSURE: 78 MMHG | HEART RATE: 64 BPM | WEIGHT: 156.6 LBS | HEIGHT: 61 IN | SYSTOLIC BLOOD PRESSURE: 124 MMHG

## 2025-04-16 DIAGNOSIS — I42.0 DCM (DILATED CARDIOMYOPATHY): ICD-10-CM

## 2025-04-16 DIAGNOSIS — I47.10 SUSTAINED SVT: ICD-10-CM

## 2025-04-16 DIAGNOSIS — I50.22 CHRONIC SYSTOLIC HEART FAILURE: Primary | ICD-10-CM

## 2025-04-16 DIAGNOSIS — I44.7 LBBB (LEFT BUNDLE BRANCH BLOCK): ICD-10-CM

## 2025-04-16 DIAGNOSIS — N18.9 CHRONIC KIDNEY DISEASE, UNSPECIFIED CKD STAGE: ICD-10-CM

## 2025-04-16 NOTE — PROGRESS NOTES
Valeria Bobby  1950  [phone]      04/16/2025    [facility] CARDIOLOGY     [place], [place]  [address]  [facility] [address]    Chief Complaint   Patient presents with    Chronic systolic heart failure     C/O SOB       Problem List:   Chronic systolic heart failure, nonischemic cardiomyopathy  Echocardiogram July 11, 2020 EF 20% moderately dilated LV moderately dilated left atrium mild MR  Echocardiogram October 15,020 EF 29% mildly decreased LV function mild LV dilation  Left heart catheterization July 13, 2020 nonobstructive coronary artery disease  Cardiogram October 15, 2020 EF 29%  Implantation of bivicd ([facility]) December (2020)  Echo 9/6/2023 LVEF 45%  SVT with remote RFA by Dr. Tyson Edgar   [place] heart class II systolic heart failure  Chronic left bundle branch block  Chronic anemia  Chronic kidney disease  Allergies  Allergies   Allergen Reactions    Codeine Nausea And Vomiting       Current Medications    Current Outpatient Medications:     FLUoxetine (PROzac) 20 MG capsule, Take 1 capsule by mouth Daily., Disp: , Rfl:     meclizine (ANTIVERT) 25 MG tablet, Take 1 tablet by mouth As Needed., Disp: , Rfl:     metoprolol succinate XL (Toprol XL) 100 MG 24 hr tablet, Take 1 tablet by mouth Daily., Disp: 90 tablet, Rfl: 1    QUEtiapine (SEROquel) 50 MG tablet, 1 tablet Daily., Disp: , Rfl:     spironolactone (ALDACTONE) 25 MG tablet, Take 1 tablet by mouth Daily., Disp: 90 tablet, Rfl: 3    History of Present Illness     Pt presents for follow up of CHF/SVT/LBBB. Since the pt has seen us, pt denies any palpitations, SOB, CP, LH, and dizziness. Denies any hospitalizations, ER visits, ICD shocks, or TIA/CVA symptoms. Overall feels well. No side effects to medications.  Blood pressure been well-controlled.  She was apparently unable to take losartan due to side effects.      Vitals:    04/16/25 1446   BP: 124/78   BP Location: Right arm   Patient Position: Sitting  "  Cuff Size: Adult   Pulse: 64   Weight: 71 kg (156 lb 9.6 oz)   Height: 154.9 cm (61\")       PE:  General: NAD  Neck: no JVD, no carotid bruits, no TM  Heart RRR, NL S1, S2, S4 present, no rubs, murmurs  Lungs: CTA, no wheezes, rhonchi, or rales  Abd: soft, non-tender, NL BS  Ext: No musculoskeletal deformities, no edema, cyanosis, or clubbing  Psych: normal mood and affect    Diagnostic Data:    ECG 12 Lead    Date/Time: 4/16/2025 3:50 PM  Performed by: Jeremiah Biswas MD    Authorized by: Jeremiah Biswas MD  Comparison: compared with previous ECG from 5/2/2022  Similar to previous ECG  Comparison to previous ECG: Now paced.  Rhythm: sinus rhythm and paced  BPM: 64      .    [facility] bivicd. DDDR at 60. A paced 27. CRT therapy 99%. Normal P wave and R wave. Normal thresholds impedances. Battery voltage 3.1 years remaining. No AF.     1. Chronic systolic heart failure    2. DCM (dilated cardiomyopathy)    3. LBBB (left bundle branch block)    4. Chronic kidney disease, unspecified CKD stage    5. Sustained SVT            Plan:    1) Chronic Systolic Heart Failure: Class I HF now  -s/p BiV ICD implantation with Dr. Biswas 12/11/20. Device with normal function today,   - Echo, 10/15/20, EF 29%.now Improved to 45% LV mildly dilated. BB, Entresto rx  and Aldactone. Per Dr Peralta. normal biventricular ICD function.     2) LBBB     3)Chronic Kidney Disease  - Cr 1/2024 1.36      4) SVT: Resolved.     F/up in 6 months         "

## 2025-06-09 ENCOUNTER — TELEPHONE (OUTPATIENT)
Dept: CARDIOLOGY | Facility: CLINIC | Age: 75
End: 2025-06-09
Payer: MEDICARE

## 2025-06-09 DIAGNOSIS — I50.22 CHRONIC SYSTOLIC HEART FAILURE: Primary | ICD-10-CM

## 2025-06-09 NOTE — TELEPHONE ENCOUNTER
reviewed recent lab work from her PCP. K was elevated at 6.1. Patient notified of 's orders. Discontinue Spironolactone ,BMP in one week and a low potassium diet. Patient verbalized understanding of all orders aforementioned above.

## 2025-06-13 ENCOUNTER — LAB (OUTPATIENT)
Facility: HOSPITAL | Age: 75
End: 2025-06-13
Payer: MEDICARE

## 2025-06-13 DIAGNOSIS — I50.22 CHRONIC SYSTOLIC HEART FAILURE: ICD-10-CM

## 2025-06-13 LAB
ANION GAP SERPL CALCULATED.3IONS-SCNC: 11.3 MMOL/L (ref 5–15)
BUN SERPL-MCNC: 33 MG/DL (ref 8–23)
BUN/CREAT SERPL: 26 (ref 7–25)
CALCIUM SPEC-SCNC: 10.1 MG/DL (ref 8.6–10.5)
CHLORIDE SERPL-SCNC: 105 MMOL/L (ref 98–107)
CO2 SERPL-SCNC: 21.7 MMOL/L (ref 22–29)
CREAT SERPL-MCNC: 1.27 MG/DL (ref 0.57–1)
EGFRCR SERPLBLD CKD-EPI 2021: 45 ML/MIN/1.73
GLUCOSE SERPL-MCNC: 93 MG/DL (ref 65–99)
POTASSIUM SERPL-SCNC: 5 MMOL/L (ref 3.5–5.2)
SODIUM SERPL-SCNC: 138 MMOL/L (ref 136–145)

## 2025-06-13 PROCEDURE — 80048 BASIC METABOLIC PNL TOTAL CA: CPT

## 2025-07-25 LAB
MC_CV_MDC_IDC_RATE_1: 150
MC_CV_MDC_IDC_RATE_1: 194
MC_CV_MDC_IDC_RATE_1: 222
MC_CV_MDC_IDC_SHOCK_MEASURED_IMPEDANCE: 72
MC_CV_MDC_IDC_THERAPIES: NORMAL
MC_CV_MDC_IDC_THERAPIES: NORMAL
MC_CV_MDC_IDC_ZONE_ID: 1
MC_CV_MDC_IDC_ZONE_ID: 2
MC_CV_MDC_IDC_ZONE_ID: 3
MDC_IDC_MSMT_BATTERY_REMAINING_LONGEVITY: 35 MO
MDC_IDC_MSMT_BATTERY_REMAINING_PERCENTAGE: 40 %
MDC_IDC_MSMT_BATTERY_RRT_TRIGGER: 2.59
MDC_IDC_MSMT_BATTERY_STATUS: NORMAL
MDC_IDC_MSMT_BATTERY_VOLTAGE: 2.93
MDC_IDC_MSMT_CAP_CHARGE_TIME: 8.5
MDC_IDC_MSMT_LEADCHNL_LV_IMPEDANCE_VALUE: 1225
MDC_IDC_MSMT_LEADCHNL_LV_PACING_THRESHOLD_POLARITY: NORMAL
MDC_IDC_MSMT_LEADCHNL_RA_DTM: NORMAL
MDC_IDC_MSMT_LEADCHNL_RA_IMPEDANCE_VALUE: 380
MDC_IDC_MSMT_LEADCHNL_RA_PACING_THRESHOLD_AMPLITUDE: 0.88
MDC_IDC_MSMT_LEADCHNL_RA_PACING_THRESHOLD_POLARITY: NORMAL
MDC_IDC_MSMT_LEADCHNL_RA_PACING_THRESHOLD_PULSEWIDTH: 0.5
MDC_IDC_MSMT_LEADCHNL_RA_SENSING_INTR_AMPL: 2.6
MDC_IDC_MSMT_LEADCHNL_RV_DTM: NORMAL
MDC_IDC_MSMT_LEADCHNL_RV_IMPEDANCE_VALUE: 590
MDC_IDC_MSMT_LEADCHNL_RV_PACING_THRESHOLD_AMPLITUDE: 0.62
MDC_IDC_MSMT_LEADCHNL_RV_PACING_THRESHOLD_POLARITY: NORMAL
MDC_IDC_MSMT_LEADCHNL_RV_PACING_THRESHOLD_PULSEWIDTH: 0.5
MDC_IDC_MSMT_LEADCHNL_RV_SENSING_INTR_AMPL: 11.7
MDC_IDC_PG_IMPLANT_DTM: NORMAL
MDC_IDC_PG_MFG: NORMAL
MDC_IDC_PG_MODEL: NORMAL
MDC_IDC_PG_SERIAL: NORMAL
MDC_IDC_PG_TYPE: NORMAL
MDC_IDC_SESS_DTM: NORMAL
MDC_IDC_SESS_TYPE: NORMAL
MDC_IDC_SET_BRADY_AT_MODE_SWITCH_RATE: 180
MDC_IDC_SET_BRADY_LOWRATE: 60
MDC_IDC_SET_BRADY_MAX_SENSOR_RATE: 130
MDC_IDC_SET_BRADY_MAX_TRACKING_RATE: 130
MDC_IDC_SET_BRADY_MODE: NORMAL
MDC_IDC_SET_BRADY_PAV_DELAY: 110
MDC_IDC_SET_BRADY_SAV_DELAY: 90
MDC_IDC_SET_CRT_LVRV_DELAY: 35
MDC_IDC_SET_CRT_PACED_CHAMBERS: NORMAL
MDC_IDC_SET_LEADCHNL_LV_PACING_AMPLITUDE: 2
MDC_IDC_SET_LEADCHNL_LV_PACING_POLARITY: NORMAL
MDC_IDC_SET_LEADCHNL_LV_PACING_PULSEWIDTH: 1
MDC_IDC_SET_LEADCHNL_RA_PACING_AMPLITUDE: 2
MDC_IDC_SET_LEADCHNL_RA_PACING_POLARITY: NORMAL
MDC_IDC_SET_LEADCHNL_RA_PACING_PULSEWIDTH: 0.5
MDC_IDC_SET_LEADCHNL_RA_SENSING_POLARITY: NORMAL
MDC_IDC_SET_LEADCHNL_RA_SENSING_SENSITIVITY: 0.3
MDC_IDC_SET_LEADCHNL_RV_PACING_AMPLITUDE: 2
MDC_IDC_SET_LEADCHNL_RV_PACING_POLARITY: NORMAL
MDC_IDC_SET_LEADCHNL_RV_PACING_PULSEWIDTH: 0.5
MDC_IDC_SET_LEADCHNL_RV_SENSING_POLARITY: NORMAL
MDC_IDC_SET_LEADCHNL_RV_SENSING_SENSITIVITY: 0.5
MDC_IDC_SET_ZONE_STATUS: NORMAL
MDC_IDC_SET_ZONE_TYPE: NORMAL
MDC_IDC_STAT_AT_BURDEN_PERCENT: 0
MDC_IDC_STAT_BRADY_RA_PERCENT_PACED: 16
MDC_IDC_STAT_BRADY_RV_PERCENT_PACED: 99
MDC_IDC_STAT_CRT_PERCENT_PACED: 99
MDC_IDC_STAT_TACHYTHERAPY_ATP_DELIVERED_RECENT: 0
MDC_IDC_STAT_TACHYTHERAPY_SHOCKS_ABORTED_RECENT: 0
MDC_IDC_STAT_TACHYTHERAPY_SHOCKS_DELIVERED_RECENT: 0

## 2025-07-29 ENCOUNTER — OFFICE VISIT (OUTPATIENT)
Dept: CARDIOLOGY | Facility: CLINIC | Age: 75
End: 2025-07-29
Payer: MEDICARE

## 2025-07-29 VITALS
OXYGEN SATURATION: 97 % | BODY MASS INDEX: 29.07 KG/M2 | DIASTOLIC BLOOD PRESSURE: 82 MMHG | HEART RATE: 67 BPM | WEIGHT: 154 LBS | SYSTOLIC BLOOD PRESSURE: 128 MMHG | HEIGHT: 61 IN

## 2025-07-29 DIAGNOSIS — I50.22 CHRONIC SYSTOLIC HEART FAILURE: Primary | ICD-10-CM

## 2025-07-29 DIAGNOSIS — I47.10 PAROXYSMAL SVT (SUPRAVENTRICULAR TACHYCARDIA): ICD-10-CM

## 2025-07-29 DIAGNOSIS — I44.7 LBBB (LEFT BUNDLE BRANCH BLOCK): ICD-10-CM

## 2025-07-29 PROCEDURE — G2211 COMPLEX E/M VISIT ADD ON: HCPCS | Performed by: INTERNAL MEDICINE

## 2025-07-29 PROCEDURE — 99214 OFFICE O/P EST MOD 30 MIN: CPT | Performed by: INTERNAL MEDICINE

## 2025-07-29 RX ORDER — FLUTICASONE PROPIONATE 50 MCG
SPRAY, SUSPENSION (ML) NASAL AS NEEDED
COMMUNITY
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
## 2025-08-18 ENCOUNTER — TELEPHONE (OUTPATIENT)
Dept: CARDIOLOGY | Facility: CLINIC | Age: 75
End: 2025-08-18
Payer: MEDICARE

## 2025-08-18 DIAGNOSIS — I50.22 CHRONIC SYSTOLIC HEART FAILURE: Primary | ICD-10-CM

## 2025-08-18 DIAGNOSIS — R06.02 SHORTNESS OF BREATH: ICD-10-CM

## 2025-08-19 ENCOUNTER — HOSPITAL ENCOUNTER (OUTPATIENT)
Dept: CARDIOLOGY | Facility: HOSPITAL | Age: 75
Discharge: HOME | End: 2025-08-19
Admitting: INTERNAL MEDICINE
Payer: MEDICARE

## 2025-08-19 ENCOUNTER — RESULTS FOLLOW-UP (OUTPATIENT)
Dept: CARDIOLOGY | Facility: CLINIC | Age: 75
End: 2025-08-19

## 2025-08-19 VITALS — WEIGHT: 154.1 LBS | BODY MASS INDEX: 29.09 KG/M2 | HEIGHT: 61 IN

## 2025-08-19 DIAGNOSIS — I50.22 CHRONIC SYSTOLIC HEART FAILURE: ICD-10-CM

## 2025-08-19 DIAGNOSIS — R06.02 SHORTNESS OF BREATH: ICD-10-CM

## 2025-08-19 LAB
AORTIC DIMENSIONLESS INDEX: 0.7 (DI)
ASCENDING AORTA: 2.7 CM
AV MEAN PRESS GRAD SYS DOP V1V2: 2.8 MMHG
AV VMAX SYS DOP: 112.2 CM/SEC
BH CV ECHO MEAS - 2D AUTO EF SIEMENS: 42.1 %
BH CV ECHO MEAS - AO MAX PG: 5 MMHG
BH CV ECHO MEAS - AO ROOT DIAM: 3 CM
BH CV ECHO MEAS - AO V2 VTI: 25.4 CM
BH CV ECHO MEAS - AVA(I,D): 1.79 CM2
BH CV ECHO MEAS - IVS/LVPW: 1.22 CM
BH CV ECHO MEAS - IVSD: 1.1 CM
BH CV ECHO MEAS - LA DIMENSION: 3.5 CM
BH CV ECHO MEAS - LAT PEAK E' VEL: 7.9 CM/SEC
BH CV ECHO MEAS - LV MAX PG: 2.35 MMHG
BH CV ECHO MEAS - LV MEAN PG: 1 MMHG
BH CV ECHO MEAS - LV V1 MAX: 76.7 CM/SEC
BH CV ECHO MEAS - LV V1 VTI: 17.9 CM
BH CV ECHO MEAS - LVIDD: 4.5 CM
BH CV ECHO MEAS - LVIDS: 3 CM
BH CV ECHO MEAS - LVOT AREA: 2.5 CM2
BH CV ECHO MEAS - LVOT DIAM: 1.8 CM
BH CV ECHO MEAS - LVPWD: 0.9 CM
BH CV ECHO MEAS - MR MAX PG: 119.5 MMHG
BH CV ECHO MEAS - MR MAX VEL: 546.6 CM/SEC
BH CV ECHO MEAS - MV A MAX VEL: 101.6 CM/SEC
BH CV ECHO MEAS - MV E MAX VEL: 87 CM/SEC
BH CV ECHO MEAS - MV E/A: 0.86
BH CV ECHO MEAS - MV MAX PG: 4.2 MMHG
BH CV ECHO MEAS - MV MEAN PG: 2.3 MMHG
BH CV ECHO MEAS - MV V2 VTI: 29.8 CM
BH CV ECHO MEAS - MVA(VTI): 1.53 CM2
BH CV ECHO MEAS - PA ACC TIME: 0.09 SEC
BH CV ECHO MEAS - PA V2 MAX: 62.5 CM/SEC
BH CV ECHO MEAS - SV(LVOT): 45.5 ML
BH CV ECHO MEAS - SVI(LVOT): 26.9 ML/M2
BH CV ECHO MEAS - TAPSE (>1.6): 2.12 CM
BH CV ECHO MEAS - TR MAX PG: 20.3 MMHG
BH CV ECHO MEAS - TR MAX VEL: 224.5 CM/SEC
BH CV VAS BP LEFT ARM: NORMAL MMHG
BH CV XLRA - RV BASE: 3.4 CM
BH CV XLRA - RV LENGTH: 5.1 CM
BH CV XLRA - RV MID: 2.8 CM
BH CV XLRA - TDI S': 12 CM/SEC
IVRT: 93 MS
LEFT ATRIUM VOLUME INDEX: 32 ML/M2

## 2025-08-19 PROCEDURE — 93306 TTE W/DOPPLER COMPLETE: CPT

## 2025-08-19 PROCEDURE — 93306 TTE W/DOPPLER COMPLETE: CPT | Performed by: INTERNAL MEDICINE

## (undated) DEVICE — GUIDE WIRE WITH HYDROPHILIC COATING: Brand: ACUITY WHISPER VIEW™

## (undated) DEVICE — IRRIGATOR BULB ASEPTO 60CC STRL

## (undated) DEVICE — DRSNG SURESITE123 4X4.8IN

## (undated) DEVICE — CLAVICLE STRAP: Brand: DEROYAL

## (undated) DEVICE — GLV SURG SENSICARE GREEN W/ALOE PF LF 8 STRL

## (undated) DEVICE — SUT VIC 2/0 CT1 27IN J259H

## (undated) DEVICE — SKIN PREP TRAY W/CHG: Brand: MEDLINE INDUSTRIES, INC.

## (undated) DEVICE — CAUTERY TIP POLISHER: Brand: DEVON

## (undated) DEVICE — BNDG ELAS MATRX V/CLS 4IN 5YD LF

## (undated) DEVICE — GUIDE CATHETER: Brand: ACUITY® PRO

## (undated) DEVICE — STERILE CAST PADDING KIT: Brand: CARDINAL HEALTH

## (undated) DEVICE — DRVR AO CONNECT POLY LK SCRW

## (undated) DEVICE — INTRO SHEATH ART/FEM ENGAGE .038 6F12CM

## (undated) DEVICE — Device

## (undated) DEVICE — CATH COURNARD DECCA CSL 6F120CM

## (undated) DEVICE — MEDI-VAC YANKAUER SUCTION HANDLE W/BULBOUS TIP: Brand: CARDINAL HEALTH

## (undated) DEVICE — CATH DIAG EXPO M/ PK 6FR FL4/FR4 PIG 3PK

## (undated) DEVICE — PENCL E/S HNDSWCH ROCKRBTN HOLSTR 10FT

## (undated) DEVICE — INTRO TEAR AWAY/LVD W/SD PRT 8F 13CM

## (undated) DEVICE — ADULT, W/LG. BACK PAD, RADIOTRANSPARENT ELEMENT AND LEAD WIRE: Brand: DEFIBRILLATION ELECTRODES

## (undated) DEVICE — LEX ELECTRO PHYSIOLOGY: Brand: MEDLINE INDUSTRIES, INC.

## (undated) DEVICE — SET PRIMARY GRVTY 10DP MALE LL 104IN

## (undated) DEVICE — DRVR QC UNIV T10

## (undated) DEVICE — SYS CLS SKIN PREMIERPRO EXOFINFUSION 22CM

## (undated) DEVICE — INTRO TEAR AWAY/LVD W/SD PRT 7F 13CM

## (undated) DEVICE — RADIFOCUS GLIDEWIRE: Brand: GLIDEWIRE

## (undated) DEVICE — BIT DRL SLD SD CUT 2.0X40MM

## (undated) DEVICE — PK EXTRM UPPR 20

## (undated) DEVICE — LIMB HOLDER, WRIST/ANKLE: Brand: DEROYAL

## (undated) DEVICE — GLV SURG SENSICARE W/ALOE PF LF 7.5 STRL

## (undated) DEVICE — SLV SCD CALF HEMOFORCE DVT THERP REPROC MD

## (undated) DEVICE — FLEXIBLE YANKAUER,MEDIUM TIP, NO VACUUM CONTROL: Brand: ARGYLE

## (undated) DEVICE — ST EXT IV SMARTSITE 2VLV SP M LL 5ML IV1

## (undated) DEVICE — CANN NASL CO2 DIVIDED A/

## (undated) DEVICE — DECANT BG O JET

## (undated) DEVICE — ST INF PRI SMRTSTE 20DRP 2VLV 24ML 117

## (undated) DEVICE — PK CATH CARD 10

## (undated) DEVICE — TUBING, SUCTION, 1/4" X 10', STRAIGHT: Brand: MEDLINE

## (undated) DEVICE — SYS LD DEL ACUITY BREAKAWAY W/CATH W/GW

## (undated) DEVICE — DISPOSABLE TOURNIQUET CUFF SINGLE BLADDER, SINGLE PORT AND QUICK CONNECT CONNECTOR: Brand: COLOR CUFF

## (undated) DEVICE — BIT DRL SLD SD CUT 2.5X40MM

## (undated) DEVICE — DRVR AO CONNECT SQ TP 2MM

## (undated) DEVICE — ANGIO-SEAL VIP VASCULAR CLOSURE DEVICE: Brand: ANGIO-SEAL

## (undated) DEVICE — KT DYEVERT PLS W/ SMART SYR FOR HI VISC CONTRST DISP

## (undated) DEVICE — INTRO TEAR AWAY/LVD W/SD PRT 10F 13CM

## (undated) DEVICE — BALN PRESS WEDGE 6F 110CM

## (undated) DEVICE — 3M™ STERI-STRIP™ REINFORCED ADHESIVE SKIN CLOSURES, R1547, 1/2 IN X 4 IN (12 MM X 100 MM), 6 STRIPS/ENVELOPE: Brand: 3M™ STERI-STRIP™